# Patient Record
Sex: MALE | Race: WHITE | Employment: OTHER | URBAN - METROPOLITAN AREA
[De-identification: names, ages, dates, MRNs, and addresses within clinical notes are randomized per-mention and may not be internally consistent; named-entity substitution may affect disease eponyms.]

---

## 2018-02-22 ENCOUNTER — HOSPITAL ENCOUNTER (EMERGENCY)
Age: 78
Discharge: HOME | End: 2018-02-22
Attending: EMERGENCY MEDICINE
Payer: MEDICARE

## 2018-02-22 ENCOUNTER — APPOINTMENT (OUTPATIENT)
Dept: GENERAL RADIOLOGY | Age: 78
End: 2018-02-22
Payer: MEDICARE

## 2018-02-22 VITALS
HEIGHT: 70 IN | WEIGHT: 180 LBS | RESPIRATION RATE: 18 BRPM | DIASTOLIC BLOOD PRESSURE: 75 MMHG | OXYGEN SATURATION: 98 % | TEMPERATURE: 97.6 F | BODY MASS INDEX: 25.77 KG/M2 | HEART RATE: 74 BPM | SYSTOLIC BLOOD PRESSURE: 182 MMHG

## 2018-02-22 DIAGNOSIS — S90.31XA CONTUSION OF HEEL, RIGHT, INITIAL ENCOUNTER: ICD-10-CM

## 2018-02-22 DIAGNOSIS — M72.2 PLANTAR FASCIITIS OF RIGHT FOOT: Primary | ICD-10-CM

## 2018-02-22 PROCEDURE — 73650 X-RAY EXAM OF HEEL: CPT

## 2018-02-22 PROCEDURE — 99283 EMERGENCY DEPT VISIT LOW MDM: CPT

## 2018-02-22 RX ORDER — IBUPROFEN 800 MG/1
800 TABLET ORAL EVERY 6 HOURS PRN
COMMUNITY

## 2018-02-22 ASSESSMENT — PAIN DESCRIPTION - ORIENTATION: ORIENTATION: LEFT

## 2018-02-22 ASSESSMENT — PAIN SCALES - GENERAL: PAINLEVEL_OUTOF10: 10

## 2018-02-22 ASSESSMENT — PAIN DESCRIPTION - LOCATION: LOCATION: FOOT

## 2018-02-22 ASSESSMENT — PAIN DESCRIPTION - DESCRIPTORS: DESCRIPTORS: THROBBING;ACHING

## 2018-02-22 NOTE — ED NOTES
Pt presents to er with c/o right foot and ankle pain. Pt states he missed two steps and fell on Tuesday. Pt states he has been icing his ankle and foot and keeping off of it. Pt states pain is not getting any better. Pt has swelling noted. Palpable pedal pulse. No deformity noted. Pt a&ox3. Skin warm and dry. Respirations even and non-labored.       Cierra Way RN  02/22/18 [id number]

## 2019-02-08 ENCOUNTER — APPOINTMENT (OUTPATIENT)
Dept: CT IMAGING | Age: 79
End: 2019-02-08
Payer: COMMERCIAL

## 2019-02-08 ENCOUNTER — HOSPITAL ENCOUNTER (EMERGENCY)
Age: 79
Discharge: HOME | End: 2019-02-08
Attending: FAMILY MEDICINE
Payer: COMMERCIAL

## 2019-02-08 VITALS
BODY MASS INDEX: 27.83 KG/M2 | DIASTOLIC BLOOD PRESSURE: 82 MMHG | WEIGHT: 187.9 LBS | OXYGEN SATURATION: 98 % | HEIGHT: 69 IN | TEMPERATURE: 97.6 F | SYSTOLIC BLOOD PRESSURE: 152 MMHG | RESPIRATION RATE: 16 BRPM | HEART RATE: 75 BPM

## 2019-02-08 DIAGNOSIS — S09.90XA CLOSED HEAD INJURY, INITIAL ENCOUNTER: Primary | ICD-10-CM

## 2019-02-08 LAB
ABSOLUTE EOS #: 0.3 K/UL (ref 0–0.4)
ABSOLUTE IMMATURE GRANULOCYTE: ABNORMAL K/UL (ref 0–0.3)
ABSOLUTE LYMPH #: 1.8 K/UL (ref 1–4.8)
ABSOLUTE MONO #: 0.7 K/UL (ref 0.2–0.8)
ALBUMIN SERPL-MCNC: 4 G/DL (ref 3.5–5.2)
ALBUMIN/GLOBULIN RATIO: ABNORMAL (ref 1–2.5)
ALP BLD-CCNC: 88 U/L (ref 40–129)
ALT SERPL-CCNC: 12 U/L (ref 5–41)
ANION GAP SERPL CALCULATED.3IONS-SCNC: 15 MMOL/L (ref 9–17)
AST SERPL-CCNC: 6 U/L
BASOPHILS # BLD: 1 % (ref 0–2)
BASOPHILS ABSOLUTE: 0 K/UL (ref 0–0.2)
BILIRUB SERPL-MCNC: 0.19 MG/DL (ref 0.3–1.2)
BUN BLDV-MCNC: 23 MG/DL (ref 8–23)
BUN/CREAT BLD: 24 (ref 9–20)
CALCIUM SERPL-MCNC: 9.7 MG/DL (ref 8.6–10.4)
CHLORIDE BLD-SCNC: 105 MMOL/L (ref 98–107)
CO2: 22 MMOL/L (ref 20–31)
CREAT SERPL-MCNC: 0.95 MG/DL (ref 0.7–1.2)
DIFFERENTIAL TYPE: ABNORMAL
EOSINOPHILS RELATIVE PERCENT: 5 % (ref 1–4)
GFR AFRICAN AMERICAN: >60 ML/MIN
GFR NON-AFRICAN AMERICAN: >60 ML/MIN
GFR SERPL CREATININE-BSD FRML MDRD: ABNORMAL ML/MIN/{1.73_M2}
GFR SERPL CREATININE-BSD FRML MDRD: ABNORMAL ML/MIN/{1.73_M2}
GLUCOSE BLD-MCNC: 127 MG/DL (ref 70–99)
HCT VFR BLD CALC: 37.2 % (ref 41–53)
HEMOGLOBIN: 12.4 G/DL (ref 13.5–17.5)
IMMATURE GRANULOCYTES: ABNORMAL %
LYMPHOCYTES # BLD: 33 % (ref 24–44)
MCH RBC QN AUTO: 29.9 PG (ref 26–34)
MCHC RBC AUTO-ENTMCNC: 33.5 G/DL (ref 31–37)
MCV RBC AUTO: 89.5 FL (ref 80–100)
MONOCYTES # BLD: 12 % (ref 1–7)
NRBC AUTOMATED: ABNORMAL PER 100 WBC
PDW BLD-RTO: 13.8 % (ref 11.5–14.5)
PLATELET # BLD: 287 K/UL (ref 130–400)
PLATELET ESTIMATE: ABNORMAL
PMV BLD AUTO: 7.1 FL (ref 6–12)
POTASSIUM SERPL-SCNC: 4.6 MMOL/L (ref 3.7–5.3)
RBC # BLD: 4.15 M/UL (ref 4.5–5.9)
RBC # BLD: ABNORMAL 10*6/UL
SEG NEUTROPHILS: 49 % (ref 36–66)
SEGMENTED NEUTROPHILS ABSOLUTE COUNT: 2.8 K/UL (ref 1.8–7.7)
SODIUM BLD-SCNC: 142 MMOL/L (ref 135–144)
TOTAL PROTEIN: 7 G/DL (ref 6.4–8.3)
WBC # BLD: 5.6 K/UL (ref 3.5–11)
WBC # BLD: ABNORMAL 10*3/UL

## 2019-02-08 PROCEDURE — 80053 COMPREHEN METABOLIC PANEL: CPT

## 2019-02-08 PROCEDURE — 70450 CT HEAD/BRAIN W/O DYE: CPT

## 2019-02-08 PROCEDURE — 85025 COMPLETE CBC W/AUTO DIFF WBC: CPT

## 2019-02-08 PROCEDURE — 81003 URINALYSIS AUTO W/O SCOPE: CPT

## 2019-02-08 PROCEDURE — 99284 EMERGENCY DEPT VISIT MOD MDM: CPT

## 2019-02-08 PROCEDURE — 72125 CT NECK SPINE W/O DYE: CPT

## 2019-02-08 ASSESSMENT — ENCOUNTER SYMPTOMS
EYES NEGATIVE: 1
GASTROINTESTINAL NEGATIVE: 1
ALLERGIC/IMMUNOLOGIC NEGATIVE: 1
RESPIRATORY NEGATIVE: 1

## 2019-02-08 ASSESSMENT — PAIN DESCRIPTION - DESCRIPTORS: DESCRIPTORS: HEAVINESS

## 2019-02-08 ASSESSMENT — PAIN DESCRIPTION - FREQUENCY: FREQUENCY: CONTINUOUS

## 2019-02-08 ASSESSMENT — PAIN SCALES - WONG BAKER: WONGBAKER_NUMERICALRESPONSE: 2

## 2019-02-08 ASSESSMENT — PAIN DESCRIPTION - LOCATION: LOCATION: HEAD

## 2019-02-08 ASSESSMENT — PAIN SCALES - GENERAL: PAINLEVEL_OUTOF10: 2

## 2020-05-11 ENCOUNTER — APPOINTMENT (OUTPATIENT)
Dept: CT IMAGING | Age: 80
End: 2020-05-11
Payer: COMMERCIAL

## 2020-05-11 ENCOUNTER — HOSPITAL ENCOUNTER (EMERGENCY)
Age: 80
Discharge: HOME | End: 2020-05-11
Attending: EMERGENCY MEDICINE
Payer: COMMERCIAL

## 2020-05-11 VITALS
DIASTOLIC BLOOD PRESSURE: 88 MMHG | BODY MASS INDEX: 26.66 KG/M2 | TEMPERATURE: 97.7 F | HEIGHT: 69 IN | OXYGEN SATURATION: 98 % | SYSTOLIC BLOOD PRESSURE: 178 MMHG | HEART RATE: 78 BPM | WEIGHT: 180 LBS | RESPIRATION RATE: 16 BRPM

## 2020-05-11 LAB
ABSOLUTE EOS #: 0.32 K/UL (ref 0–0.44)
ABSOLUTE IMMATURE GRANULOCYTE: 0.08 K/UL (ref 0–0.3)
ABSOLUTE LYMPH #: 1.3 K/UL (ref 1.1–3.7)
ABSOLUTE MONO #: 0.54 K/UL (ref 0.1–1.2)
ANION GAP SERPL CALCULATED.3IONS-SCNC: 15 MMOL/L (ref 9–17)
BASOPHILS # BLD: 1 % (ref 0–2)
BASOPHILS ABSOLUTE: 0.03 K/UL (ref 0–0.2)
BUN BLDV-MCNC: 25 MG/DL (ref 8–23)
BUN/CREAT BLD: 20 (ref 9–20)
CALCIUM SERPL-MCNC: 9.3 MG/DL (ref 8.6–10.4)
CHLORIDE BLD-SCNC: 106 MMOL/L (ref 98–107)
CO2: 18 MMOL/L (ref 20–31)
CREAT SERPL-MCNC: 1.22 MG/DL (ref 0.7–1.2)
DIFFERENTIAL TYPE: ABNORMAL
EOSINOPHILS RELATIVE PERCENT: 7 % (ref 1–4)
GFR AFRICAN AMERICAN: >60 ML/MIN
GFR NON-AFRICAN AMERICAN: 57 ML/MIN
GFR SERPL CREATININE-BSD FRML MDRD: ABNORMAL ML/MIN/{1.73_M2}
GFR SERPL CREATININE-BSD FRML MDRD: ABNORMAL ML/MIN/{1.73_M2}
GLUCOSE BLD-MCNC: 128 MG/DL (ref 70–99)
HCT VFR BLD CALC: 36.1 % (ref 40.7–50.3)
HEMOGLOBIN: 11.6 G/DL (ref 13–17)
IMMATURE GRANULOCYTES: 2 %
LYMPHOCYTES # BLD: 27 % (ref 24–43)
MCH RBC QN AUTO: 30.6 PG (ref 25.2–33.5)
MCHC RBC AUTO-ENTMCNC: 32.1 G/DL (ref 28.4–34.8)
MCV RBC AUTO: 95.3 FL (ref 82.6–102.9)
MONOCYTES # BLD: 11 % (ref 3–12)
NRBC AUTOMATED: 0 PER 100 WBC
PDW BLD-RTO: 13 % (ref 11.8–14.4)
PLATELET # BLD: 249 K/UL (ref 138–453)
PLATELET ESTIMATE: ABNORMAL
PMV BLD AUTO: 8.5 FL (ref 8.1–13.5)
POTASSIUM SERPL-SCNC: 4.6 MMOL/L (ref 3.7–5.3)
RBC # BLD: 3.79 M/UL (ref 4.21–5.77)
RBC # BLD: ABNORMAL 10*6/UL
SEG NEUTROPHILS: 52 % (ref 36–65)
SEGMENTED NEUTROPHILS ABSOLUTE COUNT: 2.59 K/UL (ref 1.5–8.1)
SODIUM BLD-SCNC: 139 MMOL/L (ref 135–144)
WBC # BLD: 4.9 K/UL (ref 3.5–11.3)
WBC # BLD: ABNORMAL 10*3/UL

## 2020-05-11 PROCEDURE — 99284 EMERGENCY DEPT VISIT MOD MDM: CPT

## 2020-05-11 PROCEDURE — 96372 THER/PROPH/DIAG INJ SC/IM: CPT

## 2020-05-11 PROCEDURE — 6360000002 HC RX W HCPCS: Performed by: NURSE PRACTITIONER

## 2020-05-11 PROCEDURE — 36415 COLL VENOUS BLD VENIPUNCTURE: CPT

## 2020-05-11 PROCEDURE — 80048 BASIC METABOLIC PNL TOTAL CA: CPT

## 2020-05-11 PROCEDURE — 85025 COMPLETE CBC W/AUTO DIFF WBC: CPT

## 2020-05-11 PROCEDURE — 74176 CT ABD & PELVIS W/O CONTRAST: CPT

## 2020-05-11 RX ORDER — CYCLOBENZAPRINE HCL 10 MG
10 TABLET ORAL 3 TIMES DAILY PRN
Qty: 21 TABLET | Refills: 0 | Status: SHIPPED | OUTPATIENT
Start: 2020-05-11 | End: 2020-05-21

## 2020-05-11 RX ORDER — HYDROCODONE BITARTRATE AND ACETAMINOPHEN 5; 325 MG/1; MG/1
1 TABLET ORAL EVERY 6 HOURS PRN
Qty: 12 TABLET | Refills: 0 | Status: SHIPPED | OUTPATIENT
Start: 2020-05-11 | End: 2020-05-14

## 2020-05-11 RX ORDER — HYDROMORPHONE HYDROCHLORIDE 1 MG/ML
1 INJECTION, SOLUTION INTRAMUSCULAR; INTRAVENOUS; SUBCUTANEOUS ONCE
Status: COMPLETED | OUTPATIENT
Start: 2020-05-11 | End: 2020-05-11

## 2020-05-11 RX ORDER — KETOROLAC TROMETHAMINE 30 MG/ML
30 INJECTION, SOLUTION INTRAMUSCULAR; INTRAVENOUS ONCE
Status: COMPLETED | OUTPATIENT
Start: 2020-05-11 | End: 2020-05-11

## 2020-05-11 RX ADMIN — KETOROLAC TROMETHAMINE 30 MG: 30 INJECTION, SOLUTION INTRAMUSCULAR; INTRAVENOUS at 10:58

## 2020-05-11 RX ADMIN — HYDROMORPHONE HYDROCHLORIDE 1 MG: 1 INJECTION, SOLUTION INTRAMUSCULAR; INTRAVENOUS; SUBCUTANEOUS at 11:57

## 2020-05-11 ASSESSMENT — PAIN SCALES - GENERAL
PAINLEVEL_OUTOF10: 10

## 2020-05-11 NOTE — ED NOTES
Pt presents to er with c/o elevated blood pressure with headache and back pain. Pt states he had a headache this morning and states his blood pressure was high. Pt states he took his medication for bp at 1000. Pt states he has had back pain that radiates down bilateral legs. Pt denies known injury. Pt a&ox3. Skin warm and dry. Respirations even and non-labored.       Molly Theodore RN  05/11/20 4547

## 2020-05-12 ENCOUNTER — CARE COORDINATION (OUTPATIENT)
Dept: CARE COORDINATION | Age: 80
End: 2020-05-12

## 2020-05-12 ASSESSMENT — ENCOUNTER SYMPTOMS
RHINORRHEA: 0
ABDOMINAL PAIN: 0
SHORTNESS OF BREATH: 0
SORE THROAT: 0
CONSTIPATION: 0
WHEEZING: 0
SINUS PRESSURE: 0
DIARRHEA: 0
VOMITING: 0
COUGH: 0
NAUSEA: 0
COLOR CHANGE: 0

## 2020-05-12 NOTE — ED PROVIDER NOTES
MOUTH DAILY, Disp-90 tablet, R-1      lisinopril (PRINIVIL;ZESTRIL) 10 MG tablet TAKE 1 TABLET BY MOUTH DAILY, Disp-90 tablet, R-1      pravastatin (PRAVACHOL) 80 MG tablet TAKE 1 TABLET BY MOUTH EVERY DAY, Disp-90 tablet, R-1      furosemide (LASIX) 20 MG tablet Take 1 tablet by mouth daily. , Disp-60 tablet, R-3      butalbital-acetaminophen-caffeine (FIORICET, ESGIC) -40 MG per tablet Take 1 tablet by mouth every 4 hours as needed for Pain., Disp-60 tablet, R-0      !! ibuprofen (ADVIL;MOTRIN) 400 MG tablet Take 1 tablet by mouth every 8 hours as needed. , Disp-120 tablet, R-3      docusate sodium (COLACE) 100 MG capsule Take 1 capsule by mouth daily as needed for Constipation. meclizine (ANTIVERT) 25 MG tablet Take 25 mg by mouth 4 times daily. neomycin-polymyxin-dexameth 3.5-65910-6.1 OINT Starting 8/26/2014, Until Discontinued, R-0, Historical Med      pantoprazole (PROTONIX) 40 MG tablet Take 1 tablet by mouth daily. , Disp-90 tablet, R-1      ondansetron (ZOFRAN ODT) 4 MG disintegrating tablet Take 1 tablet by mouth every 8 hours as needed for Nausea or Other (and as needed for dizziness). , Disp-20 tablet, R-0      aspirin 325 MG tablet Take 325 mg by mouth daily. !! - Potential duplicate medications found. Please discuss with provider. PAST MEDICAL HISTORY         Diagnosis Date    Anxiety     with the chemotherapy     CAD (coronary artery disease)     Dr Parker Shi, has three stents.    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    CORONARY ANGIOPLASTY WITH STENT PLACEMENT      two occasions, one stent first, and then two the Conjunctivae normal.      Pupils: Pupils are equal, round, and reactive to light. Neck:      Musculoskeletal: Normal range of motion and neck supple. Cardiovascular:      Rate and Rhythm: Normal rate and regular rhythm. Pulmonary:      Effort: Pulmonary effort is normal. No respiratory distress. Breath sounds: Normal breath sounds. No stridor. Abdominal:      General: Bowel sounds are normal.      Palpations: Abdomen is soft. Musculoskeletal: Normal range of motion. Lumbar back: He exhibits tenderness and bony tenderness. Lymphadenopathy:      Cervical: No cervical adenopathy. Skin:     General: Skin is warm and dry. Findings: No rash. Neurological:      Mental Status: He is alert and oriented to person, place, and time. RADIOLOGY:   Non-plain film images such as CT, Ultrasound and MRI are read by the radiologist. Magali Goldmann radiographic images are visualized and preliminarily interpreted by the emergency physician with the below findings:    Ct Abdomen Pelvis Wo Contrast    Result Date: 5/11/2020  EXAMINATION: CT OF THE ABDOMEN AND PELVIS WITHOUT CONTRAST 5/11/2020 10:53 am TECHNIQUE: CT of the abdomen and pelvis was performed without the administration of intravenous contrast. Multiplanar reformatted images are provided for review. Dose modulation, iterative reconstruction, and/or weight based adjustment of the mA/kV was utilized to reduce the radiation dose to as low as reasonably achievable. COMPARISON: None. HISTORY: ORDERING SYSTEM PROVIDED HISTORY: back pain, rule out AAA TECHNOLOGIST PROVIDED HISTORY: back pain, rule out AAA Reason for Exam: Lower back pain x 3 months, hypertension, headache today Acuity: Unknown Type of Exam: Unknown FINDINGS: Lower Chest: Mild ground-glass opacities in the lower lobes. No focal area of consolidation or effusion. Organs: Evaluation of the abdominal and pelvic viscera is limited in the absence of contrast.  Mild hepatic steatosis.

## 2020-05-13 ENCOUNTER — CARE COORDINATION (OUTPATIENT)
Dept: CARE COORDINATION | Age: 80
End: 2020-05-13

## 2020-05-13 NOTE — CARE COORDINATION
Attempted 2nd call to patient about COVID 19 education. Message left on Voicemail. Have been unable to contact after 2 attempts.  Will close COVID transition episode

## 2020-10-15 ENCOUNTER — APPOINTMENT (OUTPATIENT)
Dept: GENERAL RADIOLOGY | Age: 80
End: 2020-10-15
Payer: COMMERCIAL

## 2020-10-15 ENCOUNTER — HOSPITAL ENCOUNTER (EMERGENCY)
Age: 80
Discharge: HOME | End: 2020-10-15
Attending: EMERGENCY MEDICINE
Payer: COMMERCIAL

## 2020-10-15 VITALS
WEIGHT: 180 LBS | TEMPERATURE: 97.7 F | DIASTOLIC BLOOD PRESSURE: 80 MMHG | HEIGHT: 69 IN | BODY MASS INDEX: 26.66 KG/M2 | OXYGEN SATURATION: 97 % | SYSTOLIC BLOOD PRESSURE: 173 MMHG | HEART RATE: 58 BPM | RESPIRATION RATE: 18 BRPM

## 2020-10-15 LAB
ABSOLUTE EOS #: 0.41 K/UL (ref 0–0.44)
ABSOLUTE IMMATURE GRANULOCYTE: 0.02 K/UL (ref 0–0.3)
ABSOLUTE LYMPH #: 1.48 K/UL (ref 1.1–3.7)
ABSOLUTE MONO #: 0.7 K/UL (ref 0.1–1.2)
ANION GAP SERPL CALCULATED.3IONS-SCNC: 12 MMOL/L (ref 9–17)
BASOPHILS # BLD: 1 % (ref 0–2)
BASOPHILS ABSOLUTE: 0.04 K/UL (ref 0–0.2)
BUN BLDV-MCNC: 24 MG/DL (ref 8–23)
BUN/CREAT BLD: 26 (ref 9–20)
C-REACTIVE PROTEIN: 8.1 MG/L (ref 0–5)
CALCIUM SERPL-MCNC: 9.5 MG/DL (ref 8.6–10.4)
CHLORIDE BLD-SCNC: 97 MMOL/L (ref 98–107)
CO2: 25 MMOL/L (ref 20–31)
CREAT SERPL-MCNC: 0.93 MG/DL (ref 0.7–1.2)
DIFFERENTIAL TYPE: ABNORMAL
EOSINOPHILS RELATIVE PERCENT: 7 % (ref 1–4)
FERRITIN: 57 UG/L (ref 30–400)
GFR AFRICAN AMERICAN: >60 ML/MIN
GFR NON-AFRICAN AMERICAN: >60 ML/MIN
GFR SERPL CREATININE-BSD FRML MDRD: ABNORMAL ML/MIN/{1.73_M2}
GFR SERPL CREATININE-BSD FRML MDRD: ABNORMAL ML/MIN/{1.73_M2}
GLUCOSE BLD-MCNC: 117 MG/DL (ref 70–99)
HCT VFR BLD CALC: 38.6 % (ref 40.7–50.3)
HEMOGLOBIN: 12.8 G/DL (ref 13–17)
IMMATURE GRANULOCYTES: 0 %
LACTATE DEHYDROGENASE: 281 U/L (ref 135–225)
LYMPHOCYTES # BLD: 25 % (ref 24–43)
MCH RBC QN AUTO: 30 PG (ref 25.2–33.5)
MCHC RBC AUTO-ENTMCNC: 33.2 G/DL (ref 28.4–34.8)
MCV RBC AUTO: 90.4 FL (ref 82.6–102.9)
MONOCYTES # BLD: 12 % (ref 3–12)
MYOGLOBIN: 40 NG/ML (ref 28–72)
NRBC AUTOMATED: 0 PER 100 WBC
PDW BLD-RTO: 13.3 % (ref 11.8–14.4)
PLATELET # BLD: 335 K/UL (ref 138–453)
PLATELET ESTIMATE: ABNORMAL
PMV BLD AUTO: 10 FL (ref 8.1–13.5)
POTASSIUM SERPL-SCNC: 4.4 MMOL/L (ref 3.7–5.3)
PROCALCITONIN: 0.09 NG/ML
RBC # BLD: 4.27 M/UL (ref 4.21–5.77)
RBC # BLD: ABNORMAL 10*6/UL
SEG NEUTROPHILS: 55 % (ref 36–65)
SEGMENTED NEUTROPHILS ABSOLUTE COUNT: 3.37 K/UL (ref 1.5–8.1)
SODIUM BLD-SCNC: 134 MMOL/L (ref 135–144)
TROPONIN INTERP: NORMAL
TROPONIN T: NORMAL NG/ML
TROPONIN, HIGH SENSITIVITY: 17 NG/L (ref 0–22)
WBC # BLD: 6 K/UL (ref 3.5–11.3)
WBC # BLD: ABNORMAL 10*3/UL

## 2020-10-15 PROCEDURE — 6370000000 HC RX 637 (ALT 250 FOR IP): Performed by: EMERGENCY MEDICINE

## 2020-10-15 PROCEDURE — 86140 C-REACTIVE PROTEIN: CPT

## 2020-10-15 PROCEDURE — 99284 EMERGENCY DEPT VISIT MOD MDM: CPT

## 2020-10-15 PROCEDURE — 93005 ELECTROCARDIOGRAM TRACING: CPT

## 2020-10-15 PROCEDURE — 99283 EMERGENCY DEPT VISIT LOW MDM: CPT

## 2020-10-15 PROCEDURE — 83615 LACTATE (LD) (LDH) ENZYME: CPT

## 2020-10-15 PROCEDURE — 96374 THER/PROPH/DIAG INJ IV PUSH: CPT

## 2020-10-15 PROCEDURE — 83874 ASSAY OF MYOGLOBIN: CPT

## 2020-10-15 PROCEDURE — 71045 X-RAY EXAM CHEST 1 VIEW: CPT

## 2020-10-15 PROCEDURE — 82728 ASSAY OF FERRITIN: CPT

## 2020-10-15 PROCEDURE — 85025 COMPLETE CBC W/AUTO DIFF WBC: CPT

## 2020-10-15 PROCEDURE — 6360000002 HC RX W HCPCS: Performed by: EMERGENCY MEDICINE

## 2020-10-15 PROCEDURE — 80048 BASIC METABOLIC PNL TOTAL CA: CPT

## 2020-10-15 PROCEDURE — 84145 PROCALCITONIN (PCT): CPT

## 2020-10-15 PROCEDURE — 84484 ASSAY OF TROPONIN QUANT: CPT

## 2020-10-15 RX ORDER — MECLIZINE HYDROCHLORIDE 25 MG/1
25 TABLET ORAL 3 TIMES DAILY PRN
Qty: 30 TABLET | Refills: 0 | Status: SHIPPED | OUTPATIENT
Start: 2020-10-15 | End: 2020-10-25

## 2020-10-15 RX ORDER — MECLIZINE HCL 12.5 MG/1
25 TABLET ORAL ONCE
Status: COMPLETED | OUTPATIENT
Start: 2020-10-15 | End: 2020-10-15

## 2020-10-15 RX ORDER — ONDANSETRON 4 MG/1
4 TABLET, ORALLY DISINTEGRATING ORAL EVERY 8 HOURS PRN
Qty: 20 TABLET | Refills: 0 | Status: SHIPPED | OUTPATIENT
Start: 2020-10-15

## 2020-10-15 RX ORDER — MORPHINE SULFATE 2 MG/ML
2 INJECTION, SOLUTION INTRAMUSCULAR; INTRAVENOUS ONCE
Status: DISCONTINUED | OUTPATIENT
Start: 2020-10-15 | End: 2020-10-15 | Stop reason: HOSPADM

## 2020-10-15 RX ORDER — ONDANSETRON 2 MG/ML
4 INJECTION INTRAMUSCULAR; INTRAVENOUS ONCE
Status: COMPLETED | OUTPATIENT
Start: 2020-10-15 | End: 2020-10-15

## 2020-10-15 RX ADMIN — ONDANSETRON 4 MG: 2 INJECTION INTRAMUSCULAR; INTRAVENOUS at 20:00

## 2020-10-15 RX ADMIN — MECLIZINE 25 MG: 12.5 TABLET ORAL at 19:59

## 2020-10-15 ASSESSMENT — ENCOUNTER SYMPTOMS
NAUSEA: 0
DIARRHEA: 0
FACIAL SWELLING: 0
COUGH: 0
TROUBLE SWALLOWING: 0
COLOR CHANGE: 0
ABDOMINAL PAIN: 0
SHORTNESS OF BREATH: 0
VOMITING: 0
CONSTIPATION: 0

## 2020-10-15 ASSESSMENT — PAIN SCALES - GENERAL: PAINLEVEL_OUTOF10: 0

## 2020-10-15 NOTE — ED PROVIDER NOTES
[facility] ED  EMERGENCY DEPARTMENT ENCOUNTER      Pt Name: Tiffanie Floyd  MRN: [id number]  [place] 1940  Date of evaluation: 10/15/20  CHIEF COMPLAINT     No chief complaint on file. HISTORY OF PRESENT ILLNESS   Presents to the emergency room via private auto with chest pain that started 3 hours ago. He actually denies pain but has diffuse anterior chest pressure. The pressure has been intermittent for the past 3 hours and last for a few minutes before resolving on its own. He has generalized weakness and dizziness which is accompanies the chest pressure. No diaphoresis or shortness of breath. He does have a history of CAD and had stents placed over 10 years ago. He did have 5 teeth pulled yesterday. He was started on 2 new medications today including antibiotics and Norco.  Family member at the bedside states that he is concern for COVID. He has not had any known ill contacts. No cold symptoms, coughs or fevers. The history is provided by the patient and the spouse. REVIEW OF SYSTEMS     Review of Systems   Constitutional: Positive for fatigue. Negative for activity change and fever. HENT: Positive for dental problem. Negative for congestion, facial swelling and trouble swallowing. Respiratory: Negative for cough and shortness of breath. Cardiovascular: Positive for chest pain. Negative for palpitations. Gastrointestinal: Negative for abdominal pain, constipation, diarrhea, nausea and vomiting. Musculoskeletal: Negative for arthralgias and myalgias. Skin: Negative for color change and rash. Neurological: Positive for dizziness. Negative for headaches. Psychiatric/Behavioral: Negative for confusion and decreased concentration. PASTMEDICAL HISTORY     Past Medical History:   Diagnosis Date    Anxiety     with the chemotherapy     CAD (coronary artery disease)     Dr Marshall Casey, has three stents.    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    Dr Contreras [place], non hodgkin's    Type II or unspecified type diabetes mellitus without mention of complication, not stated as uncontrolled age 79     SURGICAL HISTORY       Past Surgical History:   Procedure Laterality Date    APPENDECTOMY  age 25    COLONOSCOPY      [facility]    CORONARY ANGIOPLASTY WITH STENT PLACEMENT      two occasions, one stent first, and then two the next time, Dr Erroll Snellen       Discharge Medication List as of 10/15/2020  8:24 PM      CONTINUE these medications which have NOT CHANGED    Details   glipiZIDE (GLIPIZIDE XL) 5 MG CR tablet TAKE 1 TABLET BY MOUTH DAILY, Disp-90 tablet, R-1      metFORMIN (GLUCOPHAGE) 500 MG tablet Take 1 tablet by mouth 2 times daily (with meals). , Disp-180 tablet, R-1      metoprolol (TOPROL-XL) 100 MG XL tablet TAKE 1 TABLET BY MOUTH DAILY, Disp-90 tablet, R-1      lisinopril (PRINIVIL;ZESTRIL) 10 MG tablet TAKE 1 TABLET BY MOUTH DAILY, Disp-90 tablet, R-1      pravastatin (PRAVACHOL) 80 MG tablet TAKE 1 TABLET BY MOUTH EVERY DAY, Disp-90 tablet, R-1      aspirin 325 MG tablet Take 325 mg by mouth daily. !! ibuprofen (ADVIL;MOTRIN) 800 MG tablet Take 800 mg by mouth every 6 hours as needed for PainHistorical Med      allopurinol (ZYLOPRIM) 300 MG tablet TAKE 1 TABLET BY MOUTH DAILY, Disp-90 tablet, R-1      furosemide (LASIX) 20 MG tablet Take 1 tablet by mouth daily. , Disp-60 tablet, R-3      butalbital-acetaminophen-caffeine (FIORICET, ESGIC) -40 MG per tablet Take 1 tablet by mouth every 4 hours as needed for Pain., Disp-60 tablet, R-0      !! ibuprofen (ADVIL;MOTRIN) 400 MG tablet Take 1 tablet by mouth every 8 hours as needed. , Disp-120 tablet, R-3      docusate sodium (COLACE) 100 MG capsule Take 1 capsule by mouth daily as needed for Constipation. !! meclizine (ANTIVERT) 25 MG tablet Take 25 mg by mouth 4 times daily. of motion. Right lower leg: No edema. Left lower leg: No edema. Skin:     General: Skin is warm and dry. Neurological:      General: No focal deficit present. Mental Status: He is alert and oriented to person, place, and time. Psychiatric:         Mood and Affect: Mood normal.         Thought Content: Thought content normal.         DIAGNOSTIC RESULTS     RADIOLOGY:All plain film, CT, MRI, and formal ultrasound images (except ED bedside ultrasound) are read by the radiologist, see reports below, unless otherwise noted in MDM or here. Interpretation per the Radiologist below, if available at the time of this note:    XR CHEST PORTABLE   Final Result   No evidence of acute cardiopulmonary disease             LABS:  Labs Reviewed   CBC WITH AUTO DIFFERENTIAL - Abnormal; Notable for the following components:       Result Value    Hemoglobin 12.8 (*)     Hematocrit 38.6 (*)     Eosinophils % 7 (*)     All other components within normal limits   BASIC METABOLIC PANEL - Abnormal; Notable for the following components:    Glucose 117 (*)     BUN 24 (*)     Bun/Cre Ratio 26 (*)     Sodium 134 (*)     Chloride 97 (*)     All other components within normal limits   LACTATE DEHYDROGENASE - Abnormal; Notable for the following components:     (*)     All other components within normal limits   TROP/MYOGLOBIN   FERRITIN   TROP/MYOGLOBIN   C-REACTIVE PROTEIN   PROCALCITONIN       All other labs were within normal range or not returned as of this dictation. EMERGENCY DEPARTMENT COURSE and DIFFERENTIAL DIAGNOSIS/MDM:   Vitals:    Vitals:    10/15/20 1838   BP: (!) 173/80   Pulse: 58   Resp: 18   Temp: 97.7 °F (36.5 °C)   TempSrc: Oral   SpO2: 97%   Weight: 180 lb (81.6 kg)   Height: 5' 9\" (1.753 m)       Medical Decision Making: 3year-old male with chest pressure and dizziness. He is in no distress. Available blood work is essentially negative. Procalcitonin and CRP are pending.   Chest x-ray does not have any evidence of pneumonia. He does have some changes on his EKG which was evaluated by Dr. Zach Kinney. Plan of care was for admission which the patient declined. He did sign out AMA. The patient was given the following meds in the ED:  Orders Placed This Encounter   Medications    morphine (PF) injection 2 mg    meclizine (ANTIVERT) tablet 25 mg    ondansetron (ZOFRAN) injection 4 mg    meclizine (ANTIVERT) 25 MG tablet     Sig: Take 1 tablet by mouth 3 times daily as needed for Dizziness     Dispense:  30 tablet     Refill:  0    ondansetron (ZOFRAN ODT) 4 MG disintegrating tablet     Sig: Take 1 tablet by mouth every 8 hours as needed for Nausea     Dispense:  20 tablet     Refill:  0       FINAL IMPRESSION      1. Dizziness    2. Chest pain, unspecified type          DISPOSITION/PLAN   DISPOSITION [place] 10/15/2020 08:23:36 PM      PATIENT REFERRED TO:   Kait Wolff MD  [address] [address] [facility]  [phone]    Schedule an appointment as soon as possible for a visit in 1 day        DISCHARGE MEDICATIONS:     Discharge Medication List as of 10/15/2020  8:24 PM      START taking these medications    Details   !! meclizine (ANTIVERT) 25 MG tablet Take 1 tablet by mouth 3 times daily as needed for Dizziness, Disp-30 tablet,R-0Print      !! ondansetron (ZOFRAN ODT) 4 MG disintegrating tablet Take 1 tablet by mouth every 8 hours as needed for Nausea, Disp-20 tablet,R-0Print       !! - Potential duplicate medications found. Please discuss with provider.           CRITICAL CARE TIME       Please note that portions of this note were completed with a voice recognition program.      Lesli VICK [phone], APRN - CNP  10/15/20 [id number]

## 2020-10-15 NOTE — ED PROVIDER NOTES
[facility] ED  EMERGENCY DEPARTMENT ENCOUNTER   ATTENDING ATTESTATION     Pt Name: Clem Mosqueda  MRN: [id number]  [place] 1940  Date of evaluation: 10/15/20       Clem Mosqueda is a [de-identified] y.o. male who presents with No chief complaint on file. MDM:     Patient's EKG shows a ventricular rate of 60, NH QRS QTC and was unremarkable, the patient has left axis deviation, he appears to have a new bifascicular block. Nonspecific EKG. No significant changes when compared to the previous EKG from 2014. Vitals:   Vitals:    10/15/20 1838   BP: (!) 173/80   Pulse: 58   Resp: 18   Temp: 97.7 °F (36.5 °C)   TempSrc: Oral   SpO2: 97%   Weight: 180 lb (81.6 kg)   Height: 5' 9\" (1.753 m)         I personally evaluated and examined the patient in conjunction with the Midlevel provider and agree with the assessment, treatment plan, and disposition of the patient as recorded by the midlevel. I performed a history and physical examination of the patient and discussed management with the midlevel. I reviewed the midlevels note and agree with the documented findings and plan of care. Any areas of disagreement are noted on the chart. I was personally present for the key portions of any procedures. I have documented in the chart those procedures where I was not present during the key portions. I have personally reviewed all images and agree with the midlevel's interpretation. I have reviewed the emergency nurses triage note. I agree with the chief complaint, past medical history, past surgical history, allergies, medications, social and family history as documented unless otherwise noted.     Georges Rubinstein, MD  Attending Emergency  Physician                 Yonas Watts MD  10/15/20 6849

## 2020-11-03 PROBLEM — I10 HYPERTENSION: Status: RESOLVED | Noted: 2020-11-03 | Resolved: 2020-11-03

## 2021-07-31 ENCOUNTER — APPOINTMENT (OUTPATIENT)
Dept: GENERAL RADIOLOGY | Age: 81
End: 2021-07-31
Payer: COMMERCIAL

## 2021-07-31 ENCOUNTER — HOSPITAL ENCOUNTER (EMERGENCY)
Age: 81
Discharge: HOME | End: 2021-08-01
Attending: EMERGENCY MEDICINE
Payer: COMMERCIAL

## 2021-07-31 VITALS
DIASTOLIC BLOOD PRESSURE: 74 MMHG | HEART RATE: 82 BPM | TEMPERATURE: 98 F | SYSTOLIC BLOOD PRESSURE: 171 MMHG | WEIGHT: 180 LBS | RESPIRATION RATE: 16 BRPM | HEIGHT: 70 IN | OXYGEN SATURATION: 95 % | BODY MASS INDEX: 25.77 KG/M2

## 2021-07-31 DIAGNOSIS — L03.032 CELLULITIS OF TOE OF LEFT FOOT: Primary | ICD-10-CM

## 2021-07-31 PROCEDURE — 73630 X-RAY EXAM OF FOOT: CPT

## 2021-07-31 PROCEDURE — 99283 EMERGENCY DEPT VISIT LOW MDM: CPT

## 2021-07-31 RX ORDER — AMOXICILLIN AND CLAVULANATE POTASSIUM 875; 125 MG/1; MG/1
1 TABLET, FILM COATED ORAL ONCE
Status: COMPLETED | OUTPATIENT
Start: 2021-07-31 | End: 2021-08-01

## 2021-07-31 RX ORDER — AMOXICILLIN AND CLAVULANATE POTASSIUM 875; 125 MG/1; MG/1
1 TABLET, FILM COATED ORAL 2 TIMES DAILY
Qty: 20 TABLET | Refills: 0 | Status: SHIPPED | OUTPATIENT
Start: 2021-07-31 | End: 2022-03-23

## 2021-07-31 ASSESSMENT — ENCOUNTER SYMPTOMS
CONSTIPATION: 0
SHORTNESS OF BREATH: 0
COUGH: 0
VOMITING: 0
EYE REDNESS: 0
DIARRHEA: 0
COLOR CHANGE: 0
EYE DISCHARGE: 0
ABDOMINAL PAIN: 0
FACIAL SWELLING: 0

## 2021-07-31 ASSESSMENT — PAIN DESCRIPTION - PAIN TYPE: TYPE: ACUTE PAIN

## 2021-07-31 ASSESSMENT — PAIN DESCRIPTION - ORIENTATION: ORIENTATION: LEFT

## 2021-07-31 ASSESSMENT — PAIN SCALES - GENERAL: PAINLEVEL_OUTOF10: 10

## 2021-07-31 ASSESSMENT — PAIN DESCRIPTION - DESCRIPTORS: DESCRIPTORS: THROBBING

## 2021-07-31 ASSESSMENT — PAIN DESCRIPTION - LOCATION: LOCATION: TOE (COMMENT WHICH ONE)

## 2021-08-01 PROCEDURE — 6370000000 HC RX 637 (ALT 250 FOR IP): Performed by: EMERGENCY MEDICINE

## 2021-08-01 RX ADMIN — AMOXICILLIN AND CLAVULANATE POTASSIUM 1 TABLET: 875; 125 TABLET, FILM COATED ORAL at 00:01

## 2021-08-01 NOTE — ED PROVIDER NOTES
[address] [place] [address] ED  EMERGENCY DEPARTMENT ENCOUNTER      Pt Name: Patricia Contreras  MRN: [id number]  [place] 1940  Date of evaluation: 7/31/2021  Provider: Dada Zuluaga MD    CHIEF COMPLAINT       Chief Complaint   Patient presents with    Toe Pain     left, second toe, redness, warmth, swelling started x5 hours ago, no injury         HISTORY OF PRESENT ILLNESS  (Location/Symptom, Timing/Onset, Context/Setting, Quality, Duration, Modifying Factors, Severity.)   Patricia Contreras is a 80 y.o. male who presents to the emergency department for pain to his left second toe. No trauma and it started hurting about 3 hours ago he noticed it was red. He is worried about an infection. He rated the pain as a 10 and its continuous. Nursing Notes were reviewed. ALLERGIES     Patient has no known allergies. CURRENT MEDICATIONS       Previous Medications    ALLOPURINOL (ZYLOPRIM) 300 MG TABLET    TAKE 1 TABLET BY MOUTH DAILY    ASPIRIN 325 MG TABLET    Take 325 mg by mouth daily. BUTALBITAL-ACETAMINOPHEN-CAFFEINE (FIORICET, ESGIC) -40 MG PER TABLET    Take 1 tablet by mouth every 4 hours as needed for Pain. DOCUSATE SODIUM (COLACE) 100 MG CAPSULE    Take 1 capsule by mouth daily as needed for Constipation. FUROSEMIDE (LASIX) 20 MG TABLET    Take 1 tablet by mouth daily. GLIPIZIDE (GLIPIZIDE XL) 5 MG CR TABLET    TAKE 1 TABLET BY MOUTH DAILY    IBUPROFEN (ADVIL;MOTRIN) 400 MG TABLET    Take 1 tablet by mouth every 8 hours as needed. IBUPROFEN (ADVIL;MOTRIN) 800 MG TABLET    Take 800 mg by mouth every 6 hours as needed for Pain    LISINOPRIL (PRINIVIL;ZESTRIL) 10 MG TABLET    TAKE 1 TABLET BY MOUTH DAILY    MECLIZINE (ANTIVERT) 25 MG TABLET    Take 25 mg by mouth 4 times daily. METFORMIN (GLUCOPHAGE) 500 MG TABLET    Take 1 tablet by mouth 2 times daily (with meals).     METOPROLOL (TOPROL-XL) 100 MG XL TABLET    TAKE 1 TABLET BY MOUTH DAILY    NEOMYCIN-POLYMYXIN-DEXAMETH 3.5-41608-5.1 OINT ONDANSETRON (ZOFRAN ODT) 4 MG DISINTEGRATING TABLET    Take 1 tablet by mouth every 8 hours as needed for Nausea or Other (and as needed for dizziness). ONDANSETRON (ZOFRAN ODT) 4 MG DISINTEGRATING TABLET    Take 1 tablet by mouth every 8 hours as needed for Nausea    PANTOPRAZOLE (PROTONIX) 40 MG TABLET    Take 1 tablet by mouth daily. PRAVASTATIN (PRAVACHOL) 80 MG TABLET    TAKE 1 TABLET BY MOUTH EVERY DAY       PAST MEDICAL HISTORY         Diagnosis Date    Anxiety     with the chemotherapy     CAD (coronary artery disease)     Dr Chanelle Fitzgerald, has three stents.  GERD (gastroesophageal reflux disease)     Gout     Hyperlipidemia age 79    Hypertension age 79    Lymphoma [facility] remission [facility])     Dr Lala Hunter, non hodgkin's    Type II or unspecified type diabetes mellitus without mention of complication, not stated as uncontrolled age 79       SURGICAL HISTORY           Procedure Laterality Date    APPENDECTOMY  age 25   [facility] COLONOSCOPY      [facility]    CORONARY ANGIOPLASTY WITH STENT PLACEMENT      two occasions, one stent first, and then two the next time, Dr Khushbu Paz Age of Onset    High Blood Pressure Sister     Diabetes Sister     High Blood Pressure Brother     Diabetes Brother      Family Status   Relation Name Status    Sister [facility] Brother MOSES Alive    Mother   at age 66        CVA    Father   at age 72        septisemia, post dental extraction    MGM      MGF      PGM      PGF      Vish WYATT Alive    Vish ALDANA Alive    Son [id number] [address]      reports that he has never smoked. He has never used smokeless tobacco. He reports that he does not drink alcohol and does not use drugs.     REVIEW OF SYSTEMS    (2-9 systems for level 4, 10 or more for level 5)     Review of Systems   Constitutional: Negative for chills, fatigue and fever. HENT: Negative for congestion, ear discharge and facial swelling. Eyes: Negative for discharge and redness. Respiratory: Negative for cough and shortness of breath. Cardiovascular: Negative for chest pain. Gastrointestinal: Negative for abdominal pain, constipation, diarrhea and vomiting. Genitourinary: Negative for dysuria and hematuria. Musculoskeletal: Negative for arthralgias. Skin: Negative for color change and rash. Neurological: Negative for syncope, numbness and headaches. Hematological: Negative for adenopathy. Psychiatric/Behavioral: Negative for confusion. The patient is not nervous/anxious. Except as noted above the remainder of the review of systems was reviewed and negative. PHYSICAL EXAM    (up to 7 for level 4, 8 or more for level 5)     Vitals:    07/31/21 2248   BP: (!) 171/74   Pulse: 82   Resp: 16   Temp: 98 °F (36.7 °C)   TempSrc: Oral   SpO2: 95%   Weight: 180 lb (81.6 kg)   Height: 5' 10\" (1.778 m)       Physical Exam  Vitals reviewed. Constitutional:       General: He is not in acute distress. Appearance: He is well-developed. He is not diaphoretic. HENT:      Head: Normocephalic and atraumatic. Eyes:      General: No scleral icterus. Right eye: No discharge. Left eye: No discharge. Cardiovascular:      Rate and Rhythm: Normal rate and regular rhythm. Pulmonary:      Effort: Pulmonary effort is normal. No respiratory distress. Breath sounds: Normal breath sounds. No stridor. No wheezing or rales. Abdominal:      General: There is no distension. Palpations: Abdomen is soft. Tenderness: There is no abdominal tenderness. Musculoskeletal:      Cervical back: Neck supple. Comments: Right second toe is erythematous and minimally swollen. Some mild erythema extends a few centimeters onto the dorsum of the foot. No open areas, no paronychia. Lymphadenopathy:      Cervical: No cervical adenopathy. Skin:     General: Skin is warm and dry. Findings: No erythema or rash. Neurological:      Mental Status: He is alert and oriented to person, place, and time. Psychiatric:         Behavior: Behavior normal.             DIAGNOSTIC RESULTS     EKG: All EKG's are interpreted by the Emergency Department Physician who either signs or Co-signs this chart in the absence of a cardiologist.    Not indicated    RADIOLOGY:   Non-plain film images such as CT, Ultrasound and MRI are read by the radiologist. Plain radiographic images are visualized and preliminarily interpreted by the emergency physician with the below findings:    Interpretation per the Radiologist below, if available at the time of this note:    XR FOOT LEFT (MIN 3 VIEWS)    Result Date: 7/31/2021  EXAMINATION: THREE XRAY VIEWS OF THE LEFT FOOT 7/31/2021 11:11 pm COMPARISON: None. HISTORY: ORDERING SYSTEM PROVIDED HISTORY: Pain TECHNOLOGIST PROVIDED HISTORY: Pain Reason for Exam: left 4th toe   red  painful Mechanism of Injury: none FINDINGS: There is no evidence of acute fracture. There is normal alignment of the tarsometatarsal joints. No acute joint abnormality. No focal osseous lesion. No focal soft tissue abnormality. No acute osseous abnormality. LABS:  Labs Reviewed - No data to display    All other labs were within normal range or not returned as of this dictation.     EMERGENCY DEPARTMENT COURSE and DIFFERENTIAL DIAGNOSIS/MDM:   Vitals:    Vitals:    07/31/21 2248   BP: (!) 171/74   Pulse: 82   Resp: 16   Temp: 98 °F (36.7 °C)   TempSrc: Oral   SpO2: 95%   Weight: 180 lb (81.6 kg)   Height: 5' 10\" (1.778 m)       Orders Placed This Encounter   Medications    amoxicillin-clavulanate (AUGMENTIN) 875-125 MG per tablet 1 tablet     Order Specific Question:   Antimicrobial Indications     Answer:   Skin and Soft Tissue Infection    amoxicillin-clavulanate (AUGMENTIN) 875-125 MG per tablet     Sig: Take 1 tablet by mouth 2 times daily Dispense:  20 tablet     Refill:  0       Medical Decision Making: X-rays negative. My clinical impression is that he has cellulitis. He was given his first dose of Augmentin here and prescribed Augmentin. Treatment diagnosis and follow-up were discussed with the patient. CONSULTS:  None    PROCEDURES:  None    FINAL IMPRESSION      1.  Cellulitis of toe of left foot          DISPOSITION/PLAN   DISPOSITION Decision To Discharge 07/31/2021 11:57:44 PM      PATIENT REFERRED TO:   Nate Voss MD  [address] [address] [facility]  [phone]      As needed    [facility] ED  [address] [facility]  [phone]    If symptoms worsen      DISCHARGE MEDICATIONS:     New Prescriptions    AMOXICILLIN-CLAVULANATE (AUGMENTIN) 875-125 MG PER TABLET    Take 1 tablet by mouth 2 times daily         (Please note that portions of this note were completed with a voice recognition program.  Efforts were made to edit the dictations but occasionally words are mis-transcribed.)    Serenity Branch MD  Attending Emergency Physician           Serenity Branch MD  07/31/21 0996

## 2022-01-20 ENCOUNTER — APPOINTMENT (OUTPATIENT)
Dept: CT IMAGING | Age: 82
End: 2022-01-20
Payer: MEDICARE

## 2022-01-20 ENCOUNTER — APPOINTMENT (OUTPATIENT)
Dept: GENERAL RADIOLOGY | Age: 82
End: 2022-01-20
Payer: MEDICARE

## 2022-01-20 ENCOUNTER — HOSPITAL ENCOUNTER (EMERGENCY)
Age: 82
Discharge: HOME | End: 2022-01-20
Attending: EMERGENCY MEDICINE
Payer: MEDICARE

## 2022-01-20 VITALS
HEART RATE: 87 BPM | SYSTOLIC BLOOD PRESSURE: 179 MMHG | WEIGHT: 190 LBS | DIASTOLIC BLOOD PRESSURE: 93 MMHG | RESPIRATION RATE: 16 BRPM | BODY MASS INDEX: 27.26 KG/M2 | TEMPERATURE: 97.9 F | OXYGEN SATURATION: 95 %

## 2022-01-20 DIAGNOSIS — J02.9 VIRAL PHARYNGITIS: Primary | ICD-10-CM

## 2022-01-20 DIAGNOSIS — Z20.822 SUSPECTED COVID-19 VIRUS INFECTION: ICD-10-CM

## 2022-01-20 LAB
ABSOLUTE EOS #: 0.6 K/UL (ref 0–0.44)
ABSOLUTE IMMATURE GRANULOCYTE: 0.04 K/UL (ref 0–0.3)
ABSOLUTE LYMPH #: 1.68 K/UL (ref 1.1–3.7)
ABSOLUTE MONO #: 0.68 K/UL (ref 0.1–1.2)
ANION GAP SERPL CALCULATED.3IONS-SCNC: 14 MMOL/L (ref 9–17)
BASOPHILS # BLD: 1 % (ref 0–2)
BASOPHILS ABSOLUTE: 0.06 K/UL (ref 0–0.2)
BUN BLDV-MCNC: 21 MG/DL (ref 8–23)
BUN/CREAT BLD: 20 (ref 9–20)
CALCIUM SERPL-MCNC: 9.3 MG/DL (ref 8.6–10.4)
CHLORIDE BLD-SCNC: 104 MMOL/L (ref 98–107)
CO2: 19 MMOL/L (ref 20–31)
CREAT SERPL-MCNC: 1.07 MG/DL (ref 0.7–1.2)
DIFFERENTIAL TYPE: ABNORMAL
DIRECT EXAM: NORMAL
EOSINOPHILS RELATIVE PERCENT: 9 % (ref 1–4)
GFR AFRICAN AMERICAN: >60 ML/MIN
GFR NON-AFRICAN AMERICAN: >60 ML/MIN
GFR SERPL CREATININE-BSD FRML MDRD: ABNORMAL ML/MIN/{1.73_M2}
GFR SERPL CREATININE-BSD FRML MDRD: ABNORMAL ML/MIN/{1.73_M2}
GLUCOSE BLD-MCNC: 240 MG/DL (ref 70–99)
HCT VFR BLD CALC: 37.1 % (ref 40.7–50.3)
HEMOGLOBIN: 12.1 G/DL (ref 13–17)
IMMATURE GRANULOCYTES: 1 %
LYMPHOCYTES # BLD: 26 % (ref 24–43)
Lab: NORMAL
MCH RBC QN AUTO: 31.3 PG (ref 25.2–33.5)
MCHC RBC AUTO-ENTMCNC: 32.6 G/DL (ref 28.4–34.8)
MCV RBC AUTO: 95.9 FL (ref 82.6–102.9)
MONOCYTES # BLD: 11 % (ref 3–12)
NRBC AUTOMATED: 0 PER 100 WBC
PDW BLD-RTO: 14.4 % (ref 11.8–14.4)
PLATELET # BLD: 279 K/UL (ref 138–453)
PLATELET ESTIMATE: ABNORMAL
PMV BLD AUTO: 9.2 FL (ref 8.1–13.5)
POTASSIUM SERPL-SCNC: 4.2 MMOL/L (ref 3.7–5.3)
RBC # BLD: 3.87 M/UL (ref 4.21–5.77)
RBC # BLD: ABNORMAL 10*6/UL
SARS-COV-2: ABNORMAL
SARS-COV-2: DETECTED
SEG NEUTROPHILS: 52 % (ref 36–65)
SEGMENTED NEUTROPHILS ABSOLUTE COUNT: 3.44 K/UL (ref 1.5–8.1)
SODIUM BLD-SCNC: 137 MMOL/L (ref 135–144)
SOURCE: ABNORMAL
SPECIMEN DESCRIPTION: NORMAL
WBC # BLD: 6.5 K/UL (ref 3.5–11.3)
WBC # BLD: ABNORMAL 10*3/UL

## 2022-01-20 PROCEDURE — 87880 STREP A ASSAY W/OPTIC: CPT

## 2022-01-20 PROCEDURE — 70491 CT SOFT TISSUE NECK W/DYE: CPT

## 2022-01-20 PROCEDURE — U0005 INFEC AGEN DETEC AMPLI PROBE: HCPCS

## 2022-01-20 PROCEDURE — 71045 X-RAY EXAM CHEST 1 VIEW: CPT

## 2022-01-20 PROCEDURE — 96374 THER/PROPH/DIAG INJ IV PUSH: CPT

## 2022-01-20 PROCEDURE — 2580000003 HC RX 258: Performed by: EMERGENCY MEDICINE

## 2022-01-20 PROCEDURE — 80048 BASIC METABOLIC PNL TOTAL CA: CPT

## 2022-01-20 PROCEDURE — 99283 EMERGENCY DEPT VISIT LOW MDM: CPT

## 2022-01-20 PROCEDURE — U0003 INFECTIOUS AGENT DETECTION BY NUCLEIC ACID (DNA OR RNA); SEVERE ACUTE RESPIRATORY SYNDROME CORONAVIRUS 2 (SARS-COV-2) (CORONAVIRUS DISEASE [COVID-19]), AMPLIFIED PROBE TECHNIQUE, MAKING USE OF HIGH THROUGHPUT TECHNOLOGIES AS DESCRIBED BY CMS-2020-01-R: HCPCS

## 2022-01-20 PROCEDURE — 85025 COMPLETE CBC W/AUTO DIFF WBC: CPT

## 2022-01-20 PROCEDURE — 6360000002 HC RX W HCPCS: Performed by: EMERGENCY MEDICINE

## 2022-01-20 PROCEDURE — 6360000004 HC RX CONTRAST MEDICATION: Performed by: EMERGENCY MEDICINE

## 2022-01-20 RX ORDER — SODIUM CHLORIDE 0.9 % (FLUSH) 0.9 %
10 SYRINGE (ML) INJECTION PRN
Status: DISCONTINUED | OUTPATIENT
Start: 2022-01-20 | End: 2022-01-20 | Stop reason: HOSPADM

## 2022-01-20 RX ORDER — DEXAMETHASONE SODIUM PHOSPHATE 10 MG/ML
10 INJECTION, SOLUTION INTRAMUSCULAR; INTRAVENOUS ONCE
Status: COMPLETED | OUTPATIENT
Start: 2022-01-20 | End: 2022-01-20

## 2022-01-20 RX ORDER — 0.9 % SODIUM CHLORIDE 0.9 %
80 INTRAVENOUS SOLUTION INTRAVENOUS ONCE
Status: DISCONTINUED | OUTPATIENT
Start: 2022-01-20 | End: 2022-01-20 | Stop reason: HOSPADM

## 2022-01-20 RX ADMIN — SODIUM CHLORIDE, PRESERVATIVE FREE 10 ML: 5 INJECTION INTRAVENOUS at 09:11

## 2022-01-20 RX ADMIN — IOPAMIDOL 75 ML: 755 INJECTION, SOLUTION INTRAVENOUS at 09:11

## 2022-01-20 RX ADMIN — DEXAMETHASONE SODIUM PHOSPHATE 10 MG: 10 INJECTION, SOLUTION INTRAMUSCULAR; INTRAVENOUS at 08:12

## 2022-01-20 RX ADMIN — Medication 80 ML: at 09:11

## 2022-01-20 ASSESSMENT — ENCOUNTER SYMPTOMS
BACK PAIN: 0
COUGH: 1
SORE THROAT: 1
EYE DISCHARGE: 0
ABDOMINAL DISTENTION: 0
FACIAL SWELLING: 0
SHORTNESS OF BREATH: 0
ABDOMINAL PAIN: 0
CHEST TIGHTNESS: 0
EYE PAIN: 0

## 2022-01-20 ASSESSMENT — PAIN DESCRIPTION - LOCATION: LOCATION: THROAT

## 2022-01-20 NOTE — ED PROVIDER NOTES
EMERGENCY DEPARTMENT ENCOUNTER    Pt Name: Samantha Jenkins  MRN: [id number]  [place] 1940  Date of evaluation: 1/20/22  CHIEF COMPLAINT       Chief Complaint   Patient presents with    Cough    Pharyngitis     HISTORY OF PRESENT ILLNESS   HPI   Patient is a 70-year-old male who presented to the emergency department secondary to sore throat. Patient complains of a 5-day history of sore throat. Initially thought it was a dental infection was initiated on antibiotics no extraction however is continued to have difficulty swallowing. He denies changes in voice or drooling and still able to eat. Patient also complains of a productive cough. Patient is fully vaccinated against COVID no COVID-like symptoms however his wife is requesting him to be tested for COVID. Patient denied shortness of breath, chest pain, nausea, vomiting, fevers or chills. REVIEW OF SYSTEMS     Review of Systems   Constitutional: Negative for chills, diaphoresis and fever. HENT: Positive for sore throat. Negative for congestion, ear pain and facial swelling. Eyes: Negative for pain, discharge and visual disturbance. Respiratory: Positive for cough. Negative for chest tightness and shortness of breath. Cardiovascular: Negative for chest pain and palpitations. Gastrointestinal: Negative for abdominal distention and abdominal pain. Genitourinary: Negative for difficulty urinating and flank pain. Musculoskeletal: Negative for back pain. Skin: Negative for wound. Neurological: Negative for dizziness, light-headedness and headaches. PASTMEDICAL HISTORY     Past Medical History:   Diagnosis Date    Anxiety     with the chemotherapy     CAD (coronary artery disease)     Dr Anastasiia Honeycutt, has three stents.     GERD (gastroesophageal reflux disease)     Gout     Hyperlipidemia age 79    Hypertension age 79    [facility]oma [facility]) 2006    Dr Maximino Farrell, non hodgkin's    Type II or unspecified type diabetes mellitus without mention of complication, not stated as uncontrolled age 79     SURGICAL HISTORY       Past Surgical History:   Procedure Laterality Date    APPENDECTOMY  age 25   [facility] Carbon BACK SURGERY      COLONOSCOPY      [facility]    CORONARY ANGIOPLASTY WITH STENT PLACEMENT      two occasions, one stent first, and then two the next time, Dr Ann Garcia       Previous Medications    ALLOPURINOL (ZYLOPRIM) 300 MG TABLET    TAKE 1 TABLET BY MOUTH DAILY    AMOXICILLIN-CLAVULANATE (AUGMENTIN) 875-125 MG PER TABLET    Take 1 tablet by mouth 2 times daily    ASPIRIN 325 MG TABLET    Take 325 mg by mouth daily. BUTALBITAL-ACETAMINOPHEN-CAFFEINE (FIORICET, ESGIC) -40 MG PER TABLET    Take 1 tablet by mouth every 4 hours as needed for Pain. DOCUSATE SODIUM (COLACE) 100 MG CAPSULE    Take 1 capsule by mouth daily as needed for Constipation. FUROSEMIDE (LASIX) 20 MG TABLET    Take 1 tablet by mouth daily. GLIPIZIDE (GLIPIZIDE XL) 5 MG CR TABLET    TAKE 1 TABLET BY MOUTH DAILY    IBUPROFEN (ADVIL;MOTRIN) 400 MG TABLET    Take 1 tablet by mouth every 8 hours as needed. IBUPROFEN (ADVIL;MOTRIN) 800 MG TABLET    Take 800 mg by mouth every 6 hours as needed for Pain    LISINOPRIL (PRINIVIL;ZESTRIL) 10 MG TABLET    TAKE 1 TABLET BY MOUTH DAILY    MECLIZINE (ANTIVERT) 25 MG TABLET    Take 25 mg by mouth 4 times daily. METFORMIN (GLUCOPHAGE) 500 MG TABLET    Take 1 tablet by mouth 2 times daily (with meals). METOPROLOL (TOPROL-XL) 100 MG XL TABLET    TAKE 1 TABLET BY MOUTH DAILY    NEOMYCIN-POLYMYXIN-DEXAMETH 3.5-04082-2.1 OINT        ONDANSETRON (ZOFRAN ODT) 4 MG DISINTEGRATING TABLET    Take 1 tablet by mouth every 8 hours as needed for Nausea or Other (and as needed for dizziness). ONDANSETRON (ZOFRAN ODT) 4 MG DISINTEGRATING TABLET    Take 1 tablet by mouth every 8 hours as needed for Nausea    PANTOPRAZOLE (PROTONIX) 40 MG TABLET    Take 1 tablet by mouth daily.     PRAVASTATIN (PRAVACHOL) 80 MG TABLET    TAKE 1 TABLET BY MOUTH EVERY DAY     ALLERGIES     has No Known Allergies. FAMILY HISTORY     He indicated that his mother is . He indicated that his father is . He indicated that his sister is alive. He indicated that his brother is alive. He indicated that his maternal grandmother is . He indicated that his maternal grandfather is . He indicated that his paternal grandmother is . He indicated that his paternal grandfather is . He indicated that both of his daughters are alive. He indicated that his son is alive. SOCIAL HISTORY       Social History     Tobacco Use    Smoking status: Never Smoker    Smokeless tobacco: Never Used   Substance Use Topics    Alcohol use: No    Drug use: No     PHYSICAL EXAM     INITIAL VITALS: BP (!) 179/93   Pulse 87   Temp 97.9 °F (36.6 °C) (Oral)   Resp 16   Wt 190 lb (86.2 kg)   SpO2 95%   BMI 27.26 kg/m²    Physical Exam  Vitals and nursing note reviewed. Constitutional:       General: He is not in acute distress. Appearance: He is well-developed. He is not diaphoretic. HENT:      Head: Normocephalic and atraumatic. Comments: Oral cavity: Uvula midline nonedematous, bilateral tonsillar erythema no exudate, no sublingual edema. Right anterior cervical lymphadenopathy appreciated     Mouth/Throat: Tonsils: No tonsillar exudate or tonsillar abscesses. 2+ on the right. 2+ on the left. Eyes:      Pupils: Pupils are equal, round, and reactive to light. Cardiovascular:      Rate and Rhythm: Normal rate and regular rhythm. Pulmonary:      Effort: Pulmonary effort is normal.      Breath sounds: Normal breath sounds. Abdominal:      General: Bowel sounds are normal.      Palpations: Abdomen is soft. Musculoskeletal:         General: Normal range of motion. Cervical back: Normal range of motion and neck supple. Skin:     General: Skin is warm.       Capillary Refill: Capillary refill takes less than 2 seconds. Neurological:      Mental Status: He is alert and oriented to person, place, and time. MEDICAL DECISION MAKING:   The patient is a 80-year-old male who presented to the emergency department secondary to sore throat. Orders for rapid strep, routine COVID chest x-ray as well as CT soft tissue neck. Pending imaging patient received Decadron. Patient will be reevaluated  Chest XR infiltrate dissection pneumothorax. Rapid strep negative. CT soft tissue neck no acute findings. Routine COVID pending at this time results discussed with patient. Patient on 95% on room air no acute distress protecting his airway able to tolerate oral he is stable for outpatient follow-up. Discharged home with instruction follow-up primary care doctor and given parameters to return to the emerge department         All patient's question's and concerns were answered prior to disposition and patient and/or family expressed understanding and agreement of treatment plan. CRITICAL CARE:              NIH STROKE SCALE:            PROCEDURES:    Procedures    DIAGNOSTIC RESULTS   EKG:All EKG's are interpreted by the Emergency Department Physician who either signs or Co-signs this chart in the absence of a cardiologist.        RADIOLOGY:All plain film, CT, MRI, and formal ultrasound images (except ED bedside ultrasound) are read by the radiologist, see reports below, unless otherwisenoted in MDM or here. CT SOFT TISSUE NECK W CONTRAST   Final Result   1. No acute abnormality of the soft tissue structures of the neck. 2. The partially visualized esophagus appears mildly patulous containing an   air-fluid level, raising the possibility of gastroesophageal reflux. XR CHEST PORTABLE   Final Result   No acute airspace disease identified. LABS: All lab results were reviewed by myself, and all abnormals are listed below.   Labs Reviewed   CBC WITH AUTO DIFFERENTIAL - Abnormal; Notable for the following components:       Result Value    RBC 3.87 (*)     Hemoglobin 12.1 (*)     Hematocrit 37.1 (*)     Eosinophils % 9 (*)     Immature Granulocytes 1 (*)     Absolute Eos # 0.60 (*)     All other components within normal limits   BASIC METABOLIC PANEL W/ REFLEX TO MG FOR LOW K - Abnormal; Notable for the following components:    Glucose 240 (*)     CO2 19 (*)     All other components within normal limits   STREP SCREEN GROUP A THROAT   COVID-19       EMERGENCY DEPARTMENTCOURSE:         Vitals:    Vitals:    01/20/22 0740 01/20/22 0742 01/20/22 0743   BP:  (!) 179/93    Pulse:  87    Resp:  16    Temp:   97.9 °F (36.6 °C)   TempSrc:   Oral   SpO2:  95%    Weight: 190 lb (86.2 kg)         The patient was given the following medications while in the emergency department:  Orders Placed This Encounter   Medications    dexamethasone (PF) (DECADRON) injection 10 mg    iopamidol (ISOVUE-370) 76 % injection 75 mL    sodium chloride flush 0.9 % injection 10 mL    0.9 % sodium chloride bolus     CONSULTS:  None    FINAL IMPRESSION      1. Viral pharyngitis    2. Suspected COVID-19 virus infection          DISPOSITION/PLAN   DISPOSITION        PATIENT REFERRED TO:  Alberto Sheridan DO  [address] [place] [address] 22. [phone]          DISCHARGE MEDICATIONS:  New Prescriptions    No medications on file     Edwina Barrios MD  Attending Emergency Physician      The care is provided during an unprecedented national emergency due to the novel coronavirus, COVID 19. This note was created with the assistance of a speech-recognition program. While intending to generate a document that actually reflects the content of the visit, no guarantees can be provided that every mistake has been identified and corrected by editing.     Amadou Tolentino MD  05/57/45 0557

## 2022-01-21 ENCOUNTER — CARE COORDINATION (OUTPATIENT)
Dept: CARE COORDINATION | Age: 82
End: 2022-01-21

## 2022-01-21 NOTE — CARE COORDINATION
Ambulatory Care Coordination ED COVID Follow up Call    Challenges to be reviewed by the provider   Additional needs identified to be addressed with provider: No  none                 Encounter was not routed to provider for escalation. Method of communication with provider: none    Discussed COVID-19 related testing which was: available at this time. Test results were: positive. Patient informed of results, if available? Yes. Current Symptoms: no worsening symptoms    Reviewed New or Changed Meds: yes    Do you have what you need at home?  Durable Medical Equipment ordered at discharge: None   Home Health/Outpatient orders at discharge: none   Was patient discharged with a pulse oximeter? No Discussed and confirmed pulse oximeter discharge instructions and when to notify provider or seek emergency care. Patient education provided: Reviewed appropriate site of care based on symptoms and resources available to patient including: PCP. Follow up appointment recommended: yes. If no appointment scheduled, scheduling offered: yes  No future appointments. Interventions: Reviewed and followed up on pending diagnostic tests and treatments-coivd   Reviewed discharge instructions, medical action plan and red flags with patient who verbalized understanding. Plan for follow-up call in 5-7 days based on severity of symptoms and risk factors. Plan for next call: symptom management-sore throat  Provided contact information for future needs. Spoke with pt who said he feels better did let him know his covid was +. He said his daughter and son in law also have covid and this is probably where he got it.  He will quarantine until the [address], RN

## 2022-01-27 ENCOUNTER — CARE COORDINATION (OUTPATIENT)
Dept: CARE COORDINATION | Age: 82
End: 2022-01-27

## 2022-01-27 NOTE — CARE COORDINATION
Follow Up Call    Challenges to be reviewed by the provider   Additional needs identified to be addressed with provider: No  none                 Encounter was not routed to provider for escalation. Method of communication with provider:  none. Contacted the patient by telephone to follow up after ED. Status: significantly improved  Interventions to address identified needs: Reviewed and followed up on pending diagnostic tests and treatments-[facility] follow up appointment(s): No future appointments. Non-[facility] follow up appointment(s): no    Follow up appointment completed? No.    Provided contact information for future needs. No further follow-up call indicated based on severity of symptoms and risk factors.   Plan for next call: none    Pt said he feels back to normal and did not really feel that bad     Paulo Duggan RN

## 2022-03-23 ENCOUNTER — OFFICE VISIT (OUTPATIENT)
Dept: FAMILY MEDICINE CLINIC | Age: 82
End: 2022-03-23
Payer: MEDICARE

## 2022-03-23 VITALS
SYSTOLIC BLOOD PRESSURE: 135 MMHG | DIASTOLIC BLOOD PRESSURE: 75 MMHG | HEIGHT: 69 IN | WEIGHT: 190 LBS | OXYGEN SATURATION: 96 % | HEART RATE: 77 BPM | BODY MASS INDEX: 28.14 KG/M2

## 2022-03-23 DIAGNOSIS — Z76.89 ESTABLISHING CARE WITH NEW DOCTOR, ENCOUNTER FOR: Primary | ICD-10-CM

## 2022-03-23 DIAGNOSIS — Z12.11 SCREEN FOR COLON CANCER: ICD-10-CM

## 2022-03-23 DIAGNOSIS — E78.00 PURE HYPERCHOLESTEROLEMIA: ICD-10-CM

## 2022-03-23 DIAGNOSIS — N42.9 PROSTATE DISORDER: ICD-10-CM

## 2022-03-23 DIAGNOSIS — E11.9 TYPE 2 DIABETES MELLITUS WITHOUT COMPLICATION, WITHOUT LONG-TERM CURRENT USE OF INSULIN (HCC): ICD-10-CM

## 2022-03-23 DIAGNOSIS — C85.90 LYMPHOMA IN REMISSION (HCC): ICD-10-CM

## 2022-03-23 DIAGNOSIS — I10 PRIMARY HYPERTENSION: ICD-10-CM

## 2022-03-23 LAB — HBA1C MFR BLD: 7.3 %

## 2022-03-23 PROCEDURE — 83036 HEMOGLOBIN GLYCOSYLATED A1C: CPT | Performed by: FAMILY MEDICINE

## 2022-03-23 PROCEDURE — 3051F HG A1C>EQUAL 7.0%<8.0%: CPT | Performed by: FAMILY MEDICINE

## 2022-03-23 PROCEDURE — 99204 OFFICE O/P NEW MOD 45 MIN: CPT | Performed by: FAMILY MEDICINE

## 2022-03-23 SDOH — ECONOMIC STABILITY: FOOD INSECURITY: WITHIN THE PAST 12 MONTHS, THE FOOD YOU BOUGHT JUST DIDN'T LAST AND YOU DIDN'T HAVE MONEY TO GET MORE.: NEVER TRUE

## 2022-03-23 SDOH — ECONOMIC STABILITY: FOOD INSECURITY: WITHIN THE PAST 12 MONTHS, YOU WORRIED THAT YOUR FOOD WOULD RUN OUT BEFORE YOU GOT MONEY TO BUY MORE.: NEVER TRUE

## 2022-03-23 ASSESSMENT — PATIENT HEALTH QUESTIONNAIRE - PHQ9
SUM OF ALL RESPONSES TO PHQ9 QUESTIONS 1 & 2: 0
SUM OF ALL RESPONSES TO PHQ QUESTIONS 1-9: 0
SUM OF ALL RESPONSES TO PHQ QUESTIONS 1-9: 0
2. FEELING DOWN, DEPRESSED OR HOPELESS: 0
1. LITTLE INTEREST OR PLEASURE IN DOING THINGS: 0
SUM OF ALL RESPONSES TO PHQ QUESTIONS 1-9: 0
SUM OF ALL RESPONSES TO PHQ QUESTIONS 1-9: 0

## 2022-03-23 ASSESSMENT — SOCIAL DETERMINANTS OF HEALTH (SDOH): HOW HARD IS IT FOR YOU TO PAY FOR THE VERY BASICS LIKE FOOD, HOUSING, MEDICAL CARE, AND HEATING?: NOT HARD AT ALL

## 2022-03-24 ENCOUNTER — HOSPITAL ENCOUNTER (OUTPATIENT)
Age: 82
Discharge: HOME | End: 2022-03-24
Payer: MEDICARE

## 2022-03-24 DIAGNOSIS — E78.00 PURE HYPERCHOLESTEROLEMIA: ICD-10-CM

## 2022-03-24 DIAGNOSIS — N42.9 PROSTATE DISORDER: ICD-10-CM

## 2022-03-24 DIAGNOSIS — Z76.89 ESTABLISHING CARE WITH NEW DOCTOR, ENCOUNTER FOR: ICD-10-CM

## 2022-03-24 DIAGNOSIS — E11.9 TYPE 2 DIABETES MELLITUS WITHOUT COMPLICATION, WITHOUT LONG-TERM CURRENT USE OF INSULIN (HCC): ICD-10-CM

## 2022-03-24 DIAGNOSIS — R35.1 BENIGN PROSTATIC HYPERPLASIA WITH NOCTURIA: Primary | ICD-10-CM

## 2022-03-24 DIAGNOSIS — N40.1 BENIGN PROSTATIC HYPERPLASIA WITH NOCTURIA: Primary | ICD-10-CM

## 2022-03-24 PROBLEM — I10 HYPERTENSION: Status: ACTIVE | Noted: 2020-11-03

## 2022-03-24 LAB
ABSOLUTE EOS #: 0.32 K/UL (ref 0–0.44)
ABSOLUTE IMMATURE GRANULOCYTE: 0.03 K/UL (ref 0–0.3)
ABSOLUTE LYMPH #: 1.79 K/UL (ref 1.1–3.7)
ABSOLUTE MONO #: 0.59 K/UL (ref 0.1–1.2)
ALBUMIN SERPL-MCNC: 4.6 G/DL (ref 3.5–5.2)
ALBUMIN/GLOBULIN RATIO: 1.6 (ref 1–2.5)
ALP BLD-CCNC: 89 U/L (ref 40–129)
ALT SERPL-CCNC: 14 U/L (ref 5–41)
ALT SERPL-CCNC: 14 U/L (ref 5–41)
ANION GAP SERPL CALCULATED.3IONS-SCNC: 15 MMOL/L (ref 9–17)
AST SERPL-CCNC: 9 U/L
AST SERPL-CCNC: 9 U/L
BASOPHILS # BLD: 1 % (ref 0–2)
BASOPHILS ABSOLUTE: 0.04 K/UL (ref 0–0.2)
BILIRUB SERPL-MCNC: 0.25 MG/DL (ref 0.3–1.2)
BUN BLDV-MCNC: 37 MG/DL (ref 8–23)
CALCIUM SERPL-MCNC: 9.7 MG/DL (ref 8.6–10.4)
CHLORIDE BLD-SCNC: 102 MMOL/L (ref 98–107)
CHOLESTEROL, FASTING: 184 MG/DL
CHOLESTEROL/HDL RATIO: 4.4
CHOLESTEROL/HDL RATIO: 4.4
CHOLESTEROL: 184 MG/DL
CO2: 21 MMOL/L (ref 20–31)
CREAT SERPL-MCNC: 1.14 MG/DL (ref 0.7–1.2)
EOSINOPHILS RELATIVE PERCENT: 6 % (ref 1–4)
GFR AFRICAN AMERICAN: >60 ML/MIN
GFR NON-AFRICAN AMERICAN: >60 ML/MIN
GFR SERPL CREATININE-BSD FRML MDRD: ABNORMAL ML/MIN/{1.73_M2}
GLUCOSE BLD-MCNC: 144 MG/DL (ref 70–99)
HCT VFR BLD CALC: 37.7 % (ref 40.7–50.3)
HDLC SERPL-MCNC: 42 MG/DL
HDLC SERPL-MCNC: 42 MG/DL
HEMOGLOBIN: 12.6 G/DL (ref 13–17)
IMMATURE GRANULOCYTES: 1 %
LDL CHOLESTEROL: 100 MG/DL (ref 0–130)
LDL CHOLESTEROL: 100 MG/DL (ref 0–130)
LYMPHOCYTES # BLD: 32 % (ref 24–43)
MCH RBC QN AUTO: 31.8 PG (ref 25.2–33.5)
MCHC RBC AUTO-ENTMCNC: 33.4 G/DL (ref 28.4–34.8)
MCV RBC AUTO: 95.2 FL (ref 82.6–102.9)
MONOCYTES # BLD: 11 % (ref 3–12)
NRBC AUTOMATED: 0 PER 100 WBC
PDW BLD-RTO: 12.9 % (ref 11.8–14.4)
PLATELET # BLD: 292 K/UL (ref 138–453)
PMV BLD AUTO: 10.3 FL (ref 8.1–13.5)
POTASSIUM SERPL-SCNC: 4.9 MMOL/L (ref 3.7–5.3)
PROSTATE SPECIFIC ANTIGEN: 4.39 UG/L
RBC # BLD: 3.96 M/UL (ref 4.21–5.77)
SEG NEUTROPHILS: 49 % (ref 36–65)
SEGMENTED NEUTROPHILS ABSOLUTE COUNT: 2.86 K/UL (ref 1.5–8.1)
SEX HORMONE BINDING GLOBULIN: 28 NMOL/L (ref 11–80)
SODIUM BLD-SCNC: 138 MMOL/L (ref 135–144)
TESTOSTERONE FREE-NONMALE: 58.9 PG/ML (ref 47–244)
TESTOSTERONE TOTAL: 275 NG/DL (ref 220–1000)
THYROXINE, FREE: 1.04 NG/DL (ref 0.93–1.7)
TOTAL CK: 60 U/L (ref 39–308)
TOTAL PROTEIN: 7.4 G/DL (ref 6.4–8.3)
TRIGL SERPL-MCNC: 209 MG/DL
TRIGLYCERIDE, FASTING: 209 MG/DL
TSH SERPL DL<=0.05 MIU/L-ACNC: 3.6 MIU/L (ref 0.3–5)
WBC # BLD: 5.6 K/UL (ref 3.5–11.3)

## 2022-03-24 PROCEDURE — 84270 ASSAY OF SEX HORMONE GLOBUL: CPT

## 2022-03-24 PROCEDURE — 84450 TRANSFERASE (AST) (SGOT): CPT

## 2022-03-24 PROCEDURE — 36415 COLL VENOUS BLD VENIPUNCTURE: CPT

## 2022-03-24 PROCEDURE — 84153 ASSAY OF PSA TOTAL: CPT

## 2022-03-24 PROCEDURE — 84443 ASSAY THYROID STIM HORMONE: CPT

## 2022-03-24 PROCEDURE — 84439 ASSAY OF FREE THYROXINE: CPT

## 2022-03-24 PROCEDURE — 84403 ASSAY OF TOTAL TESTOSTERONE: CPT

## 2022-03-24 PROCEDURE — 80053 COMPREHEN METABOLIC PANEL: CPT

## 2022-03-24 PROCEDURE — 80061 LIPID PANEL: CPT

## 2022-03-24 PROCEDURE — 84460 ALANINE AMINO (ALT) (SGPT): CPT

## 2022-03-24 PROCEDURE — 85025 COMPLETE CBC W/AUTO DIFF WBC: CPT

## 2022-03-24 PROCEDURE — 82550 ASSAY OF CK (CPK): CPT

## 2022-03-24 RX ORDER — TAMSULOSIN HYDROCHLORIDE 0.4 MG/1
0.8 CAPSULE ORAL DAILY
Qty: 180 CAPSULE | Refills: 1 | Status: SHIPPED | OUTPATIENT
Start: 2022-03-24 | End: 2022-09-23

## 2022-03-24 NOTE — PROGRESS NOTES
[address] [facility]  [facility] [address] [facility] [address]  Dept: [phone]      Mark Victor is a 80 y.o. male who presents today for follow up on his  medical conditions as noted below. Chief Complaint   Patient presents with    New Patient       Patient Active Problem List:     Lymphoma in remission [facility])     Hypertension     Anxiety     Type II or unspecified type diabetes mellitus without mention of complication, not stated as uncontrolled     Gout     CAD (coronary artery disease)     GERD (gastroesophageal reflux disease)     Hoarseness of voice     Aphthous stomatitis     Acute pharyngitis     Hyperlipidemia, mixed     Contusion of second toe, left     Urinary frequency     HTN (hypertension)     Hyperlipemia, mixed     Headache     Headache     Past Medical History:   Diagnosis Date    Anxiety     with the chemotherapy     CAD (coronary artery disease)     Dr Princess Osuna, has three stents.     GERD (gastroesophageal reflux disease)     Gout     Hyperlipidemia age 79    Hypertension age 79    Lymphoma in remission [facility]) 2006    Dr Dacia Spangler, non hodgkin's    Type II or unspecified type diabetes mellitus without mention of complication, not stated as uncontrolled age 79      Past Surgical History:   Procedure Laterality Date    APPENDECTOMY  age 25   On license of [facility] [address] [place] [address]    CORONARY ANGIOPLASTY WITH STENT PLACEMENT      two occasions, one stent first, and then two the next time, Dr Deric Cartwright     Family History   Problem Relation Age of Onset    High Blood Pressure Sister     Diabetes Sister     High Blood Pressure Brother     Diabetes Brother        Current Outpatient Medications   Medication Sig Dispense Refill    metFORMIN (GLUCOPHAGE) 1000 MG tablet Take 1 tablet by mouth 2 times daily (with meals) 180 tablet 1    ibuprofen (ADVIL;MOTRIN) 800 MG tablet Take 800 mg by mouth every 6 hours as needed for Pain      glipiZIDE (GLIPIZIDE XL) 5 MG CR tablet TAKE 1 TABLET BY MOUTH DAILY 90 tablet 1    metFORMIN (GLUCOPHAGE) 500 MG tablet Take 1 tablet by mouth 2 times daily (with meals). 180 tablet 1    metoprolol (TOPROL-XL) 100 MG XL tablet TAKE 1 TABLET BY MOUTH DAILY 90 tablet 1    lisinopril (PRINIVIL;ZESTRIL) 10 MG tablet TAKE 1 TABLET BY MOUTH DAILY 90 tablet 1    pravastatin (PRAVACHOL) 80 MG tablet TAKE 1 TABLET BY MOUTH EVERY DAY 90 tablet 1    docusate sodium (COLACE) 100 MG capsule Take 1 capsule by mouth daily as needed for Constipation.  aspirin 325 MG tablet Take 325 mg by mouth daily.  tamsulosin (FLOMAX) 0.4 MG capsule Take 2 capsules by mouth daily 180 capsule 1    ondansetron (ZOFRAN ODT) 4 MG disintegrating tablet Take 1 tablet by mouth every 8 hours as needed for Nausea (Patient not taking: Reported on 3/23/2022) 20 tablet 0    allopurinol (ZYLOPRIM) 300 MG tablet TAKE 1 TABLET BY MOUTH DAILY (Patient not taking: Reported on 3/23/2022) 90 tablet 1    furosemide (LASIX) 20 MG tablet Take 1 tablet by mouth daily. (Patient not taking: Reported on 3/23/2022) 60 tablet 3    meclizine (ANTIVERT) 25 MG tablet Take 25 mg by mouth 4 times daily. (Patient not taking: Reported on 3/23/2022)      neomycin-polymyxin-dexameth 3.5-85212-2.1 OINT  (Patient not taking: Reported on 3/23/2022)  0    pantoprazole (PROTONIX) 40 MG tablet Take 1 tablet by mouth daily. 90 tablet 1    ondansetron (ZOFRAN ODT) 4 MG disintegrating tablet Take 1 tablet by mouth every 8 hours as needed for Nausea or Other (and as needed for dizziness). (Patient not taking: Reported on 3/23/2022) 20 tablet 0     No current facility-administered medications for this visit.      ALLERGIES:  No Known Allergies    Social History     Tobacco Use    Smoking status: Never Smoker    Smokeless tobacco: Never Used   Substance Use Topics    Alcohol use: No        LDL Cholesterol (mg/dL)   Date Value   03/24/2022 100   03/24/2022 100     HDL (mg/dL)   Date Value   03/24/2022 42   03/24/2022 42     BUN (mg/dL)   Date Value   03/24/2022 37 (H)     CREATININE (mg/dL)   Date Value   03/24/2022 1.14     Glucose (mg/dL)   Date Value   03/24/2022 144 (H)     Hemoglobin A1C (%)   Date Value   03/23/2022 7.3              Subjective:      HPI  Today for new patient to establish care has not been seen by his PCP in a while because he did not really care for him he has not been a very compliant patient has had a lot of ongoing medical problems he had a hemoglobin A1c today that was 7.5  He was taking Metformin but at 500 mg twice daily and glipizide he has a history of lymphoma but is not following with a oncologist he has not done laboratory studies in a long time he has a problem with a lot of urination getting up multiple times in the night  He has not had a PSA done in extremely long period of time he has had coronary artery disease he has not had any kind of a colon screen done  He states he eats right and takes care of himself so there is not a reason to do a lot of testing    Review of Systems:     Constitutional: Negative for fever, appetite change and fatigue. Family social and medical history reviewed and unchanged     HENT: Negative. Negative for nosebleeds, trouble swallowing and neck pain. Eyes: Negative for photophobia and visual disturbance. Respiratory: Negative. Negative for chest tightness and shortness of breath. Cardiovascular: Negative. Negative for chest pain and leg swelling. Gastrointestinal: Negative. Negative for abdominal pain and blood in stool. Endocrine: Negative for cold intolerance and polyuria. Genitourinary: Negative for dysuria and hematuria. Musculoskeletal: Negative. Skin: Negative for rash. Allergic/Immunologic: Negative. Neurological: Negative. Negative for dizziness, weakness and numbness. Hematological: Negative. Negative for adenopathy. Does not bruise/bleed easily. Psychiatric/Behavioral: Negative for sleep disturbance, dysphoric mood and  decreased concentration. The patient is not nervous/anxious. Objective:     Physical Exam:     Nursing note and vitals reviewed. /75   Pulse 77   Ht 5' 9\" (1.753 m)   Wt 190 lb (86.2 kg)   SpO2 96%   BMI 28.06 kg/m²   Constitutional: He is oriented to person, place, and time. He   appears well-developed and well-nourished. HENT:   Head: Normocephalic and atraumatic. Right Ear: External ear normal. Tympanic membrane is not erythematous. No middle ear effusion. Left Ear: External ear normal. Tympanic membrane is not erythematous. No middle ear effusion. Nose: No mucosal edema. Mouth/Throat: Oropharynx is clear and moist. No posterior oropharyngeal erythema. Eyes: Conjunctivae and EOM are normal. Pupils are equal, round, and reactive to light. Neck: Normal range of motion. Neck supple. No thyromegaly present. Cardiovascular: Normal rate, regular rhythm and normal heart sounds. No murmur heard. Pulmonary/Chest: Effort normal and breath sounds normal. He has no wheezes. Hehas no rales. Abdominal: Soft. Bowel sounds are normal. He exhibits no distension and no mass. There is no tenderness. There is no rebound and no guarding. Genitourinary/Anorectal:deferred  Musculoskeletal: Normal range of motion. He exhibits no edema or tenderness. Lymphadenopathy: He has no cervical adenopathy. Neurological: He is alert and oriented to person, place, and time. He has normal reflexes. Skin: Skin is warm and dry. No rash noted. Psychiatric: He has a normal mood and affect. His   behavior is normal.       Assessment:      1. Establishing care with new doctor, encounter for    2. Type 2 diabetes mellitus without complication, without long-term current use of insulin (HCC)    3. Pure hypercholesterolemia    4. Prostate disorder    5. Screen for colon cancer    6. Lymphoma in remission ([facility] [address]    7.  Primary hypertension          Plan:      Call or return to clinic prn if these symptoms worsen or fail to improve as anticipated. I have reviewed the instructions with the patient, answering all questions to his satisfaction. No follow-ups on file. Orders Placed This Encounter   Procedures    Fecal DNA Colorectal cancer screening (Cologuard)    CBC with Auto Differential     Standing Status:   Future     Number of Occurrences:   1     Standing Expiration Date:   9/23/2022    Comprehensive Metabolic Panel     Standing Status:   Future     Number of Occurrences:   1     Standing Expiration Date:   9/23/2022    Lipid Panel     Standing Status:   Future     Number of Occurrences:   1     Standing Expiration Date:   4/23/2022     Order Specific Question:   Is Patient Fasting?/# of Hours     Answer:    Yes    Testosterone, Free     Standing Status:   Future     Number of Occurrences:   1     Standing Expiration Date:   4/23/2022    T4, Free     Standing Status:   Future     Number of Occurrences:   1     Standing Expiration Date:   9/23/2022    PSA, Prostatic Specific Antigen     Standing Status:   Future     Number of Occurrences:   1     Standing Expiration Date:   9/23/2022    TSH     Standing Status:   Future     Number of Occurrences:   1     Standing Expiration Date:   9/23/2022    POCT glycosylated hemoglobin (Hb A1C)     Orders Placed This Encounter   Medications    metFORMIN (GLUCOPHAGE) 1000 MG tablet     Sig: Take 1 tablet by mouth 2 times daily (with meals)     Dispense:  180 tablet     Refill:  1     I did roper a little bit with him to explain why he needs to be doing some more preventative studies  He finally agreed that he would do some testing  He is to increase his Metformin to 1000 mg twice daily he did agree to the labs  And colon screen at home and will follow up with me on a more regular basis and will notify him of his testing results  Electronically signed by Evelio Ramey DO on 3/24/2022 at 5:19 PM

## 2022-03-25 ENCOUNTER — TELEPHONE (OUTPATIENT)
Dept: FAMILY MEDICINE CLINIC | Age: 82
End: 2022-03-25

## 2022-03-25 NOTE — TELEPHONE ENCOUNTER
----- Message from Delphinesarai Coleman sent at 3/25/2022 11:06 AM EDT -----  Subject: Message to Provider    QUESTIONS  Information for Provider? Pt's wife received a call from [address], called   back to speak with her and we were unable to get connected, pt would like   a call back. ---------------------------------------------------------------------------  --------------  Meredith CONNOR  What is the best way for the office to contact you? OK to leave message on   voicemail  Preferred Call Back Phone Number?  [phone]  ---------------------------------------------------------------------------  --------------  SCRIPT ANSWERS  undefined

## 2022-07-14 ENCOUNTER — TELEPHONE (OUTPATIENT)
Dept: FAMILY MEDICINE CLINIC | Age: 82
End: 2022-07-14

## 2022-07-14 NOTE — TELEPHONE ENCOUNTER
Patient wife called in stating that her  is having some pain in his  knee and she states he wants to know if you would give him some knee injections he has been having the pain for awhile he is going out of town and does not want to be in pain when he goes there.     Please advise

## 2022-07-15 ENCOUNTER — HOSPITAL ENCOUNTER (OUTPATIENT)
Dept: MRI IMAGING | Age: 82
Discharge: HOME | End: 2022-07-17
Payer: MEDICARE

## 2022-07-15 DIAGNOSIS — M48.062 LUMBAR STENOSIS WITH NEUROGENIC CLAUDICATION: ICD-10-CM

## 2022-07-15 PROCEDURE — 72148 MRI LUMBAR SPINE W/O DYE: CPT

## 2022-07-29 ENCOUNTER — OFFICE VISIT (OUTPATIENT)
Dept: FAMILY MEDICINE CLINIC | Age: 82
End: 2022-07-29
Payer: MEDICARE

## 2022-07-29 VITALS
OXYGEN SATURATION: 96 % | SYSTOLIC BLOOD PRESSURE: 110 MMHG | DIASTOLIC BLOOD PRESSURE: 66 MMHG | WEIGHT: 185.4 LBS | HEIGHT: 69 IN | BODY MASS INDEX: 27.46 KG/M2 | HEART RATE: 70 BPM

## 2022-07-29 DIAGNOSIS — E11.9 TYPE 2 DIABETES MELLITUS WITHOUT COMPLICATION, WITHOUT LONG-TERM CURRENT USE OF INSULIN (HCC): Primary | ICD-10-CM

## 2022-07-29 DIAGNOSIS — M25.562 CHRONIC PAIN OF LEFT KNEE: ICD-10-CM

## 2022-07-29 DIAGNOSIS — G89.29 CHRONIC PAIN OF LEFT KNEE: ICD-10-CM

## 2022-07-29 DIAGNOSIS — G89.29 CHRONIC HAND PAIN, RIGHT: ICD-10-CM

## 2022-07-29 DIAGNOSIS — M79.641 CHRONIC HAND PAIN, RIGHT: ICD-10-CM

## 2022-07-29 LAB — HBA1C MFR BLD: 5.7 %

## 2022-07-29 PROCEDURE — 99214 OFFICE O/P EST MOD 30 MIN: CPT | Performed by: FAMILY MEDICINE

## 2022-07-29 PROCEDURE — 1123F ACP DISCUSS/DSCN MKR DOCD: CPT | Performed by: FAMILY MEDICINE

## 2022-07-29 PROCEDURE — 83036 HEMOGLOBIN GLYCOSYLATED A1C: CPT | Performed by: FAMILY MEDICINE

## 2022-07-29 PROCEDURE — G8427 DOCREV CUR MEDS BY ELIG CLIN: HCPCS | Performed by: FAMILY MEDICINE

## 2022-07-29 PROCEDURE — 20610 DRAIN/INJ JOINT/BURSA W/O US: CPT | Performed by: FAMILY MEDICINE

## 2022-07-29 PROCEDURE — G8417 CALC BMI ABV UP PARAM F/U: HCPCS | Performed by: FAMILY MEDICINE

## 2022-07-29 PROCEDURE — 1036F TOBACCO NON-USER: CPT | Performed by: FAMILY MEDICINE

## 2022-07-29 PROCEDURE — 20600 DRAIN/INJ JOINT/BURSA W/O US: CPT | Performed by: FAMILY MEDICINE

## 2022-07-29 PROCEDURE — 3044F HG A1C LEVEL LT 7.0%: CPT | Performed by: FAMILY MEDICINE

## 2022-07-29 RX ORDER — TRIAMCINOLONE ACETONIDE 40 MG/ML
10 INJECTION, SUSPENSION INTRA-ARTICULAR; INTRAMUSCULAR ONCE
Status: COMPLETED | OUTPATIENT
Start: 2022-07-29 | End: 2022-07-29

## 2022-07-29 RX ORDER — TRIAMCINOLONE ACETONIDE 40 MG/ML
40 INJECTION, SUSPENSION INTRA-ARTICULAR; INTRAMUSCULAR ONCE
Status: COMPLETED | OUTPATIENT
Start: 2022-07-29 | End: 2022-07-29

## 2022-07-29 RX ADMIN — TRIAMCINOLONE ACETONIDE 10 MG: 40 INJECTION, SUSPENSION INTRA-ARTICULAR; INTRAMUSCULAR at 15:16

## 2022-07-29 RX ADMIN — TRIAMCINOLONE ACETONIDE 40 MG: 40 INJECTION, SUSPENSION INTRA-ARTICULAR; INTRAMUSCULAR at 15:18

## 2022-07-29 RX ADMIN — TRIAMCINOLONE ACETONIDE 10 MG: 40 INJECTION, SUSPENSION INTRA-ARTICULAR; INTRAMUSCULAR at 15:17

## 2022-07-29 NOTE — PROGRESS NOTES
[facility]  [facility] [address] [facility] [address]  Dept: [phone]      Jazmine Overton is a 80 y.o. male who presents today for follow up on his  medical conditions as noted below. Chief Complaint   Patient presents with    Diabetes    Knee Pain     Left     Hand Pain     Right        Patient Active Problem List:     Lymphoma in remission ([facility] [address]     Hypertension     Anxiety     Type II or unspecified type diabetes mellitus without mention of complication, not stated as uncontrolled     Gout     CAD (coronary artery disease)     GERD (gastroesophageal reflux disease)     Hoarseness of voice     Aphthous stomatitis     Acute pharyngitis     Hyperlipidemia, mixed     Contusion of second toe, left     Urinary frequency     HTN (hypertension)     Hyperlipemia, mixed     Headache     Headache     Past Medical History:   Diagnosis Date    Anxiety     with the chemotherapy     CAD (coronary artery disease)     Dr Ki Gabriel, has three stents. GERD (gastroesophageal reflux disease)     Gout     Hyperlipidemia age 79    Hypertension age 79    Lymphoma in remission [facility]) 2006    Dr Kael Esparza, non hodgkin's    Type II or unspecified type diabetes mellitus without mention of complication, not stated as uncontrolled age 79      Past Surgical History:   Procedure Laterality Date    APPENDECTOMY  age 25    [address]    CORONARY ANGIOPLASTY WITH STENT PLACEMENT      two occasions, one stent first, and then two the next time, Dr Gabrielle Walker     Family History   Problem Relation Age of Onset    High Blood Pressure Sister     Diabetes Sister     High Blood Pressure Brother     Diabetes Brother        Current Outpatient Medications   Medication Sig Dispense Refill    diclofenac sodium (VOLTAREN) 1 % GEL Apply 4 g topically in the morning and 4 g at noon and 4 g in the evening and 4 g before bedtime.  150 g 5    tamsulosin (FLOMAX) 0.4 MG capsule Take 2 capsules by mouth daily 180 capsule 1    metFORMIN (GLUCOPHAGE) 1000 MG tablet Take 1 tablet by mouth 2 times daily (with meals) 180 tablet 1    ibuprofen (ADVIL;MOTRIN) 800 MG tablet Take 800 mg by mouth every 6 hours as needed for Pain      glipiZIDE (GLIPIZIDE XL) 5 MG CR tablet TAKE 1 TABLET BY MOUTH DAILY 90 tablet 1    metoprolol (TOPROL-XL) 100 MG XL tablet TAKE 1 TABLET BY MOUTH DAILY 90 tablet 1    lisinopril (PRINIVIL;ZESTRIL) 10 MG tablet TAKE 1 TABLET BY MOUTH DAILY 90 tablet 1    docusate sodium (COLACE) 100 MG capsule Take 1 capsule by mouth daily as needed for Constipation. aspirin 325 MG tablet Take 325 mg by mouth daily. ondansetron (ZOFRAN ODT) 4 MG disintegrating tablet Take 1 tablet by mouth every 8 hours as needed for Nausea (Patient not taking: No sig reported) 20 tablet 0    metFORMIN (GLUCOPHAGE) 500 MG tablet Take 1 tablet by mouth 2 times daily (with meals). (Patient not taking: Reported on 7/29/2022) 180 tablet 1    allopurinol (ZYLOPRIM) 300 MG tablet TAKE 1 TABLET BY MOUTH DAILY (Patient not taking: No sig reported) 90 tablet 1    pravastatin (PRAVACHOL) 80 MG tablet TAKE 1 TABLET BY MOUTH EVERY DAY 90 tablet 1    furosemide (LASIX) 20 MG tablet Take 1 tablet by mouth daily. (Patient not taking: No sig reported) 60 tablet 3    meclizine (ANTIVERT) 25 MG tablet Take 25 mg by mouth 4 times daily. (Patient not taking: No sig reported)      neomycin-polymyxin-dexameth 3.5-26419-9.1 OINT  (Patient not taking: No sig reported)  0    pantoprazole (PROTONIX) 40 MG tablet Take 1 tablet by mouth daily. 90 tablet 1    ondansetron (ZOFRAN ODT) 4 MG disintegrating tablet Take 1 tablet by mouth every 8 hours as needed for Nausea or Other (and as needed for dizziness). (Patient not taking: No sig reported) 20 tablet 0     No current facility-administered medications for this visit.      ALLERGIES:  No Known Allergies    Social History     Tobacco Use    Smoking status: Never    Smokeless tobacco: Never   Substance Use Topics    Alcohol use: No        LDL Cholesterol (mg/dL)   Date Value   03/24/2022 100   03/24/2022 100     HDL (mg/dL)   Date Value   03/24/2022 42   03/24/2022 42     BUN (mg/dL)   Date Value   03/24/2022 37 (H)     Creatinine (mg/dL)   Date Value   03/24/2022 1.14     Glucose (mg/dL)   Date Value   03/24/2022 144 (H)     Hemoglobin A1C (%)   Date Value   07/29/2022 5.7              Subjective:      HPI  Being seen today for follow-up on chronic knee pain he has had injections in the past with orthopedics  He would like to get a knee injection today it greatly helps him he has no interest in getting a knee replacement although he desperately needs  He also has ongoing chronic hand pain worse on the right side I would also like his first 2 fingers injected at the MCP area where he hurts he has had this also done in the past and works great for him  Following up on his diabetes his hemoglobin A1c is well controlled  And his blood pressure is also doing quite well    Review of Systems:     Constitutional: Negative for fever, appetite change and fatigue. Family social and medical history reviewed and unchanged     HENT: Negative. Negative for nosebleeds, trouble swallowing and neck pain. Eyes: Negative for photophobia and visual disturbance. Respiratory: Negative. Negative for chest tightness and shortness of breath. Cardiovascular: Negative. Negative for chest pain and leg swelling. Gastrointestinal: Negative. Negative for abdominal pain and blood in stool. Endocrine: Negative for cold intolerance and polyuria. Genitourinary: Negative for dysuria and hematuria. Musculoskeletal: Negative. Skin: Negative for rash. Allergic/Immunologic: Negative. Neurological: Negative. Negative for dizziness, weakness and numbness. Hematological: Negative. Negative for adenopathy. Does not bruise/bleed easily.    Psychiatric/Behavioral: Negative for sleep disturbance, dysphoric mood and  decreased concentration. The patient is not nervous/anxious. Objective:     Physical Exam:     Nursing note and vitals reviewed. /66   Pulse 70   Ht 5' 9\" (1.753 m)   Wt 185 lb 6.4 oz (84.1 kg)   SpO2 96%   BMI 27.38 kg/m²   Constitutional: He is oriented to person, place, and time. He   appears well-developed and well-nourished. HENT:   Head: Normocephalic and atraumatic. Right Ear: External ear normal. Tympanic membrane is not erythematous. No middle ear effusion. Left Ear: External ear normal. Tympanic membrane is not erythematous. No middle ear effusion. Nose: No mucosal edema. Mouth/Throat: Oropharynx is clear and moist. No posterior oropharyngeal erythema. Eyes: Conjunctivae and EOM are normal. Pupils are equal, round, and reactive to light. Neck: Normal range of motion. Neck supple. No thyromegaly present. Cardiovascular: Normal rate, regular rhythm and normal heart sounds. No murmur heard. Pulmonary/Chest: Effort normal and breath sounds normal. He has no wheezes. Hehas no rales. Abdominal: Soft. Bowel sounds are normal. He exhibits no distension and no mass. There is no tenderness. There is no rebound and no guarding. Genitourinary/Anorectal:deferred  Musculoskeletal: Normal range of motion. He exhibits no edema or tenderness. Lymphadenopathy: He has no cervical adenopathy. Neurological: He is alert and oriented to person, place, and time. He has normal reflexes. Skin: Skin is warm and dry. No rash noted. Psychiatric: He has a normal mood and affect. His   behavior is normal.       Assessment:      1. Type 2 diabetes mellitus without complication, without long-term current use of insulin ([address]    2. Chronic pain of left knee    3. Chronic hand pain, right          Plan:      Call or return to clinic prn if these symptoms worsen or fail to improve as anticipated.   I have reviewed the instructions with the patient, answering all questions to his satisfaction. No follow-ups on file. Orders Placed This Encounter   Procedures    POCT glycosylated hemoglobin (Hb A1C)    20610 - [place] DRAIN/INJECT LARGE JOINT/BURSA    20600 - [place] DRAIN/INJECT SMALL JOINT/BURSA    20600 - [place] DRAIN/INJECT SMALL JOINT/BURSA     Orders Placed This Encounter   Medications    triamcinolone acetonide (KENALOG-40) injection 40 mg    triamcinolone acetonide (KENALOG-40) injection 10 mg    triamcinolone acetonide (KENALOG-40) injection 10 mg    diclofenac sodium (VOLTAREN) 1 % GEL     Sig: Apply 4 g topically in the morning and 4 g at noon and 4 g in the evening and 4 g before bedtime. Dispense:  150 g     Refill:  5   Joint injection procedure: Informed consent obtained. 1st and second finger right a mcp is identified and marked. Cleansed  with betadine, sprayed with ethyl chloride. Injected with . 25cc of 1% lidocaine, . 25cc 10mg DepoMedrol injected into joint space. Good hemostasis. Pt tolerated well. Dressing applied. Post injection instructions given to patient. Joint injection procedure: Informed consent obtained. knee left is identified and marked. Cleansed  with betadine, sprayed with ethyl chloride. Injected with 3-1, 3 cc of 1% lidocaine, 1 cc 40mg kenalog injected into joint space. Good hemostasis. Pt tolerated well. Dressing applied. Post injection instructions given to patient.    Electronically signed by Radames Merchant DO on 7/31/2022 at 5:12 PM     Is to continue all of his medications  I also have sent in Voltaren gel for him to use on his hands

## 2022-07-31 ASSESSMENT — PATIENT HEALTH QUESTIONNAIRE - PHQ9
SUM OF ALL RESPONSES TO PHQ QUESTIONS 1-9: 0
1. LITTLE INTEREST OR PLEASURE IN DOING THINGS: 0
SUM OF ALL RESPONSES TO PHQ QUESTIONS 1-9: 0
SUM OF ALL RESPONSES TO PHQ9 QUESTIONS 1 & 2: 0
2. FEELING DOWN, DEPRESSED OR HOPELESS: 0

## 2022-08-01 ENCOUNTER — TELEPHONE (OUTPATIENT)
Dept: FAMILY MEDICINE CLINIC | Age: 82
End: 2022-08-01

## 2022-08-01 NOTE — TELEPHONE ENCOUNTER
This is probably secondary to BPH another was enlargement of his prostate  I can call in the medication for this if it does not resolve he needs to see urology

## 2022-08-01 NOTE — TELEPHONE ENCOUNTER
Patients wife called in stating that the patient is having frequent urination she stated he is constantly getting up using the restroom and she state she forgot to let you know at the patients appt    He is getting up all through the night using th restroom    Please advise

## 2022-09-23 DIAGNOSIS — R35.1 BENIGN PROSTATIC HYPERPLASIA WITH NOCTURIA: ICD-10-CM

## 2022-09-23 DIAGNOSIS — N40.1 BENIGN PROSTATIC HYPERPLASIA WITH NOCTURIA: ICD-10-CM

## 2022-09-23 RX ORDER — TAMSULOSIN HYDROCHLORIDE 0.4 MG/1
CAPSULE ORAL
Qty: 180 CAPSULE | Refills: 1 | Status: SHIPPED | OUTPATIENT
Start: 2022-09-23

## 2022-09-23 NOTE — TELEPHONE ENCOUNTER
Komal Parikh is calling to request a refill on the following medication(s):    Last Visit Date (If Applicable):  2/58/0253    Next Visit Date:    Visit date not found    Medication Request:  Requested Prescriptions     Pending Prescriptions Disp Refills    tamsulosin (FLOMAX) 0.4 MG capsule [Pharmacy Med Name: TAMSULOSIN HCL 0.4 MG CAPSULE] 180 capsule 1     Sig: TAKE TWO CAPSULES BY MOUTH DAILY    metFORMIN (GLUCOPHAGE) 1000 MG tablet [Pharmacy Med Name: metFORMIN HCL 1,000 MG TABLET] 180 tablet 1     Sig: TAKE ONE TABLET BY MOUTH TWICE A DAY WITH A MEAL

## 2022-12-16 ENCOUNTER — TELEPHONE (OUTPATIENT)
Dept: FAMILY MEDICINE CLINIC | Age: 82
End: 2022-12-16

## 2022-12-16 RX ORDER — AMOXICILLIN 500 MG/1
1000 CAPSULE ORAL 2 TIMES DAILY
Qty: 40 CAPSULE | Refills: 0 | Status: SHIPPED | OUTPATIENT
Start: 2022-12-16 | End: 2022-12-26

## 2022-12-16 NOTE — TELEPHONE ENCOUNTER
Patient is requesting an antibiotic sent to pharmacy. Patient has c/o cough with mucous sore throat, headache x 2 days. Patient denies any other symptoms. , pharmacy is current in chart. Spouse feels symptoms are those of an \"sinus infection\".

## 2023-04-24 RX ORDER — IBUPROFEN 800 MG/1
800 TABLET ORAL EVERY 8 HOURS PRN
Qty: 90 TABLET | Refills: 1 | Status: SHIPPED | OUTPATIENT
Start: 2023-04-24

## 2023-04-24 NOTE — TELEPHONE ENCOUNTER
Nanda Fernandez is calling to request a refill on the following medication(s):    Last Visit Date (If Applicable):  2/52/7226    Next Visit Date:    Visit date not found    Medication Request:  Requested Prescriptions     Pending Prescriptions Disp Refills    ibuprofen (ADVIL;MOTRIN) 800 MG tablet 120 tablet      Sig: Take 1 tablet by mouth every 6 hours as needed for Pain

## 2023-06-05 ENCOUNTER — OFFICE VISIT (OUTPATIENT)
Dept: FAMILY MEDICINE CLINIC | Age: 83
End: 2023-06-05
Payer: MEDICARE

## 2023-06-05 VITALS
OXYGEN SATURATION: 97 % | DIASTOLIC BLOOD PRESSURE: 62 MMHG | SYSTOLIC BLOOD PRESSURE: 109 MMHG | WEIGHT: 180.2 LBS | HEART RATE: 61 BPM | HEIGHT: 69 IN | BODY MASS INDEX: 26.69 KG/M2

## 2023-06-05 DIAGNOSIS — E78.00 PURE HYPERCHOLESTEROLEMIA: ICD-10-CM

## 2023-06-05 DIAGNOSIS — I10 PRIMARY HYPERTENSION: ICD-10-CM

## 2023-06-05 DIAGNOSIS — R35.1 BENIGN PROSTATIC HYPERPLASIA WITH NOCTURIA: ICD-10-CM

## 2023-06-05 DIAGNOSIS — N42.9 PROSTATE DISORDER: ICD-10-CM

## 2023-06-05 DIAGNOSIS — Z00.00 INITIAL MEDICARE ANNUAL WELLNESS VISIT: ICD-10-CM

## 2023-06-05 DIAGNOSIS — E78.2 HYPERLIPEMIA, MIXED: ICD-10-CM

## 2023-06-05 DIAGNOSIS — N40.1 BENIGN PROSTATIC HYPERPLASIA WITH NOCTURIA: ICD-10-CM

## 2023-06-05 DIAGNOSIS — C85.90 LYMPHOMA IN REMISSION (HCC): ICD-10-CM

## 2023-06-05 DIAGNOSIS — Z00.00 MEDICARE ANNUAL WELLNESS VISIT, SUBSEQUENT: Primary | ICD-10-CM

## 2023-06-05 DIAGNOSIS — E11.9 TYPE 2 DIABETES MELLITUS WITHOUT COMPLICATION, WITHOUT LONG-TERM CURRENT USE OF INSULIN (HCC): ICD-10-CM

## 2023-06-05 LAB — HBA1C MFR BLD: 6.6 %

## 2023-06-05 PROCEDURE — 1123F ACP DISCUSS/DSCN MKR DOCD: CPT | Performed by: FAMILY MEDICINE

## 2023-06-05 PROCEDURE — 3074F SYST BP LT 130 MM HG: CPT | Performed by: FAMILY MEDICINE

## 2023-06-05 PROCEDURE — G0438 PPPS, INITIAL VISIT: HCPCS | Performed by: FAMILY MEDICINE

## 2023-06-05 PROCEDURE — 3078F DIAST BP <80 MM HG: CPT | Performed by: FAMILY MEDICINE

## 2023-06-05 PROCEDURE — 83036 HEMOGLOBIN GLYCOSYLATED A1C: CPT | Performed by: FAMILY MEDICINE

## 2023-06-05 RX ORDER — SEMAGLUTIDE 1.34 MG/ML
0.25 INJECTION, SOLUTION SUBCUTANEOUS WEEKLY
Qty: 1.5 ADJUSTABLE DOSE PRE-FILLED PEN SYRINGE | Refills: 1 | Status: SHIPPED | OUTPATIENT
Start: 2023-06-05 | End: 2023-07-05

## 2023-06-05 RX ORDER — METOPROLOL SUCCINATE 100 MG/1
TABLET, EXTENDED RELEASE ORAL
Qty: 90 TABLET | Refills: 1 | Status: SHIPPED | OUTPATIENT
Start: 2023-06-05

## 2023-06-05 RX ORDER — PRAVASTATIN SODIUM 80 MG/1
TABLET ORAL
Qty: 90 TABLET | Refills: 1 | Status: SHIPPED | OUTPATIENT
Start: 2023-06-05

## 2023-06-05 RX ORDER — LISINOPRIL 10 MG/1
TABLET ORAL
Qty: 90 TABLET | Refills: 1 | Status: SHIPPED | OUTPATIENT
Start: 2023-06-05

## 2023-06-05 SDOH — ECONOMIC STABILITY: HOUSING INSECURITY
IN THE LAST 12 MONTHS, WAS THERE A TIME WHEN YOU DID NOT HAVE A STEADY PLACE TO SLEEP OR SLEPT IN A SHELTER (INCLUDING NOW)?: NO

## 2023-06-05 SDOH — ECONOMIC STABILITY: INCOME INSECURITY: HOW HARD IS IT FOR YOU TO PAY FOR THE VERY BASICS LIKE FOOD, HOUSING, MEDICAL CARE, AND HEATING?: NOT HARD AT ALL

## 2023-06-05 SDOH — ECONOMIC STABILITY: FOOD INSECURITY: WITHIN THE PAST 12 MONTHS, THE FOOD YOU BOUGHT JUST DIDN'T LAST AND YOU DIDN'T HAVE MONEY TO GET MORE.: NEVER TRUE

## 2023-06-05 SDOH — ECONOMIC STABILITY: FOOD INSECURITY: WITHIN THE PAST 12 MONTHS, YOU WORRIED THAT YOUR FOOD WOULD RUN OUT BEFORE YOU GOT MONEY TO BUY MORE.: NEVER TRUE

## 2023-06-05 ASSESSMENT — PATIENT HEALTH QUESTIONNAIRE - PHQ9
2. FEELING DOWN, DEPRESSED OR HOPELESS: 0
SUM OF ALL RESPONSES TO PHQ QUESTIONS 1-9: 0
1. LITTLE INTEREST OR PLEASURE IN DOING THINGS: 0
SUM OF ALL RESPONSES TO PHQ9 QUESTIONS 1 & 2: 0
SUM OF ALL RESPONSES TO PHQ QUESTIONS 1-9: 0

## 2023-06-05 ASSESSMENT — LIFESTYLE VARIABLES
HOW OFTEN DO YOU HAVE A DRINK CONTAINING ALCOHOL: NEVER
HOW MANY STANDARD DRINKS CONTAINING ALCOHOL DO YOU HAVE ON A TYPICAL DAY: PATIENT DOES NOT DRINK

## 2023-06-05 NOTE — PROGRESS NOTES
R-1Normal    Recommendations for Preventive Services Due: see orders and patient instructions/AVS.  Recommended screening schedule for the next 5-10 years is provided to the patient in written form: see Patient Instructions/AVS.     No follow-ups on file. Subjective   The following acute and/or chronic problems were also addressed today:  He is here today for follow-up of all of his ongoing medical issues as well as a Medicare wellness exam his A1c today is 6.6 he would like to try Ozempic he is taking all of his other medications and does need refills as well as lab work  Patient's complete [address] and screening values have been reviewed and are found in [address] [facility]. The following problems were reviewed today and where indicated follow up appointments were made and/or referrals ordered. Positive Risk Factor Screenings with Interventions:                       Advanced Directives:  Do you have a Living Will?: (!) No    Intervention:  has NO advanced directive - information provided                       Objective   Vitals:    06/05/23 1333   BP: 109/62   Pulse: 61   SpO2: 97%   Weight: 180 lb 3.2 oz (81.7 kg)   Height: 5' 9\" (1.753 m)      Body mass index is 26.61 kg/m².       General Appearance: alert and oriented to person, place and time, well developed and well- nourished, in no acute distress  Skin: warm and dry, no rash or erythema  Head: normocephalic and atraumatic  Eyes: pupils equal, round, and reactive to light, extraocular eye movements intact, conjunctivae normal  ENT: tympanic membrane, external ear and ear canal normal bilaterally, nose without deformity, nasal mucosa and turbinates normal without polyps  Neck: supple and non-tender without mass, no thyromegaly or thyroid nodules, no cervical lymphadenopathy  Pulmonary/Chest: clear to auscultation bilaterally- no wheezes, rales or rhonchi, normal air movement, no respiratory distress  Cardiovascular: normal rate, regular rhythm,

## 2023-06-05 NOTE — PROGRESS NOTES
(Patient taking differently: daily (with breakfast)) 180 tablet 1    diclofenac sodium (VOLTAREN) 1 % GEL Apply 4 g topically in the morning and 4 g at noon and 4 g in the evening and 4 g before bedtime. 150 g 5    glipiZIDE (GLIPIZIDE XL) 5 MG CR tablet TAKE 1 TABLET BY MOUTH DAILY 90 tablet 1    metoprolol (TOPROL-XL) 100 MG XL tablet TAKE 1 TABLET BY MOUTH DAILY 90 tablet 1    lisinopril (PRINIVIL;ZESTRIL) 10 MG tablet TAKE 1 TABLET BY MOUTH DAILY 90 tablet 1    pravastatin (PRAVACHOL) 80 MG tablet TAKE 1 TABLET BY MOUTH EVERY DAY 90 tablet 1    docusate sodium (COLACE) 100 MG capsule Take 1 capsule by mouth daily as needed for Constipation      aspirin 325 MG tablet Take 1 tablet by mouth daily      tamsulosin (FLOMAX) 0.4 MG capsule TAKE TWO CAPSULES BY MOUTH DAILY (Patient not taking: Reported on 6/5/2023) 180 capsule 1    ondansetron (ZOFRAN ODT) 4 MG disintegrating tablet Take 1 tablet by mouth every 8 hours as needed for Nausea (Patient not taking: Reported on 3/23/2022) 20 tablet 0    metFORMIN (GLUCOPHAGE) 500 MG tablet Take 1 tablet by mouth 2 times daily (with meals). (Patient not taking: Reported on 7/29/2022) 180 tablet 1    allopurinol (ZYLOPRIM) 300 MG tablet TAKE 1 TABLET BY MOUTH DAILY (Patient not taking: Reported on 3/23/2022) 90 tablet 1    furosemide (LASIX) 20 MG tablet Take 1 tablet by mouth daily. (Patient not taking: Reported on 3/23/2022) 60 tablet 3    meclizine (ANTIVERT) 25 MG tablet Take 25 mg by mouth 4 times daily. (Patient not taking: Reported on 3/23/2022)      neomycin-polymyxin-dexameth 3.5-53757-3.1 OINT  (Patient not taking: Reported on 3/23/2022)  0    pantoprazole (PROTONIX) 40 MG tablet Take 1 tablet by mouth daily. 90 tablet 1    ondansetron (ZOFRAN ODT) 4 MG disintegrating tablet Take 1 tablet by mouth every 8 hours as needed for Nausea or Other (and as needed for dizziness).  (Patient not taking: Reported on 3/23/2022) 20 tablet 0     No current facility-administered

## 2023-06-05 NOTE — PATIENT INSTRUCTIONS
have a serious illness that gets worse over time or can't be cured? What are you most afraid of that might happen? (Maybe you're afraid of having pain, losing your independence, or being kept alive by machines.)  Where would you prefer to die? (Your home? A hospital? A nursing home?)  Do you want to donate your organs when you die? Do you want certain Taoist practices performed before you die? When should you call for help? Be sure to contact your doctor if you have any questions. Where can you learn more? Go to http://www.craven.com/ and enter R264 to learn more about \"Advance Directives: Care Instructions. \"  Current as of: June 16, 2022               Content Version: 13.6  © 2006-2023 [employer]. Care instructions adapted under license by [facility] [place] ([facility]). If you have questions about a medical condition or this instruction, always ask your healthcare professional. [place] [address] any warranty or liability for your use of this information. A Healthy Heart: Care Instructions  Your Care Instructions     Coronary artery disease, also called heart disease, occurs when a substance called plaque builds up in the vessels that supply oxygen-rich blood to your heart muscle. This can narrow the blood vessels and reduce blood flow. A heart attack happens when blood flow is completely blocked. A high-fat diet, smoking, and other factors increase the risk of heart disease. Your doctor has found that you have a chance of having heart disease. You can do lots of things to keep your heart healthy. It may not be easy, but you can change your diet, exercise more, and quit smoking. These steps really work to lower your chance of heart disease. Follow-up care is a key part of your treatment and safety. Be sure to make and go to all appointments, and call your doctor if you are having problems.  It's also a good idea to know your test results and keep a list of the

## 2023-06-12 LAB
ALBUMIN SERPL-MCNC: NORMAL G/DL
ALP BLD-CCNC: NORMAL U/L
ALT SERPL-CCNC: NORMAL U/L
ANION GAP SERPL CALCULATED.3IONS-SCNC: NORMAL MMOL/L
AST SERPL-CCNC: NORMAL U/L
BASOPHILS ABSOLUTE: NORMAL
BASOPHILS RELATIVE PERCENT: NORMAL
BILIRUB SERPL-MCNC: NORMAL MG/DL
BUN BLDV-MCNC: NORMAL MG/DL
CALCIUM SERPL-MCNC: NORMAL MG/DL
CHLORIDE BLD-SCNC: NORMAL MMOL/L
CHOLESTEROL, TOTAL: 161 MG/DL
CHOLESTEROL/HDL RATIO: 3.1
CO2: NORMAL
CREAT SERPL-MCNC: NORMAL MG/DL
EGFR: NORMAL
EOSINOPHILS ABSOLUTE: NORMAL
EOSINOPHILS RELATIVE PERCENT: NORMAL
GLUCOSE BLD-MCNC: NORMAL MG/DL
HCT VFR BLD CALC: NORMAL %
HDLC SERPL-MCNC: 52 MG/DL (ref 35–70)
HEMOGLOBIN: NORMAL
LDL CHOLESTEROL CALCULATED: 80 MG/DL (ref 0–160)
LYMPHOCYTES ABSOLUTE: NORMAL
LYMPHOCYTES RELATIVE PERCENT: NORMAL
MCH RBC QN AUTO: NORMAL PG
MCHC RBC AUTO-ENTMCNC: NORMAL G/DL
MCV RBC AUTO: NORMAL FL
MONOCYTES ABSOLUTE: NORMAL
MONOCYTES RELATIVE PERCENT: NORMAL
NEUTROPHILS ABSOLUTE: NORMAL
NEUTROPHILS RELATIVE PERCENT: NORMAL
NONHDLC SERPL-MCNC: NORMAL MG/DL
PDW BLD-RTO: NORMAL %
PLATELET # BLD: NORMAL 10*3/UL
PMV BLD AUTO: NORMAL FL
POTASSIUM SERPL-SCNC: NORMAL MMOL/L
PROSTATE SPECIFIC ANTIGEN: NORMAL
RBC # BLD: NORMAL 10*6/UL
SODIUM BLD-SCNC: NORMAL MMOL/L
T4 FREE: NORMAL
TOTAL PROTEIN: NORMAL
TRIGL SERPL-MCNC: 143 MG/DL
TSH SERPL DL<=0.05 MIU/L-ACNC: NORMAL M[IU]/L
VLDLC SERPL CALC-MCNC: 29 MG/DL
WBC # BLD: NORMAL 10*3/UL

## 2023-06-15 ENCOUNTER — TELEPHONE (OUTPATIENT)
Dept: FAMILY MEDICINE CLINIC | Age: 83
End: 2023-06-15

## 2023-06-15 DIAGNOSIS — N40.1 BENIGN PROSTATIC HYPERPLASIA WITH NOCTURIA: ICD-10-CM

## 2023-06-15 DIAGNOSIS — C85.90 LYMPHOMA IN REMISSION (HCC): ICD-10-CM

## 2023-06-15 DIAGNOSIS — I10 PRIMARY HYPERTENSION: ICD-10-CM

## 2023-06-15 DIAGNOSIS — R35.1 BENIGN PROSTATIC HYPERPLASIA WITH NOCTURIA: ICD-10-CM

## 2023-06-15 DIAGNOSIS — E78.00 PURE HYPERCHOLESTEROLEMIA: ICD-10-CM

## 2023-06-15 DIAGNOSIS — E11.9 TYPE 2 DIABETES MELLITUS WITHOUT COMPLICATION, WITHOUT LONG-TERM CURRENT USE OF INSULIN (HCC): ICD-10-CM

## 2023-06-15 DIAGNOSIS — N42.9 PROSTATE DISORDER: ICD-10-CM

## 2023-06-17 RX ORDER — GLIPIZIDE 5 MG/1
5 TABLET ORAL 2 TIMES DAILY
Qty: 60 TABLET | Refills: 0 | Status: SHIPPED | OUTPATIENT
Start: 2023-06-17

## 2023-07-13 ENCOUNTER — APPOINTMENT (OUTPATIENT)
Dept: GENERAL RADIOLOGY | Age: 83
End: 2023-07-13
Payer: MEDICARE

## 2023-07-13 ENCOUNTER — APPOINTMENT (OUTPATIENT)
Dept: CT IMAGING | Age: 83
End: 2023-07-13
Payer: MEDICARE

## 2023-07-13 ENCOUNTER — HOSPITAL ENCOUNTER (EMERGENCY)
Age: 83
Discharge: HOME | End: 2023-07-13
Attending: EMERGENCY MEDICINE
Payer: MEDICARE

## 2023-07-13 VITALS
WEIGHT: 175 LBS | RESPIRATION RATE: 18 BRPM | TEMPERATURE: 98.1 F | BODY MASS INDEX: 25.92 KG/M2 | DIASTOLIC BLOOD PRESSURE: 79 MMHG | HEART RATE: 66 BPM | OXYGEN SATURATION: 98 % | HEIGHT: 69 IN | SYSTOLIC BLOOD PRESSURE: 159 MMHG

## 2023-07-13 DIAGNOSIS — J18.9 PNEUMONIA DUE TO INFECTIOUS ORGANISM, UNSPECIFIED LATERALITY, UNSPECIFIED PART OF LUNG: ICD-10-CM
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
## 2023-07-13 LAB
ANION GAP SERPL CALCULATED.3IONS-SCNC: 12 MMOL/L (ref 9–17)
BASOPHILS # BLD: 0.05 K/UL (ref 0–0.2)
BASOPHILS NFR BLD: 1 % (ref 0–2)
BNP SERPL-MCNC: 187 PG/ML
BUN SERPL-MCNC: 34 MG/DL (ref 8–23)
BUN/CREAT SERPL: 28 (ref 9–20)
CALCIUM SERPL-MCNC: 9.3 MG/DL (ref 8.6–10.4)
CHLORIDE SERPL-SCNC: 104 MMOL/L (ref 98–107)
CO2 SERPL-SCNC: 22 MMOL/L (ref 20–31)
CREAT SERPL-MCNC: 1.2 MG/DL (ref 0.7–1.2)
EOSINOPHIL # BLD: 0.36 K/UL (ref 0–0.44)
EOSINOPHILS RELATIVE PERCENT: 4 % (ref 1–4)
ERYTHROCYTE [DISTWIDTH] IN BLOOD BY AUTOMATED COUNT: 13.1 % (ref 11.8–14.4)
GFR SERPL CREATININE-BSD FRML MDRD: >60 ML/MIN/1.73M2
GLUCOSE SERPL-MCNC: 76 MG/DL (ref 70–99)
HCT VFR BLD AUTO: 33.4 % (ref 40.7–50.3)
HGB BLD-MCNC: 11.1 G/DL (ref 13–17)
IMM GRANULOCYTES # BLD AUTO: 0.08 K/UL (ref 0–0.3)
IMM GRANULOCYTES NFR BLD: 1 %
LYMPHOCYTES # BLD: 20 % (ref 24–43)
LYMPHOCYTES NFR BLD: 1.63 K/UL (ref 1.1–3.7)
MCH RBC QN AUTO: 31.7 PG (ref 25.2–33.5)
MCHC RBC AUTO-ENTMCNC: 33.2 G/DL (ref 28.4–34.8)
MCV RBC AUTO: 95.4 FL (ref 82.6–102.9)
MONOCYTES NFR BLD: 1.08 K/UL (ref 0.1–1.2)
MONOCYTES NFR BLD: 13 % (ref 3–12)
NEUTROPHILS NFR BLD: 61 % (ref 36–65)
NEUTS SEG NFR BLD: 4.95 K/UL (ref 1.5–8.1)
NRBC BLD-RTO: 0 PER 100 WBC
PLATELET # BLD AUTO: 317 K/UL (ref 138–453)
PMV BLD AUTO: 8.9 FL (ref 8.1–13.5)
POTASSIUM SERPL-SCNC: 4.8 MMOL/L (ref 3.7–5.3)
RBC # BLD AUTO: 3.5 M/UL (ref 4.21–5.77)
SARS-COV-2 RDRP RESP QL NAA+PROBE: NOT DETECTED
SODIUM SERPL-SCNC: 138 MMOL/L (ref 135–144)
SPECIMEN DESCRIPTION: NORMAL
TROPONIN I SERPL HS-MCNC: 22 NG/L (ref 0–22)
WBC OTHER # BLD: 8.2 K/UL (ref 3.5–11.3)

## 2023-07-13 PROCEDURE — 99285 EMERGENCY DEPT VISIT HI MDM: CPT

## 2023-07-13 PROCEDURE — 84484 ASSAY OF TROPONIN QUANT: CPT

## 2023-07-13 PROCEDURE — 2580000003 HC RX 258: Performed by: EMERGENCY MEDICINE

## 2023-07-13 PROCEDURE — 93005 ELECTROCARDIOGRAM TRACING: CPT | Performed by: EMERGENCY MEDICINE

## 2023-07-13 PROCEDURE — 87635 SARS-COV-2 COVID-19 AMP PRB: CPT

## 2023-07-13 PROCEDURE — 6370000000 HC RX 637 (ALT 250 FOR IP): Performed by: EMERGENCY MEDICINE

## 2023-07-13 PROCEDURE — 72128 CT CHEST SPINE W/O DYE: CPT

## 2023-07-13 PROCEDURE — 83880 ASSAY OF NATRIURETIC PEPTIDE: CPT

## 2023-07-13 PROCEDURE — 80048 BASIC METABOLIC PNL TOTAL CA: CPT

## 2023-07-13 PROCEDURE — 72131 CT LUMBAR SPINE W/O DYE: CPT

## 2023-07-13 PROCEDURE — 85027 COMPLETE CBC AUTOMATED: CPT

## 2023-07-13 PROCEDURE — 70450 CT HEAD/BRAIN W/O DYE: CPT

## 2023-07-13 PROCEDURE — 6360000004 HC RX CONTRAST MEDICATION: Performed by: EMERGENCY MEDICINE

## 2023-07-13 PROCEDURE — 71045 X-RAY EXAM CHEST 1 VIEW: CPT

## 2023-07-13 PROCEDURE — 71260 CT THORAX DX C+: CPT

## 2023-07-13 PROCEDURE — 72125 CT NECK SPINE W/O DYE: CPT

## 2023-07-13 RX ORDER — BENZONATATE 100 MG/1
100 CAPSULE ORAL 3 TIMES DAILY PRN
Qty: 30 CAPSULE | Refills: 0 | Status: SHIPPED | OUTPATIENT
Start: 2023-07-13 | End: 2023-07-23

## 2023-07-13 RX ORDER — AZITHROMYCIN 250 MG/1
250 TABLET, FILM COATED ORAL DAILY
Qty: 4 TABLET | Refills: 0 | Status: SHIPPED | OUTPATIENT
Start: 2023-07-13 | End: 2023-07-17

## 2023-07-13 RX ORDER — BENZONATATE 100 MG/1
100 CAPSULE ORAL ONCE
Status: COMPLETED | OUTPATIENT
Start: 2023-07-13 | End: 2023-07-13

## 2023-07-13 RX ORDER — SODIUM CHLORIDE 0.9 % (FLUSH) 0.9 %
10 SYRINGE (ML) INJECTION PRN
Status: DISCONTINUED | OUTPATIENT
Start: 2023-07-13 | End: 2023-07-13 | Stop reason: HOSPADM

## 2023-07-13 RX ORDER — 0.9 % SODIUM CHLORIDE 0.9 %
80 INTRAVENOUS SOLUTION INTRAVENOUS ONCE
Status: COMPLETED | OUTPATIENT
Start: 2023-07-13 | End: 2023-07-13

## 2023-07-13 RX ORDER — AZITHROMYCIN 250 MG/1
500 TABLET, FILM COATED ORAL ONCE
Status: COMPLETED | OUTPATIENT
Start: 2023-07-13 | End: 2023-07-13

## 2023-07-13 RX ADMIN — AZITHROMYCIN DIHYDRATE 500 MG: 250 TABLET ORAL at 14:58

## 2023-07-13 RX ADMIN — SODIUM CHLORIDE 80 ML: 9 INJECTION, SOLUTION INTRAVENOUS at 13:06

## 2023-07-13 RX ADMIN — SODIUM CHLORIDE, PRESERVATIVE FREE 10 ML: 5 INJECTION INTRAVENOUS at 13:06

## 2023-07-13 RX ADMIN — IOPAMIDOL 75 ML: 755 INJECTION, SOLUTION INTRAVENOUS at 13:06

## 2023-07-13 RX ADMIN — BENZONATATE 100 MG: 100 CAPSULE ORAL at 14:58

## 2023-07-13 ASSESSMENT — ENCOUNTER SYMPTOMS
SHORTNESS OF BREATH: 1
CHEST TIGHTNESS: 0
COUGH: 1
EYE DISCHARGE: 0
ABDOMINAL DISTENTION: 0
FACIAL SWELLING: 0
ABDOMINAL PAIN: 0
BACK PAIN: 0
EYE PAIN: 0

## 2023-07-13 ASSESSMENT — PAIN DESCRIPTION - DESCRIPTORS: DESCRIPTORS: CRUSHING

## 2023-07-13 ASSESSMENT — PAIN DESCRIPTION - ORIENTATION: ORIENTATION: MID

## 2023-07-13 ASSESSMENT — PAIN - FUNCTIONAL ASSESSMENT: PAIN_FUNCTIONAL_ASSESSMENT: 0-10

## 2023-07-13 ASSESSMENT — PAIN DESCRIPTION - LOCATION: LOCATION: HEAD
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
## 2023-07-13 NOTE — ED NOTES
Pt ambulated to bathroom with cane. Pt requesting water. Pt informed once results are back, pt will be able to drink.       Olga Vallejo RN  07/13/23 6089
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
## 2023-07-13 NOTE — ED NOTES
Pt's wife called out for pt to have temp rechecked as well as possible tylenol. Pt temp is 97.9 orally. RN asked pt if he would still like RN to ask about tylenol and pt stated no.       Tani El RN  07/13/23 3499

## 2023-07-15 LAB
EKG ATRIAL RATE: 63 BPM
EKG Q-T INTERVAL: 420 MS
EKG QRS DURATION: 144 MS
EKG QTC CALCULATION (BAZETT): 436 MS
EKG R AXIS: -55 DEGREES
EKG T AXIS: 20 DEGREES
EKG VENTRICULAR RATE: 65 BPM

## 2023-07-15 PROCEDURE — 93010 ELECTROCARDIOGRAM REPORT: CPT | Performed by: INTERNAL MEDICINE

## 2023-07-17 RX ORDER — GLIPIZIDE 5 MG/1
TABLET ORAL
Qty: 60 TABLET | Refills: 0 | Status: SHIPPED | OUTPATIENT
Start: 2023-07-17

## 2023-07-17 NOTE — TELEPHONE ENCOUNTER
Current Outpatient Medications on File Prior to Visit   Medication Sig Dispense Refill    benzonatate (TESSALON) 100 MG capsule Take 1 capsule by mouth 3 times daily as needed for Cough 30 capsule 0    azithromycin (ZITHROMAX) 250 MG tablet Take 1 tablet by mouth daily for 4 days 500mg on day 1 followed by 250mg on days 2 - 5 4 tablet 0    glipiZIDE (GLUCOTROL) 5 MG tablet Take 1 tablet by mouth 2 times daily 60 tablet 0    diclofenac sodium (VOLTAREN) 1 % GEL Apply 4 g topically 4 times daily 150 g 5    metoprolol succinate (TOPROL XL) 100 MG extended release tablet TAKE 1 TABLET BY MOUTH DAILY 90 tablet 1    lisinopril (PRINIVIL;ZESTRIL) 10 MG tablet TAKE 1 TABLET BY MOUTH DAILY 90 tablet 1    pravastatin (PRAVACHOL) 80 MG tablet TAKE 1 TABLET BY MOUTH EVERY DAY 90 tablet 1    metFORMIN (GLUCOPHAGE) 1000 MG tablet Take 1 tablet by mouth 2 times daily (with meals) 180 tablet 3    ibuprofen (ADVIL;MOTRIN) 800 MG tablet Take 1 tablet by mouth every 8 hours as needed for Pain 90 tablet 1    tamsulosin (FLOMAX) 0.4 MG capsule TAKE TWO CAPSULES BY MOUTH DAILY (Patient not taking: Reported on 6/5/2023) 180 capsule 1    ondansetron (ZOFRAN ODT) 4 MG disintegrating tablet Take 1 tablet by mouth every 8 hours as needed for Nausea (Patient not taking: Reported on 3/23/2022) 20 tablet 0    metFORMIN (GLUCOPHAGE) 500 MG tablet Take 1 tablet by mouth 2 times daily (with meals). (Patient not taking: Reported on 7/29/2022) 180 tablet 1    allopurinol (ZYLOPRIM) 300 MG tablet TAKE 1 TABLET BY MOUTH DAILY (Patient not taking: Reported on 3/23/2022) 90 tablet 1    furosemide (LASIX) 20 MG tablet Take 1 tablet by mouth daily. (Patient not taking: Reported on 3/23/2022) 60 tablet 3    docusate sodium (COLACE) 100 MG capsule Take 1 capsule by mouth daily as needed for Constipation      meclizine (ANTIVERT) 25 MG tablet Take 25 mg by mouth 4 times daily.  (Patient not taking: Reported on 3/23/2022)      neomycin-polymyxin-dexameth

## 2023-07-20 ENCOUNTER — TELEMEDICINE (OUTPATIENT)
Dept: FAMILY MEDICINE CLINIC | Age: 83
End: 2023-07-20
Payer: MEDICARE
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
## 2023-07-20 DIAGNOSIS — R93.89 ABNORMAL CT OF THE CHEST: ICD-10-CM

## 2023-07-20 DIAGNOSIS — C85.90 LYMPHOMA IN REMISSION (HCC): ICD-10-CM

## 2023-07-20 PROCEDURE — G8417 CALC BMI ABV UP PARAM F/U: HCPCS | Performed by: FAMILY MEDICINE

## 2023-07-20 PROCEDURE — 1123F ACP DISCUSS/DSCN MKR DOCD: CPT | Performed by: FAMILY MEDICINE

## 2023-07-20 PROCEDURE — G8428 CUR MEDS NOT DOCUMENT: HCPCS | Performed by: FAMILY MEDICINE

## 2023-07-20 PROCEDURE — 1036F TOBACCO NON-USER: CPT | Performed by: FAMILY MEDICINE

## 2023-07-20 PROCEDURE — 99214 OFFICE O/P EST MOD 30 MIN: CPT | Performed by: FAMILY MEDICINE

## 2023-07-20 RX ORDER — LEVOFLOXACIN 750 MG/1
750 TABLET ORAL DAILY
Qty: 10 TABLET | Refills: 0 | Status: SHIPPED | OUTPATIENT
Start: 2023-07-20 | End: 2023-07-21 | Stop reason: SDUPTHER

## 2023-07-20 ASSESSMENT — PATIENT HEALTH QUESTIONNAIRE - PHQ9
2. FEELING DOWN, DEPRESSED OR HOPELESS: 0
SUM OF ALL RESPONSES TO PHQ QUESTIONS 1-9: 0
1. LITTLE INTEREST OR PLEASURE IN DOING THINGS: 0
SUM OF ALL RESPONSES TO PHQ QUESTIONS 1-9: 0
SUM OF ALL RESPONSES TO PHQ9 QUESTIONS 1 & 2: 0
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
## 2023-07-21 RX ORDER — LEVOFLOXACIN 750 MG/1
750 TABLET ORAL DAILY
Qty: 10 TABLET | Refills: 0 | Status: SHIPPED | OUTPATIENT
Start: 2023-07-21 | End: 2023-07-31

## 2023-07-21 NOTE — TELEPHONE ENCOUNTER
Garnetta Favre is calling to request a refill on the following medication(s):    Last Visit Date (If Applicable):  8/76/1959    Next Visit Date:    Visit date not found    Medication Request:  Requested Prescriptions     Pending Prescriptions Disp Refills    levoFLOXacin (LEVAQUIN) 750 MG tablet 10 tablet 0     Sig: Take 1 tablet by mouth daily for 10 days

## 2023-07-24 ENCOUNTER — APPOINTMENT (OUTPATIENT)
Dept: GENERAL RADIOLOGY | Age: 83
End: 2023-07-24
Payer: MEDICARE

## 2023-07-24 ENCOUNTER — HOSPITAL ENCOUNTER (OUTPATIENT)
Age: 83
Setting detail: OBSERVATION
Discharge: ANOTHER HOSPITAL | End: 2023-07-25
Attending: EMERGENCY MEDICINE | Admitting: INTERNAL MEDICINE
Payer: MEDICARE

## 2023-07-24 ENCOUNTER — APPOINTMENT (OUTPATIENT)
Dept: CT IMAGING | Age: 83
End: 2023-07-24
Payer: MEDICARE

## 2023-07-24 DIAGNOSIS — R94.31 NONSPECIFIC ST-T WAVE ELECTROCARDIOGRAPHIC CHANGES: ICD-10-CM

## 2023-07-24 DIAGNOSIS — I21.3 STEMI (ST ELEVATION MYOCARDIAL INFARCTION) (HCC): ICD-10-CM

## 2023-07-24 DIAGNOSIS — R07.9 CHEST PAIN, UNSPECIFIED TYPE: Primary | ICD-10-CM

## 2023-07-24 PROBLEM — R33.9 URINARY RETENTION: Status: ACTIVE | Noted: 2023-07-24

## 2023-07-24 PROBLEM — N18.9 CKD (CHRONIC KIDNEY DISEASE): Status: ACTIVE | Noted: 2023-07-24

## 2023-07-24 PROBLEM — I20.89 STABLE ANGINA: Status: ACTIVE | Noted: 2023-07-24

## 2023-07-24 PROBLEM — I20.8 STABLE ANGINA (HCC): Status: ACTIVE | Noted: 2023-07-24

## 2023-07-24 LAB
ANION GAP SERPL CALCULATED.3IONS-SCNC: 15 MMOL/L (ref 9–17)
BASOPHILS # BLD: 0.06 K/UL (ref 0–0.2)
BASOPHILS NFR BLD: 1 % (ref 0–2)
BILIRUB UR QL STRIP: NEGATIVE
BNP SERPL-MCNC: 92 PG/ML
BUN SERPL-MCNC: 32 MG/DL (ref 8–23)
BUN/CREAT SERPL: 25 (ref 9–20)
CALCIUM SERPL-MCNC: 9.6 MG/DL (ref 8.6–10.4)
CHLORIDE SERPL-SCNC: 96 MMOL/L (ref 98–107)
CLARITY UR: CLEAR
CO2 SERPL-SCNC: 21 MMOL/L (ref 20–31)
COLOR UR: YELLOW
CREAT SERPL-MCNC: 1.3 MG/DL (ref 0.7–1.2)
D DIMER PPP FEU-MCNC: 1.59 UG/ML FEU (ref 0–0.59)
EOSINOPHIL # BLD: 0.37 K/UL (ref 0–0.44)
EOSINOPHILS RELATIVE PERCENT: 4 % (ref 1–4)
ERYTHROCYTE [DISTWIDTH] IN BLOOD BY AUTOMATED COUNT: 12.6 % (ref 11.8–14.4)
GFR SERPL CREATININE-BSD FRML MDRD: 55 ML/MIN/1.73M2
GLUCOSE SERPL-MCNC: 202 MG/DL (ref 70–99)
GLUCOSE UR STRIP-MCNC: NEGATIVE MG/DL
HCT VFR BLD AUTO: 37.5 % (ref 40.7–50.3)
HGB BLD-MCNC: 12.4 G/DL (ref 13–17)
HGB UR QL STRIP.AUTO: NEGATIVE
IMM GRANULOCYTES # BLD AUTO: 0.16 K/UL (ref 0–0.3)
IMM GRANULOCYTES NFR BLD: 2 %
KETONES UR STRIP-MCNC: NEGATIVE MG/DL
LEUKOCYTE ESTERASE UR QL STRIP: NEGATIVE
LYMPHOCYTES NFR BLD: 1.54 K/UL (ref 1.1–3.7)
LYMPHOCYTES RELATIVE PERCENT: 17 % (ref 24–43)
MCH RBC QN AUTO: 31.2 PG (ref 25.2–33.5)
MCHC RBC AUTO-ENTMCNC: 33.1 G/DL (ref 28.4–34.8)
MCV RBC AUTO: 94.2 FL (ref 82.6–102.9)
METER GLUCOSE: 200 MG/DL (ref 75–110)
MONOCYTES NFR BLD: 0.76 K/UL (ref 0.1–1.2)
MONOCYTES NFR BLD: 8 % (ref 3–12)
NEUTROPHILS NFR BLD: 68 % (ref 36–65)
NEUTS SEG NFR BLD: 6.36 K/UL (ref 1.5–8.1)
NITRITE UR QL STRIP: NEGATIVE
NRBC BLD-RTO: 0 PER 100 WBC
PH UR STRIP: 5.5 [PH] (ref 5–8)
PLATELET # BLD AUTO: 397 K/UL (ref 138–453)
PMV BLD AUTO: 8.7 FL (ref 8.1–13.5)
POTASSIUM SERPL-SCNC: 4.4 MMOL/L (ref 3.7–5.3)
PROT UR STRIP-MCNC: NEGATIVE MG/DL
RBC # BLD AUTO: 3.98 M/UL (ref 4.21–5.77)
SODIUM SERPL-SCNC: 132 MMOL/L (ref 135–144)
SP GR UR STRIP: 1.01 (ref 1–1.03)
TROPONIN I SERPL HS-MCNC: 19 NG/L (ref 0–22)
TROPONIN I SERPL HS-MCNC: 19 NG/L (ref 0–22)
TROPONIN I SERPL HS-MCNC: 22 NG/L (ref 0–22)
UROBILINOGEN UR STRIP-ACNC: NORMAL EU/DL (ref 0–1)
WBC OTHER # BLD: 9.3 K/UL (ref 3.5–11.3)

## 2023-07-24 PROCEDURE — 83036 HEMOGLOBIN GLYCOSYLATED A1C: CPT

## 2023-07-24 PROCEDURE — 2580000003 HC RX 258: Performed by: EMERGENCY MEDICINE

## 2023-07-24 PROCEDURE — 6360000002 HC RX W HCPCS: Performed by: NURSE PRACTITIONER

## 2023-07-24 PROCEDURE — 85379 FIBRIN DEGRADATION QUANT: CPT

## 2023-07-24 PROCEDURE — 82947 ASSAY GLUCOSE BLOOD QUANT: CPT

## 2023-07-24 PROCEDURE — 36415 COLL VENOUS BLD VENIPUNCTURE: CPT

## 2023-07-24 PROCEDURE — 6360000004 HC RX CONTRAST MEDICATION: Performed by: EMERGENCY MEDICINE

## 2023-07-24 PROCEDURE — 81003 URINALYSIS AUTO W/O SCOPE: CPT

## 2023-07-24 PROCEDURE — 71260 CT THORAX DX C+: CPT

## 2023-07-24 PROCEDURE — 51798 US URINE CAPACITY MEASURE: CPT

## 2023-07-24 PROCEDURE — 84484 ASSAY OF TROPONIN QUANT: CPT

## 2023-07-24 PROCEDURE — 6370000000 HC RX 637 (ALT 250 FOR IP): Performed by: NURSE PRACTITIONER

## 2023-07-24 PROCEDURE — 71046 X-RAY EXAM CHEST 2 VIEWS: CPT

## 2023-07-24 PROCEDURE — G0378 HOSPITAL OBSERVATION PER HR: HCPCS

## 2023-07-24 PROCEDURE — 93005 ELECTROCARDIOGRAM TRACING: CPT | Performed by: EMERGENCY MEDICINE

## 2023-07-24 PROCEDURE — 80048 BASIC METABOLIC PNL TOTAL CA: CPT

## 2023-07-24 PROCEDURE — 85027 COMPLETE CBC AUTOMATED: CPT

## 2023-07-24 PROCEDURE — 99223 1ST HOSP IP/OBS HIGH 75: CPT | Performed by: NURSE PRACTITIONER

## 2023-07-24 PROCEDURE — 2580000003 HC RX 258: Performed by: NURSE PRACTITIONER

## 2023-07-24 PROCEDURE — 99285 EMERGENCY DEPT VISIT HI MDM: CPT

## 2023-07-24 PROCEDURE — 96374 THER/PROPH/DIAG INJ IV PUSH: CPT

## 2023-07-24 PROCEDURE — 96361 HYDRATE IV INFUSION ADD-ON: CPT

## 2023-07-24 PROCEDURE — 83880 ASSAY OF NATRIURETIC PEPTIDE: CPT

## 2023-07-24 RX ORDER — HEPARIN SODIUM 5000 [USP'U]/ML
5000 INJECTION, SOLUTION INTRAVENOUS; SUBCUTANEOUS EVERY 8 HOURS SCHEDULED
Status: DISCONTINUED | OUTPATIENT
Start: 2023-07-24 | End: 2023-07-25

## 2023-07-24 RX ORDER — SODIUM CHLORIDE 9 MG/ML
INJECTION, SOLUTION INTRAVENOUS PRN
Status: DISCONTINUED | OUTPATIENT
Start: 2023-07-24 | End: 2023-07-25 | Stop reason: HOSPADM

## 2023-07-24 RX ORDER — ACETAMINOPHEN 650 MG/1
650 SUPPOSITORY RECTAL EVERY 6 HOURS PRN
Status: DISCONTINUED | OUTPATIENT
Start: 2023-07-24 | End: 2023-07-25 | Stop reason: HOSPADM

## 2023-07-24 RX ORDER — ASPIRIN 81 MG/1
324 TABLET, CHEWABLE ORAL ONCE
Status: DISCONTINUED | OUTPATIENT
Start: 2023-07-24 | End: 2023-07-24

## 2023-07-24 RX ORDER — HYDRALAZINE HYDROCHLORIDE 20 MG/ML
5 INJECTION INTRAMUSCULAR; INTRAVENOUS EVERY 6 HOURS PRN
Status: DISCONTINUED | OUTPATIENT
Start: 2023-07-24 | End: 2023-07-25 | Stop reason: HOSPADM

## 2023-07-24 RX ORDER — SODIUM CHLORIDE 0.9 % (FLUSH) 0.9 %
10 SYRINGE (ML) INJECTION PRN
Status: DISCONTINUED | OUTPATIENT
Start: 2023-07-24 | End: 2023-07-25 | Stop reason: HOSPADM

## 2023-07-24 RX ORDER — SODIUM CHLORIDE 0.9 % (FLUSH) 0.9 %
5-40 SYRINGE (ML) INJECTION EVERY 12 HOURS SCHEDULED
Status: DISCONTINUED | OUTPATIENT
Start: 2023-07-24 | End: 2023-07-25 | Stop reason: HOSPADM

## 2023-07-24 RX ORDER — ONDANSETRON 4 MG/1
4 TABLET, ORALLY DISINTEGRATING ORAL EVERY 8 HOURS PRN
Status: DISCONTINUED | OUTPATIENT
Start: 2023-07-24 | End: 2023-07-25 | Stop reason: HOSPADM

## 2023-07-24 RX ORDER — POTASSIUM CHLORIDE 7.45 MG/ML
10 INJECTION INTRAVENOUS PRN
Status: DISCONTINUED | OUTPATIENT
Start: 2023-07-24 | End: 2023-07-24 | Stop reason: SDUPTHER

## 2023-07-24 RX ORDER — ACETAMINOPHEN 325 MG/1
650 TABLET ORAL EVERY 6 HOURS PRN
Status: DISCONTINUED | OUTPATIENT
Start: 2023-07-24 | End: 2023-07-25 | Stop reason: HOSPADM

## 2023-07-24 RX ORDER — TAMSULOSIN HYDROCHLORIDE 0.4 MG/1
0.4 CAPSULE ORAL DAILY
Status: DISCONTINUED | OUTPATIENT
Start: 2023-07-24 | End: 2023-07-25 | Stop reason: HOSPADM

## 2023-07-24 RX ORDER — 0.9 % SODIUM CHLORIDE 0.9 %
1000 INTRAVENOUS SOLUTION INTRAVENOUS ONCE
Status: COMPLETED | OUTPATIENT
Start: 2023-07-24 | End: 2023-07-24

## 2023-07-24 RX ORDER — ASPIRIN 325 MG
325 TABLET ORAL DAILY
Status: DISCONTINUED | OUTPATIENT
Start: 2023-07-25 | End: 2023-07-25

## 2023-07-24 RX ORDER — DEXTROSE MONOHYDRATE 100 MG/ML
INJECTION, SOLUTION INTRAVENOUS CONTINUOUS PRN
Status: DISCONTINUED | OUTPATIENT
Start: 2023-07-24 | End: 2023-07-25 | Stop reason: HOSPADM

## 2023-07-24 RX ORDER — MAGNESIUM SULFATE 1 G/100ML
1000 INJECTION INTRAVENOUS PRN
Status: DISCONTINUED | OUTPATIENT
Start: 2023-07-24 | End: 2023-07-25 | Stop reason: HOSPADM

## 2023-07-24 RX ORDER — NITROGLYCERIN 0.4 MG/1
0.4 TABLET SUBLINGUAL EVERY 5 MIN PRN
Status: DISCONTINUED | OUTPATIENT
Start: 2023-07-24 | End: 2023-07-25 | Stop reason: HOSPADM

## 2023-07-24 RX ORDER — SODIUM CHLORIDE 9 MG/ML
INJECTION, SOLUTION INTRAVENOUS CONTINUOUS
Status: DISCONTINUED | OUTPATIENT
Start: 2023-07-24 | End: 2023-07-25 | Stop reason: HOSPADM

## 2023-07-24 RX ORDER — PRAVASTATIN SODIUM 40 MG
80 TABLET ORAL NIGHTLY
Status: DISCONTINUED | OUTPATIENT
Start: 2023-07-24 | End: 2023-07-25 | Stop reason: HOSPADM

## 2023-07-24 RX ORDER — POTASSIUM CHLORIDE 20 MEQ/1
40 TABLET, EXTENDED RELEASE ORAL PRN
Status: DISCONTINUED | OUTPATIENT
Start: 2023-07-24 | End: 2023-07-25 | Stop reason: HOSPADM

## 2023-07-24 RX ORDER — LANOLIN ALCOHOL/MO/W.PET/CERES
9 CREAM (GRAM) TOPICAL NIGHTLY PRN
Status: DISCONTINUED | OUTPATIENT
Start: 2023-07-24 | End: 2023-07-25 | Stop reason: HOSPADM

## 2023-07-24 RX ORDER — SODIUM CHLORIDE 0.9 % (FLUSH) 0.9 %
10 SYRINGE (ML) INJECTION PRN
Status: DISCONTINUED | OUTPATIENT
Start: 2023-07-24 | End: 2023-07-24 | Stop reason: ALTCHOICE

## 2023-07-24 RX ORDER — 0.9 % SODIUM CHLORIDE 0.9 %
80 INTRAVENOUS SOLUTION INTRAVENOUS ONCE
Status: DISCONTINUED | OUTPATIENT
Start: 2023-07-24 | End: 2023-07-24 | Stop reason: ALTCHOICE

## 2023-07-24 RX ORDER — POTASSIUM CHLORIDE 7.45 MG/ML
10 INJECTION INTRAVENOUS PRN
Status: DISCONTINUED | OUTPATIENT
Start: 2023-07-24 | End: 2023-07-25 | Stop reason: HOSPADM

## 2023-07-24 RX ORDER — METOPROLOL SUCCINATE 50 MG/1
100 TABLET, EXTENDED RELEASE ORAL DAILY
Status: DISCONTINUED | OUTPATIENT
Start: 2023-07-24 | End: 2023-07-25 | Stop reason: HOSPADM

## 2023-07-24 RX ORDER — INSULIN LISPRO 100 [IU]/ML
0-4 INJECTION, SOLUTION INTRAVENOUS; SUBCUTANEOUS
Status: DISCONTINUED | OUTPATIENT
Start: 2023-07-24 | End: 2023-07-25 | Stop reason: HOSPADM

## 2023-07-24 RX ORDER — INSULIN LISPRO 100 [IU]/ML
0-4 INJECTION, SOLUTION INTRAVENOUS; SUBCUTANEOUS NIGHTLY
Status: DISCONTINUED | OUTPATIENT
Start: 2023-07-24 | End: 2023-07-25 | Stop reason: HOSPADM

## 2023-07-24 RX ORDER — ONDANSETRON 2 MG/ML
4 INJECTION INTRAMUSCULAR; INTRAVENOUS EVERY 6 HOURS PRN
Status: DISCONTINUED | OUTPATIENT
Start: 2023-07-24 | End: 2023-07-25 | Stop reason: HOSPADM

## 2023-07-24 RX ORDER — GLIPIZIDE 5 MG/1
5 TABLET ORAL 2 TIMES DAILY
Status: DISCONTINUED | OUTPATIENT
Start: 2023-07-25 | End: 2023-07-25 | Stop reason: HOSPADM

## 2023-07-24 RX ADMIN — HYDRALAZINE HYDROCHLORIDE 5 MG: 20 INJECTION INTRAMUSCULAR; INTRAVENOUS at 20:33

## 2023-07-24 RX ADMIN — ACETAMINOPHEN 650 MG: 325 TABLET ORAL at 20:33

## 2023-07-24 RX ADMIN — TAMSULOSIN HYDROCHLORIDE 0.4 MG: 0.4 CAPSULE ORAL at 23:16

## 2023-07-24 RX ADMIN — SODIUM CHLORIDE: 9 INJECTION, SOLUTION INTRAVENOUS at 20:21

## 2023-07-24 RX ADMIN — IOPAMIDOL 75 ML: 755 INJECTION, SOLUTION INTRAVENOUS at 14:34

## 2023-07-24 RX ADMIN — Medication 9 MG: at 23:16

## 2023-07-24 RX ADMIN — SODIUM CHLORIDE, PRESERVATIVE FREE 10 ML: 5 INJECTION INTRAVENOUS at 14:34

## 2023-07-24 RX ADMIN — Medication 80 ML: at 14:34

## 2023-07-24 RX ADMIN — SODIUM CHLORIDE 1000 ML: 9 INJECTION, SOLUTION INTRAVENOUS at 15:10

## 2023-07-24 ASSESSMENT — PAIN SCALES - GENERAL
PAINLEVEL_OUTOF10: 3
PAINLEVEL_OUTOF10: 3

## 2023-07-24 ASSESSMENT — PAIN DESCRIPTION - LOCATION
LOCATION: CHEST
LOCATION: HEAD

## 2023-07-24 ASSESSMENT — PAIN - FUNCTIONAL ASSESSMENT
PAIN_FUNCTIONAL_ASSESSMENT: 0-10
PAIN_FUNCTIONAL_ASSESSMENT: ACTIVITIES ARE NOT PREVENTED

## 2023-07-24 ASSESSMENT — PAIN DESCRIPTION - DESCRIPTORS
DESCRIPTORS: HEAVINESS
DESCRIPTORS: PRESSURE

## 2023-07-24 ASSESSMENT — PAIN DESCRIPTION - ORIENTATION
ORIENTATION: MID
ORIENTATION: MID

## 2023-07-24 NOTE — ED NOTES
Bladder scan completed - max of 398mL noted on scan. Dr. Keon Dong informed. Pt states he has enlarged prostate. Is prescribed flomax, but does not take it because it causes impotence.      Kinga Esparza RN  07/24/23 6439
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
## 2023-07-24 NOTE — ED PROVIDER NOTES
[address]   Emergency Department Encounter  Emergency Medicine Physician Note     Pt Name:Greg Nunes  MRN: [id number]  [address] [facility] 1940  Date of evaluation: 7/24/23  PCP:  Yoanna Garcia DO  Note Started: 11:45 AM EDT      CHIEF COMPLAINT       Chief Complaint   Patient presents with    Chest Pain     Pt reports midsternal chest pressure with nausea. Last took zofran around 1030 this morning. Dizziness       HISTORY OF PRESENT ILLNESS  (Location/Symptom, Timing/Onset, Context/Setting, Quality, Duration, Modifying Factors, Severity.)      Lalita Mosqueda is a 80 y.o. male who presents with weakness, midsternal chest pressure. Nausea. Did take family Zofran with some improvement of nausea. Patient also reports some dizziness. Patient also reports progressive weakness over the last couple days. He has had these aches and his heart periodically over the last couple months. Patient was in [facility] a couple weeks ago, was evaluated here with extensive work-up. Patient was diagnosed with pneumonia was given a Z-Pedro Luis then transition to [place]. Levaquin was given by PCP. Patient denies any shortness of breath. Patient denies any change in urination or bowel habits. Patient denies any abdominal pain. PAST MEDICAL / SURGICAL / SOCIAL / FAMILY HISTORY      has a past medical history of Anxiety, CAD (coronary artery disease), GERD (gastroesophageal reflux disease), Gout, Hyperlipidemia, Hypertension, Lymphoma in remission ([address]), and Type II or unspecified type diabetes mellitus without mention of complication, not stated as uncontrolled. No additional pertinent        has a past surgical history that includes Appendectomy (age 25); Coronary angioplasty with stent; Colonoscopy; and back surgery.   No additional pertinent       Social History     Socioeconomic History    Marital status:      Spouse name: Not on file    Number of children: Not on file    Years of education: Not on file    Highest

## 2023-07-24 NOTE — ED NOTES
IV fluids administered as ordered and documented. Pt resting on stretcher with family at bedside. Continuous monitoring in place. Awaiting CT results prior to update on plan of care.      Dre Mario RN  07/24/23 6929

## 2023-07-24 NOTE — ED NOTES
Pt does not want covid testing completed. Family states he was tested approx one week ago and was negative.      Marcia Canchola RN  07/24/23 5724

## 2023-07-24 NOTE — ED NOTES
Pt independently ambulatory to bathroom to provide urine specimen.      Gagandeep Posadas RN  07/24/23 5133

## 2023-07-25 ENCOUNTER — APPOINTMENT (OUTPATIENT)
Dept: GENERAL RADIOLOGY | Age: 83
End: 2023-07-25
Payer: MEDICARE

## 2023-07-25 ENCOUNTER — APPOINTMENT (OUTPATIENT)
Dept: VASCULAR LAB | Age: 83
DRG: 235 | End: 2023-07-25
Attending: THORACIC SURGERY (CARDIOTHORACIC VASCULAR SURGERY)
Payer: MEDICARE

## 2023-07-25 ENCOUNTER — APPOINTMENT (OUTPATIENT)
Dept: GENERAL RADIOLOGY | Age: 83
DRG: 235 | End: 2023-07-25
Attending: INTERNAL MEDICINE
Payer: MEDICARE

## 2023-07-25 ENCOUNTER — HOSPITAL ENCOUNTER (INPATIENT)
Age: 83
LOS: 12 days | Discharge: NURSING, REHAB OR LONG-TERM CARE | DRG: 235 | End: 2023-08-06
Attending: INTERNAL MEDICINE | Admitting: INTERNAL MEDICINE
Payer: MEDICARE

## 2023-07-25 ENCOUNTER — APPOINTMENT (OUTPATIENT)
Age: 83
DRG: 235 | End: 2023-07-25
Attending: THORACIC SURGERY (CARDIOTHORACIC VASCULAR SURGERY)
Payer: MEDICARE

## 2023-07-25 VITALS
OXYGEN SATURATION: 94 % | HEIGHT: 69 IN | BODY MASS INDEX: 22.74 KG/M2 | SYSTOLIC BLOOD PRESSURE: 95 MMHG | TEMPERATURE: 97 F | DIASTOLIC BLOOD PRESSURE: 65 MMHG | HEART RATE: 74 BPM | WEIGHT: 153.5 LBS | RESPIRATION RATE: 19 BRPM

## 2023-07-25 DIAGNOSIS — Z95.1 S/P CABG X 3: ICD-10-CM

## 2023-07-25 DIAGNOSIS — I25.10 CAD, MULTIPLE VESSEL: ICD-10-CM

## 2023-07-25 DIAGNOSIS — I21.09 ST ELEVATION MYOCARDIAL INFARCTION (STEMI) INVOLVING OTHER CORONARY ARTERY OF ANTERIOR WALL (HCC): ICD-10-CM

## 2023-07-25 DIAGNOSIS — R00.1 BRADYCARDIA: ICD-10-CM

## 2023-07-25 DIAGNOSIS — G89.18 POST-OP PAIN: ICD-10-CM

## 2023-07-25 DIAGNOSIS — I25.10 MULTIPLE VESSEL CORONARY ARTERY DISEASE: Primary | ICD-10-CM

## 2023-07-25 PROBLEM — I95.9 HYPOTENSION: Status: ACTIVE | Noted: 2023-07-25

## 2023-07-25 PROBLEM — R07.9 CHEST PAIN: Status: ACTIVE | Noted: 2023-07-25

## 2023-07-25 PROBLEM — R55 SYNCOPE: Status: ACTIVE | Noted: 2023-07-25

## 2023-07-25 PROBLEM — I21.9 HEART ATTACK (HCC): Status: ACTIVE | Noted: 2023-07-25

## 2023-07-25 PROBLEM — R07.89 OTHER CHEST PAIN: Status: ACTIVE | Noted: 2023-07-25

## 2023-07-25 PROBLEM — R94.31 NONSPECIFIC ST-T WAVE ELECTROCARDIOGRAPHIC CHANGES: Status: ACTIVE | Noted: 2023-07-25

## 2023-07-25 LAB
ALBUMIN SERPL-MCNC: 3.6 G/DL (ref 3.5–5.2)
ALBUMIN/GLOB SERPL: 1.4 {RATIO} (ref 1–2.5)
ALP SERPL-CCNC: 81 U/L (ref 40–129)
ALT SERPL-CCNC: 11 U/L (ref 5–41)
ANION GAP SERPL CALCULATED.3IONS-SCNC: 13 MMOL/L (ref 9–17)
ANION GAP SERPL CALCULATED.3IONS-SCNC: 18 MMOL/L (ref 9–17)
ANTI-XA UNFRAC HEPARIN: 0.26 IU/L
ANTI-XA UNFRAC HEPARIN: 0.34 IU/L
ANTI-XA UNFRAC HEPARIN: <0.1 IU/L (ref 0.3–0.7)
AST SERPL-CCNC: 8 U/L
BILIRUB SERPL-MCNC: 0.3 MG/DL (ref 0.3–1.2)
BUN SERPL-MCNC: 27 MG/DL (ref 8–23)
BUN SERPL-MCNC: 29 MG/DL (ref 8–23)
BUN/CREAT SERPL: 21 (ref 9–20)
CALCIUM SERPL-MCNC: 9.1 MG/DL (ref 8.6–10.4)
CALCIUM SERPL-MCNC: 9.3 MG/DL (ref 8.6–10.4)
CHLORIDE SERPL-SCNC: 104 MMOL/L (ref 98–107)
CHLORIDE SERPL-SCNC: 99 MMOL/L (ref 98–107)
CO2 SERPL-SCNC: 20 MMOL/L (ref 20–31)
CO2 SERPL-SCNC: 20 MMOL/L (ref 20–31)
CREAT SERPL-MCNC: 1.3 MG/DL (ref 0.7–1.2)
CREAT SERPL-MCNC: 1.4 MG/DL (ref 0.7–1.2)
ECHO BSA: 1.84 M2
ERYTHROCYTE [DISTWIDTH] IN BLOOD BY AUTOMATED COUNT: 12.9 % (ref 11.8–14.4)
ERYTHROCYTE [DISTWIDTH] IN BLOOD BY AUTOMATED COUNT: 12.9 % (ref 11.8–14.4)
EST. AVERAGE GLUCOSE BLD GHB EST-MCNC: 140 MG/DL
EST. AVERAGE GLUCOSE BLD GHB EST-MCNC: 143 MG/DL
GFR SERPL CREATININE-BSD FRML MDRD: 50 ML/MIN/1.73M2
GFR SERPL CREATININE-BSD FRML MDRD: 55 ML/MIN/1.73M2
GLUCOSE BLD-MCNC: 112 MG/DL (ref 74–100)
GLUCOSE SERPL-MCNC: 124 MG/DL (ref 70–99)
GLUCOSE SERPL-MCNC: 137 MG/DL (ref 70–99)
HBA1C MFR BLD: 6.5 % (ref 4–6)
HBA1C MFR BLD: 6.6 % (ref 4–6)
HCT VFR BLD AUTO: 23.9 % (ref 40.7–50.3)
HCT VFR BLD AUTO: 30.3 % (ref 40.7–50.3)
HCT VFR BLD AUTO: 32.7 % (ref 40.7–50.3)
HGB BLD-MCNC: 10.1 G/DL (ref 13–17)
HGB BLD-MCNC: 10.9 G/DL (ref 13–17)
HGB BLD-MCNC: 7.9 G/DL (ref 13–17)
INHIBITION AA TEG: 51.6 % (ref 0–11)
INHIBITION AA TEG: 58.7 % (ref 0–11)
INHIBITION ADP TEG: 2.5 % (ref 0–17)
INHIBITION ADP TEG: 4.9 % (ref 0–17)
INR PPP: 1.1
INR PPP: 1.1
MA (MAX CLOT) TEG KAOLIN: 69.5 MM (ref 53–68)
MA (MAX CLOT) TEG KAOLIN: 71 MM (ref 53–68)
MA(AA) TEG: 42.4 MM (ref 51–71)
MA(AA) TEG: 45.2 MM (ref 51–71)
MA(ACTIVATED) TEG: 22.3 MM (ref 2–19)
MA(ACTIVATED) TEG: 22.4 MM (ref 2–19)
MA(ADP) TEG: 68.3 MM (ref 45–69)
MA(ADP) TEG: 68.6 MM (ref 45–69)
MAGNESIUM SERPL-MCNC: 1.9 MG/DL (ref 1.6–2.6)
MCH RBC QN AUTO: 31.1 PG (ref 25.2–33.5)
MCH RBC QN AUTO: 31.5 PG (ref 25.2–33.5)
MCHC RBC AUTO-ENTMCNC: 33.3 G/DL (ref 28.4–34.8)
MCHC RBC AUTO-ENTMCNC: 33.3 G/DL (ref 28.4–34.8)
MCV RBC AUTO: 93.2 FL (ref 82.6–102.9)
MCV RBC AUTO: 94.5 FL (ref 82.6–102.9)
METER GLUCOSE: 108 MG/DL (ref 75–110)
METER GLUCOSE: 152 MG/DL (ref 75–110)
MICROORGANISM SPEC CULT: NORMAL
MYOGLOBIN SERPL-MCNC: 59 NG/ML (ref 28–72)
NEGATIVE BASE EXCESS, ART: 3.1 MMOL/L (ref 0–2)
NRBC BLD-RTO: 0 PER 100 WBC
NRBC BLD-RTO: 0 PER 100 WBC
O2 DELIVERY DEVICE: ABNORMAL
PARTIAL THROMBOPLASTIN TIME: 23 SEC (ref 23.9–33.8)
PARTIAL THROMBOPLASTIN TIME: 41 SEC (ref 23–36.5)
PERFORMING LOCATION: ABNORMAL
PLATELET # BLD AUTO: 338 K/UL (ref 138–453)
PLATELET # BLD AUTO: 355 K/UL (ref 138–453)
PMV BLD AUTO: 8.9 FL (ref 8.1–13.5)
PMV BLD AUTO: 9 FL (ref 8.1–13.5)
POC HCO3: 20.5 MMOL/L (ref 21–28)
POC O2 SATURATION: 97.4 % (ref 94–98)
POC PCO2: 31.3 MM HG (ref 35–48)
POC PH: 7.42 (ref 7.35–7.45)
POC PO2: 90.8 MM HG (ref 83–108)
POTASSIUM SERPL-SCNC: 3.9 MMOL/L (ref 3.7–5.3)
POTASSIUM SERPL-SCNC: 4 MMOL/L (ref 3.7–5.3)
PREALB SERPL-MCNC: 26.7 MG/DL (ref 20–40)
PROT SERPL-MCNC: 6.2 G/DL (ref 6.4–8.3)
PROTHROMBIN TIME: 13.9 SEC (ref 11.5–14.2)
PROTHROMBIN TIME: 13.9 SEC (ref 11.7–14.9)
RBC # BLD AUTO: 3.25 M/UL (ref 4.21–5.77)
RBC # BLD AUTO: 3.46 M/UL (ref 4.21–5.77)
REASON FOR REJECTION: NORMAL
SODIUM SERPL-SCNC: 137 MMOL/L (ref 135–144)
SODIUM SERPL-SCNC: 137 MMOL/L (ref 135–144)
SPECIMEN DESCRIPTION: NORMAL
SPECIMEN SOURCE: NORMAL
TROPONIN I SERPL HS-MCNC: 27 NG/L (ref 0–22)
VAS LEFT BULB EDV: 17.3 CM/S
VAS LEFT BULB PSV: 86.6 CM/S
VAS LEFT CCA DIST EDV: 40.7 CM/S
VAS LEFT CCA DIST PSV: 145 CM/S
VAS LEFT CCA MID EDV: 26.1 CM/S
VAS LEFT CCA MID PSV: 106 CM/S
VAS LEFT CCA PROX EDV: 26.1 CM/S
VAS LEFT CCA PROX PSV: 110 CM/S
VAS LEFT ECA EDV: 28.5 CM/S
VAS LEFT ECA PSV: 169 CM/S
VAS LEFT ICA DIST EDV: 96.6 CM/S
VAS LEFT ICA DIST PSV: 233 CM/S
VAS LEFT ICA MID EDV: 79 CM/S
VAS LEFT ICA MID PSV: 226 CM/S
VAS LEFT ICA PROX EDV: 62.6 CM/S
VAS LEFT ICA PROX PSV: 213 CM/S
VAS LEFT VERTEBRAL EDV: 26.7 CM/S
VAS LEFT VERTEBRAL PSV: 90.9 CM/S
VAS RIGHT BULB EDV: 18.6 CM/S
VAS RIGHT BULB PSV: 92.6 CM/S
VAS RIGHT CCA DIST EDV: 24.2 CM/S
VAS RIGHT CCA DIST PSV: 75.8 CM/S
VAS RIGHT CCA MID EDV: 24.9 CM/S
VAS RIGHT CCA MID PSV: 77.7 CM/S
VAS RIGHT CCA PROX EDV: 16.2 CM/S
VAS RIGHT CCA PROX PSV: 82 CM/S
VAS RIGHT ECA EDV: 9.94 CM/S
VAS RIGHT ECA PSV: 95.7 CM/S
VAS RIGHT ICA DIST EDV: 21.1 CM/S
VAS RIGHT ICA DIST PSV: 56.5 CM/S
VAS RIGHT ICA MID EDV: 23 CM/S
VAS RIGHT ICA MID PSV: 62.7 CM/S
VAS RIGHT ICA PROX EDV: 19.9 CM/S
VAS RIGHT ICA PROX PSV: 108 CM/S
VAS RIGHT VERTEBRAL EDV: 11.2 CM/S
VAS RIGHT VERTEBRAL PSV: 29.2 CM/S
WBC OTHER # BLD: 9 K/UL (ref 3.5–11.3)
WBC OTHER # BLD: 9.5 K/UL (ref 3.5–11.3)
ZZ NTE CLEAN UP: ORDERED TEST: NORMAL

## 2023-07-25 PROCEDURE — 85610 PROTHROMBIN TIME: CPT

## 2023-07-25 PROCEDURE — 99291 CRITICAL CARE FIRST HOUR: CPT | Performed by: INTERNAL MEDICINE

## 2023-07-25 PROCEDURE — 85730 THROMBOPLASTIN TIME PARTIAL: CPT

## 2023-07-25 PROCEDURE — 36415 COLL VENOUS BLD VENIPUNCTURE: CPT

## 2023-07-25 PROCEDURE — 87641 MR-STAPH DNA AMP PROBE: CPT

## 2023-07-25 PROCEDURE — 93880 EXTRACRANIAL BILAT STUDY: CPT

## 2023-07-25 PROCEDURE — 93306 TTE W/DOPPLER COMPLETE: CPT | Performed by: INTERNAL MEDICINE

## 2023-07-25 PROCEDURE — 87205 SMEAR GRAM STAIN: CPT

## 2023-07-25 PROCEDURE — C1894 INTRO/SHEATH, NON-LASER: HCPCS | Performed by: INTERNAL MEDICINE

## 2023-07-25 PROCEDURE — 85027 COMPLETE CBC AUTOMATED: CPT

## 2023-07-25 PROCEDURE — 80048 BASIC METABOLIC PNL TOTAL CA: CPT

## 2023-07-25 PROCEDURE — 2580000003 HC RX 258: Performed by: NURSE PRACTITIONER

## 2023-07-25 PROCEDURE — 85520 HEPARIN ASSAY: CPT

## 2023-07-25 PROCEDURE — 82947 ASSAY GLUCOSE BLOOD QUANT: CPT

## 2023-07-25 PROCEDURE — C1889 IMPLANT/INSERT DEVICE, NOC: HCPCS | Performed by: INTERNAL MEDICINE

## 2023-07-25 PROCEDURE — 6360000002 HC RX W HCPCS: Performed by: NURSE PRACTITIONER

## 2023-07-25 PROCEDURE — 2500000003 HC RX 250 WO HCPCS: Performed by: NURSE PRACTITIONER

## 2023-07-25 PROCEDURE — 6370000000 HC RX 637 (ALT 250 FOR IP): Performed by: PHYSICIAN ASSISTANT

## 2023-07-25 PROCEDURE — 84484 ASSAY OF TROPONIN QUANT: CPT

## 2023-07-25 PROCEDURE — G0378 HOSPITAL OBSERVATION PER HR: HCPCS

## 2023-07-25 PROCEDURE — 93308 TTE F-UP OR LMTD: CPT

## 2023-07-25 PROCEDURE — 96375 TX/PRO/DX INJ NEW DRUG ADDON: CPT

## 2023-07-25 PROCEDURE — 93454 CORONARY ARTERY ANGIO S&I: CPT | Performed by: INTERNAL MEDICINE

## 2023-07-25 PROCEDURE — 93005 ELECTROCARDIOGRAM TRACING: CPT | Performed by: PHYSICIAN ASSISTANT

## 2023-07-25 PROCEDURE — 83036 HEMOGLOBIN GLYCOSYLATED A1C: CPT

## 2023-07-25 PROCEDURE — 6360000002 HC RX W HCPCS: Performed by: STUDENT IN AN ORGANIZED HEALTH CARE EDUCATION/TRAINING PROGRAM

## 2023-07-25 PROCEDURE — 83735 ASSAY OF MAGNESIUM: CPT

## 2023-07-25 PROCEDURE — 99233 SBSQ HOSP IP/OBS HIGH 50: CPT | Performed by: STUDENT IN AN ORGANIZED HEALTH CARE EDUCATION/TRAINING PROGRAM

## 2023-07-25 PROCEDURE — 71045 X-RAY EXAM CHEST 1 VIEW: CPT

## 2023-07-25 PROCEDURE — 93306 TTE W/DOPPLER COMPLETE: CPT

## 2023-07-25 PROCEDURE — 6360000002 HC RX W HCPCS: Performed by: INTERNAL MEDICINE

## 2023-07-25 PROCEDURE — 93970 EXTREMITY STUDY: CPT

## 2023-07-25 PROCEDURE — 2580000003 HC RX 258: Performed by: INTERNAL MEDICINE

## 2023-07-25 PROCEDURE — 6360000002 HC RX W HCPCS

## 2023-07-25 PROCEDURE — 2709999900 HC NON-CHARGEABLE SUPPLY: Performed by: INTERNAL MEDICINE

## 2023-07-25 PROCEDURE — 6370000000 HC RX 637 (ALT 250 FOR IP): Performed by: NURSE PRACTITIONER

## 2023-07-25 PROCEDURE — 33970 AORTIC CIRCULATION ASSIST: CPT | Performed by: INTERNAL MEDICINE

## 2023-07-25 PROCEDURE — 6360000004 HC RX CONTRAST MEDICATION: Performed by: INTERNAL MEDICINE

## 2023-07-25 PROCEDURE — 51702 INSERT TEMP BLADDER CATH: CPT

## 2023-07-25 PROCEDURE — 93005 ELECTROCARDIOGRAM TRACING: CPT | Performed by: STUDENT IN AN ORGANIZED HEALTH CARE EDUCATION/TRAINING PROGRAM

## 2023-07-25 PROCEDURE — 6360000004 HC RX CONTRAST MEDICATION

## 2023-07-25 PROCEDURE — 85014 HEMATOCRIT: CPT

## 2023-07-25 PROCEDURE — 85576 BLOOD PLATELET AGGREGATION: CPT

## 2023-07-25 PROCEDURE — 84134 ASSAY OF PREALBUMIN: CPT

## 2023-07-25 PROCEDURE — 80053 COMPREHEN METABOLIC PANEL: CPT

## 2023-07-25 PROCEDURE — 2000000000 HC ICU R&B

## 2023-07-25 PROCEDURE — 82803 BLOOD GASES ANY COMBINATION: CPT

## 2023-07-25 PROCEDURE — C1892 INTRO/SHEATH,FIXED,PEEL-AWAY: HCPCS | Performed by: INTERNAL MEDICINE

## 2023-07-25 PROCEDURE — 6370000000 HC RX 637 (ALT 250 FOR IP): Performed by: INTERNAL MEDICINE

## 2023-07-25 PROCEDURE — 83874 ASSAY OF MYOGLOBIN: CPT

## 2023-07-25 PROCEDURE — 85018 HEMOGLOBIN: CPT

## 2023-07-25 PROCEDURE — 6370000000 HC RX 637 (ALT 250 FOR IP): Performed by: STUDENT IN AN ORGANIZED HEALTH CARE EDUCATION/TRAINING PROGRAM

## 2023-07-25 RX ORDER — ACETAMINOPHEN 500 MG
1000 TABLET ORAL ONCE
Status: COMPLETED | OUTPATIENT
Start: 2023-07-25 | End: 2023-07-25

## 2023-07-25 RX ORDER — HEPARIN SODIUM 10000 [USP'U]/100ML
5-30 INJECTION, SOLUTION INTRAVENOUS CONTINUOUS
Status: DISCONTINUED | OUTPATIENT
Start: 2023-07-25 | End: 2023-07-25 | Stop reason: HOSPADM

## 2023-07-25 RX ORDER — POLYETHYLENE GLYCOL 3350 17 G/17G
17 POWDER, FOR SOLUTION ORAL DAILY PRN
Status: DISCONTINUED | OUTPATIENT
Start: 2023-07-25 | End: 2023-07-26

## 2023-07-25 RX ORDER — POTASSIUM CHLORIDE 20 MEQ/1
20 TABLET, EXTENDED RELEASE ORAL ONCE
Status: COMPLETED | OUTPATIENT
Start: 2023-07-25 | End: 2023-07-25

## 2023-07-25 RX ORDER — HEPARIN SODIUM 1000 [USP'U]/ML
4000 INJECTION, SOLUTION INTRAVENOUS; SUBCUTANEOUS ONCE
Status: DISCONTINUED | OUTPATIENT
Start: 2023-07-25 | End: 2023-07-26

## 2023-07-25 RX ORDER — HEPARIN SODIUM 10000 [USP'U]/100ML
INJECTION, SOLUTION INTRAVENOUS CONTINUOUS PRN
Status: COMPLETED | OUTPATIENT
Start: 2023-07-25 | End: 2023-07-25

## 2023-07-25 RX ORDER — HEPARIN SODIUM 1000 [USP'U]/ML
4000 INJECTION, SOLUTION INTRAVENOUS; SUBCUTANEOUS PRN
Status: DISCONTINUED | OUTPATIENT
Start: 2023-07-25 | End: 2023-07-26

## 2023-07-25 RX ORDER — MAGNESIUM SULFATE 1 G/100ML
1000 INJECTION INTRAVENOUS PRN
Status: DISCONTINUED | OUTPATIENT
Start: 2023-07-25 | End: 2023-07-26

## 2023-07-25 RX ORDER — ACETAMINOPHEN 650 MG/1
650 SUPPOSITORY RECTAL EVERY 6 HOURS PRN
Status: DISCONTINUED | OUTPATIENT
Start: 2023-07-25 | End: 2023-07-26

## 2023-07-25 RX ORDER — HEPARIN SODIUM 1000 [USP'U]/ML
2000 INJECTION, SOLUTION INTRAVENOUS; SUBCUTANEOUS PRN
Status: DISCONTINUED | OUTPATIENT
Start: 2023-07-25 | End: 2023-07-25 | Stop reason: HOSPADM

## 2023-07-25 RX ORDER — POTASSIUM CHLORIDE 20 MEQ/1
40 TABLET, EXTENDED RELEASE ORAL PRN
Status: DISCONTINUED | OUTPATIENT
Start: 2023-07-25 | End: 2023-07-26

## 2023-07-25 RX ORDER — ONDANSETRON 2 MG/ML
4 INJECTION INTRAMUSCULAR; INTRAVENOUS EVERY 6 HOURS PRN
Status: DISCONTINUED | OUTPATIENT
Start: 2023-07-25 | End: 2023-07-26

## 2023-07-25 RX ORDER — HEPARIN SODIUM 1000 [USP'U]/ML
4000 INJECTION, SOLUTION INTRAVENOUS; SUBCUTANEOUS PRN
Status: DISCONTINUED | OUTPATIENT
Start: 2023-07-25 | End: 2023-07-25 | Stop reason: HOSPADM

## 2023-07-25 RX ORDER — ALPRAZOLAM 0.5 MG/1
0.5 TABLET ORAL 3 TIMES DAILY PRN
Status: DISCONTINUED | OUTPATIENT
Start: 2023-07-25 | End: 2023-07-26

## 2023-07-25 RX ORDER — MAGNESIUM SULFATE 1 G/100ML
1000 INJECTION INTRAVENOUS ONCE
Status: COMPLETED | OUTPATIENT
Start: 2023-07-25 | End: 2023-07-25

## 2023-07-25 RX ORDER — HEPARIN SODIUM 1000 [USP'U]/ML
4000 INJECTION, SOLUTION INTRAVENOUS; SUBCUTANEOUS ONCE
Status: DISCONTINUED | OUTPATIENT
Start: 2023-07-25 | End: 2023-07-25 | Stop reason: HOSPADM

## 2023-07-25 RX ORDER — ENOXAPARIN SODIUM 100 MG/ML
40 INJECTION SUBCUTANEOUS DAILY
Status: DISCONTINUED | OUTPATIENT
Start: 2023-07-25 | End: 2023-07-25

## 2023-07-25 RX ORDER — MORPHINE SULFATE 2 MG/ML
2 INJECTION, SOLUTION INTRAMUSCULAR; INTRAVENOUS EVERY 4 HOURS PRN
Status: DISCONTINUED | OUTPATIENT
Start: 2023-07-25 | End: 2023-07-26

## 2023-07-25 RX ORDER — HEPARIN SODIUM 10000 [USP'U]/100ML
5-30 INJECTION, SOLUTION INTRAVENOUS CONTINUOUS
Status: DISCONTINUED | OUTPATIENT
Start: 2023-07-25 | End: 2023-07-26

## 2023-07-25 RX ORDER — SODIUM CHLORIDE 0.9 % (FLUSH) 0.9 %
10 SYRINGE (ML) INJECTION PRN
Status: DISCONTINUED | OUTPATIENT
Start: 2023-07-25 | End: 2023-07-26

## 2023-07-25 RX ORDER — HEPARIN SODIUM 1000 [USP'U]/ML
2000 INJECTION, SOLUTION INTRAVENOUS; SUBCUTANEOUS PRN
Status: DISCONTINUED | OUTPATIENT
Start: 2023-07-25 | End: 2023-07-26

## 2023-07-25 RX ORDER — LIDOCAINE 4 G/G
1 PATCH TOPICAL DAILY
Status: DISCONTINUED | OUTPATIENT
Start: 2023-07-25 | End: 2023-07-26

## 2023-07-25 RX ORDER — ASPIRIN 81 MG/1
81 TABLET ORAL DAILY
Status: DISCONTINUED | OUTPATIENT
Start: 2023-07-25 | End: 2023-07-26

## 2023-07-25 RX ORDER — FENTANYL CITRATE 50 UG/ML
25 INJECTION, SOLUTION INTRAMUSCULAR; INTRAVENOUS
Status: DISCONTINUED | OUTPATIENT
Start: 2023-07-25 | End: 2023-07-25

## 2023-07-25 RX ORDER — OXYCODONE HYDROCHLORIDE AND ACETAMINOPHEN 5; 325 MG/1; MG/1
1 TABLET ORAL EVERY 4 HOURS PRN
Status: DISCONTINUED | OUTPATIENT
Start: 2023-07-25 | End: 2023-07-26

## 2023-07-25 RX ORDER — ATORVASTATIN CALCIUM 80 MG/1
80 TABLET, FILM COATED ORAL NIGHTLY
Status: DISCONTINUED | OUTPATIENT
Start: 2023-07-25 | End: 2023-07-26

## 2023-07-25 RX ORDER — ACETAMINOPHEN 325 MG/1
650 TABLET ORAL EVERY 6 HOURS PRN
Status: DISCONTINUED | OUTPATIENT
Start: 2023-07-25 | End: 2023-07-26

## 2023-07-25 RX ORDER — POTASSIUM CHLORIDE 7.45 MG/ML
10 INJECTION INTRAVENOUS PRN
Status: DISCONTINUED | OUTPATIENT
Start: 2023-07-25 | End: 2023-07-26

## 2023-07-25 RX ORDER — SODIUM CHLORIDE 9 MG/ML
INJECTION, SOLUTION INTRAVENOUS CONTINUOUS PRN
Status: COMPLETED | OUTPATIENT
Start: 2023-07-25 | End: 2023-07-25

## 2023-07-25 RX ORDER — HEPARIN SODIUM 1000 [USP'U]/ML
5000 INJECTION, SOLUTION INTRAVENOUS; SUBCUTANEOUS ONCE
Status: COMPLETED | OUTPATIENT
Start: 2023-07-25 | End: 2023-07-25

## 2023-07-25 RX ORDER — SODIUM CHLORIDE 9 MG/ML
INJECTION, SOLUTION INTRAVENOUS PRN
Status: DISCONTINUED | OUTPATIENT
Start: 2023-07-25 | End: 2023-07-26

## 2023-07-25 RX ORDER — METHOCARBAMOL 750 MG/1
750 TABLET, FILM COATED ORAL 4 TIMES DAILY
Status: DISCONTINUED | OUTPATIENT
Start: 2023-07-25 | End: 2023-07-26

## 2023-07-25 RX ORDER — SODIUM CHLORIDE 0.9 % (FLUSH) 0.9 %
5-40 SYRINGE (ML) INJECTION EVERY 12 HOURS SCHEDULED
Status: DISCONTINUED | OUTPATIENT
Start: 2023-07-25 | End: 2023-07-26

## 2023-07-25 RX ORDER — FLUTICASONE PROPIONATE 50 MCG
1 SPRAY, SUSPENSION (ML) NASAL AS NEEDED
Status: DISCONTINUED | OUTPATIENT
Start: 2023-07-25 | End: 2023-07-26

## 2023-07-25 RX ORDER — ONDANSETRON 4 MG/1
4 TABLET, ORALLY DISINTEGRATING ORAL EVERY 8 HOURS PRN
Status: DISCONTINUED | OUTPATIENT
Start: 2023-07-25 | End: 2023-07-26

## 2023-07-25 RX ORDER — ASPIRIN 325 MG
325 TABLET ORAL DAILY
Status: DISCONTINUED | OUTPATIENT
Start: 2023-07-25 | End: 2023-07-25 | Stop reason: HOSPADM

## 2023-07-25 RX ADMIN — OXYCODONE HYDROCHLORIDE AND ACETAMINOPHEN 1 TABLET: 5; 325 TABLET ORAL at 13:45

## 2023-07-25 RX ADMIN — ATORVASTATIN CALCIUM 80 MG: 80 TABLET, FILM COATED ORAL at 20:54

## 2023-07-25 RX ADMIN — OXYCODONE HYDROCHLORIDE AND ACETAMINOPHEN 1 TABLET: 5; 325 TABLET ORAL at 19:35

## 2023-07-25 RX ADMIN — DEXMEDETOMIDINE 0.2 MCG/KG/HR: 100 INJECTION, SOLUTION INTRAVENOUS at 05:11

## 2023-07-25 RX ADMIN — ACETAMINOPHEN 1000 MG: 500 TABLET ORAL at 11:35

## 2023-07-25 RX ADMIN — HEPARIN SODIUM 5000 UNITS: 1000 INJECTION INTRAVENOUS; SUBCUTANEOUS at 02:56

## 2023-07-25 RX ADMIN — Medication 0.4 MG: at 02:37

## 2023-07-25 RX ADMIN — METHOCARBAMOL 750 MG: 750 TABLET ORAL at 20:54

## 2023-07-25 RX ADMIN — ALPRAZOLAM 0.5 MG: 0.5 TABLET ORAL at 16:30

## 2023-07-25 RX ADMIN — POTASSIUM CHLORIDE 20 MEQ: 1500 TABLET, EXTENDED RELEASE ORAL at 13:46

## 2023-07-25 RX ADMIN — ONDANSETRON 4 MG: 2 INJECTION INTRAMUSCULAR; INTRAVENOUS at 01:51

## 2023-07-25 RX ADMIN — MAGNESIUM SULFATE HEPTAHYDRATE 1000 MG: 1 INJECTION, SOLUTION INTRAVENOUS at 13:52

## 2023-07-25 RX ADMIN — FENTANYL CITRATE 25 MCG: 50 INJECTION, SOLUTION INTRAMUSCULAR; INTRAVENOUS at 13:26

## 2023-07-25 RX ADMIN — FLUTICASONE PROPIONATE 1 SPRAY: 50 SPRAY, METERED NASAL at 16:55

## 2023-07-25 RX ADMIN — METHOCARBAMOL 750 MG: 750 TABLET ORAL at 16:30

## 2023-07-25 RX ADMIN — ALPRAZOLAM 0.5 MG: 0.5 TABLET ORAL at 20:54

## 2023-07-25 RX ADMIN — SODIUM CHLORIDE: 9 INJECTION, SOLUTION INTRAVENOUS at 05:13

## 2023-07-25 RX ADMIN — ASPIRIN 325 MG: 325 TABLET ORAL at 01:52

## 2023-07-25 ASSESSMENT — ENCOUNTER SYMPTOMS
ABDOMINAL PAIN: 0
APNEA: 0
ABDOMINAL DISTENTION: 0
SHORTNESS OF BREATH: 0
COLOR CHANGE: 0
BACK PAIN: 0
EYE REDNESS: 0
CHEST TIGHTNESS: 0
EYE PAIN: 0

## 2023-07-25 ASSESSMENT — PAIN DESCRIPTION - LOCATION
LOCATION: CHEST
LOCATION: CHEST

## 2023-07-25 ASSESSMENT — PAIN DESCRIPTION - DESCRIPTORS
DESCRIPTORS: HEAVINESS
DESCRIPTORS: ACHING

## 2023-07-25 ASSESSMENT — PAIN DESCRIPTION - ORIENTATION
ORIENTATION: MID
ORIENTATION: MID

## 2023-07-25 ASSESSMENT — PAIN SCALES - GENERAL
PAINLEVEL_OUTOF10: 8
PAINLEVEL_OUTOF10: 10
PAINLEVEL_OUTOF10: 5
PAINLEVEL_OUTOF10: 6
PAINLEVEL_OUTOF10: 4
PAINLEVEL_OUTOF10: 4

## 2023-07-25 NOTE — CONSULTS
[facility] Cardiology Cardiology    Consult                        Today's Date: 7/25/2023  Patient Name: Elvis Buchanan  Date of admission: 7/24/2023 11:43 AM  Patient's age: 80 y. o., 1940  Admission Dx: Stable angina ([address]) [I20.8]    Reason for Consult:  Chest pain    Requesting Physician: Rosario Mishra DO    CHIEF COMPLAINT:  Chest pain. History Obtained From:  patient    HISTORY OF PRESENT ILLNESS:      The patient is a 80 y.o.  male who is admitted to the hospital for Chest pain  The patient has multiple risk factors, including history of CAD and PCI many years back. Presented with mid sternal chest pain, dizziness and nausea. He has been having intermittent chest pain for last 2 months with no definite precipitating factor. Was trying to get up to rest room today when he felt dizzy and passed out. Was having also chest pain. I was called by intermed NP about chest pain and ECG changes. Because of ECG changes and Chest pain, Cath lab was activated for emergency coronary angiography and PCI. Past Medical History:   has a past medical history of Anxiety, CAD (coronary artery disease), GERD (gastroesophageal reflux disease), Gout, Hyperlipidemia, Hypertension, Lymphoma in remission ([address]), and Type II or unspecified type diabetes mellitus without mention of complication, not stated as uncontrolled. Past Surgical History:   has a past surgical history that includes Appendectomy (age 25); Coronary angioplasty with stent; Colonoscopy; and back surgery. Home Medications:    Prior to Admission medications    Medication Sig Start Date End Date Taking?  Authorizing Provider   levoFLOXacin (LEVAQUIN) 750 MG tablet Take 1 tablet by mouth daily for 10 days 7/21/23 7/31/23  Belle Renteria, DO   glipiZIDE (GLUCOTROL) 5 MG tablet TAKE 1 TABLET BY MOUTH TWICE A DAY 7/17/23   Belle Renteria, DO   diclofenac sodium (VOLTAREN) 1 % GEL Apply 4 g topically 4 times daily 6/5/23   Belle Renteria, DO   metoprolol

## 2023-07-25 NOTE — SIGNIFICANT EVENT
[facility]  Office: [address], DO, Geraldine Robertsr, DO, Clarene Meckel, DO, Jerad Wheeler Blood, DO, Juan Wilcox MD, Sharmin Mei MD, Cassy Morrell MD, Margarito Aguirre MD,  Jyoti Garcia MD, Snehal Branch MD, Leonardo Marcano, DO, Hina Christensen MD,  Santiago Santillan MD, Golden Elizabeth MD, John Westfall DO, Santosh Mancuso MD,  Raj Bentley DO, Becky Coppola MD, Matthew Kirk MD, Brayan Kumar MD, Brain Chun MD,  Keesha Cedeño MD, Teri Villatoro MD, Pop Stroud DO, [place] MD,  Kathrine Traylor MD, Vivian Ruvalcaba, CNP,  Ary Yo, CNP, Zachary Solo, CNP, Matt Burgess, CNP,  Trey Taylor, [facility], Samuel Mcnair, CNP, Zac Clemons, CNP, Meagan Cline, CNP, Benitez Martínez, CNP, Lisa Obando, CNP, Lindsey Ortiz PA-C, Mauricia Collet, CNS, Halie Grubbs, [facility], Vidal Kahn, [facility]   Nighttime In-House Provider      7/25/2023    4:42 AM    Name:   Lalita Mosqueda  MRN:     [id number]     Acct:      [de-identified]   Room:   2027/2027-01  IP Day:  0  Admit Date:  7/24/2023 11:43 AM    PCP:   Yoanna Garcia DO  Code Status:  Full Code    Called to the floor by staff to evaluate Lalita Mosqueda in 2027/2027-01 at 4:42 AM with complaint of CODE BLUE. Patient reportedly was in the bathroom and passed out, initially a Rapid Response was called, and then a code blue as patient was pulseless. Patient was lifted back to bed and placed on monitoring. Patient noted to be pale, responsive, complaining of chest pain. Patient hypotensive with SBP in the 90s, diaphoretic. EKG completed and showing elevation in anterior leads. Spoke with Dr Joselo Mcconnell who recommended that the interventionalist be called. Spoke with Dr Josh Fleming, EKGs reviewed. Decision made to call in cath lab for emergent cardiac cath/PCI. Assessment/Plan:     Syncope/ chest pain/ abnormal EKG: Cardiology and interventionalist consultation. Cardiac cath.  Aspirin 325

## 2023-07-25 NOTE — PROGRESS NOTES
Spoke with Balandras to initiate transfer to [facility] for higher level of care as patient found to have multi-vessel disease.

## 2023-07-25 NOTE — PLAN OF CARE
Problem: Discharge Planning  Goal: Discharge to home or other facility with appropriate resources  7/25/2023 0740 by Daniella Buckley RN  Outcome: Progressing  7/25/2023 0521 by Ashley Cleveland RN  Outcome: Progressing  Flowsheets  Taken 7/25/2023 0237 by Daniella Buckley RN  Discharge to home or other facility with appropriate resources:   Identify barriers to discharge with patient and caregiver   Arrange for needed discharge resources and transportation as appropriate  Taken 7/24/2023 1938 by Ashley Cleveland RN  Discharge to home or other facility with appropriate resources:   Identify barriers to discharge with patient and caregiver   Identify discharge learning needs (meds, wound care, etc)   Refer to discharge planning if patient needs post-hospital services based on physician order or complex needs related to functional status, cognitive ability or social support system   Arrange for interpreters to assist at discharge as needed  Taken 7/24/2023 1934 by Ashley Cleveland RN  Discharge to home or other facility with appropriate resources:   Identify barriers to discharge with patient and caregiver   Arrange for needed discharge resources and transportation as appropriate   Identify discharge learning needs (meds, wound care, etc)   Refer to discharge planning if patient needs post-hospital services based on physician order or complex needs related to functional status, cognitive ability or social support system     Problem: Pain  Goal: Verbalizes/displays adequate comfort level or baseline comfort level  7/25/2023 0740 by Daniella Buckley RN  Outcome: Progressing  7/25/2023 0521 by Ashlye Cleveland RN  Outcome: Progressing  Flowsheets (Taken 7/24/2023 2033)  Verbalizes/displays adequate comfort level or baseline comfort level:   Encourage patient to monitor pain and request assistance   Assess pain using appropriate pain scale   Administer analgesics based on type and severity of pain and evaluate

## 2023-07-25 NOTE — FLOWSHEET NOTE
Dr Sona Vail at bedside, orders received, Cath team on way in, Pt c/o chest pain and nausea, NTG SL x1 given, Heparin 5000 IV bolus given, pt prepped for cath lab. Undergarments removed,  Groins scrubbed with Chlorhexadine and shaved, wrists also scrubbed, INT flushed and capped.

## 2023-07-25 NOTE — PROGRESS NOTES
Patient arrived to Med surg 2022. Patient with with wife and daughter bedside. Patient alert and oriented, on room air. No complains of chest pains. Patient requested to take shower, writer educated that someone needs to be with patient. Patient's daughter assisted with shower. Patient walked to bathroom without any issues with daughter and writer. Patient returned to bed. Writer informed family d/t patient being in observation that patient needed home meds. Family brought home meds not in medication bottle. Educated the family that the medication had to be in the prescription bottle for us to be able to give the home meds.

## 2023-07-25 NOTE — PROGRESS NOTES
[facility]  Office: [address], DO, Yennifer Johnston, DO, Adrienne Mcbride, DO, Gurmeet Kowalskilee Blood, DO, Yunior Colbert MD, Vahid Schmidt MD, Donal Trinh MD, Nadja Hoffmann MD,  Aldo Foster MD, Noah Osborn MD, Marengo Lamp, DO, Jose Enrique Cronin MD,  Inna Duran MD, Aguie Lima MD, Maryann Valle DO, Fiorella Whyte MD,  Bertrand Garcia, DO, Charles Lilly MD, Kvng Marquez MD, Lucas Larios MD, Audra Henriquez MD,  Jose C Nixon MD, Greg Houser MD, Skylar Sewell, DO, Misael Meade MD,  Carolin Luong MD, Rebekah White, Maty Haile, CNP, Saturnino Cui, CNP, Thee Lowry, CNP,  Jenny Lim, [facility], Elham Yu, CNP, Mami Lauren, CNP, Malu Dawson, CNP, Gloria Garcia, CNP, Dotty Escalante, CNP, SHANNAN ArangoC, [place], [facility], Adolph Leavitt, CNP, Yessy Alfaro, [facility]    Progress Note    7/25/2023    8:35 AM    Name:   Jeana Theodore  MRN:     [id number]     Acct:      [de-identified]   Room:   2027/2027-01  IP Day:  0  Admit Date:  7/24/2023 11:43 AM    PCP:   Candy Hernandez DO  Code Status:  Full Code    Subjective:     C/C:   Chief Complaint   Patient presents with    Chest Pain     Pt reports midsternal chest pressure with nausea. Last took zofran around 1030 this morning. Dizziness     Interval History Status: worsened. Patient seen and examined. On baloon pump BP stable, good urine output, reviewed images for IABP, around the 4th intercostal space. COntinues on 1:1, MAP at 65, Heart rate stable in 70s saturating well on room air. Brief History:        80year old male with past medical history of GERD, HLD, HTN, CAD, CKD, presents with syncope.  Patient was ambulating to restroom found to be severely bradycardic and found to have new found EKG changes underwent Cardiac catheterization on 7/25/23 found othave severe MVD and balloon insertion with MVD on baloon pump. 1:1 transfer to [facility] for further evaluation by CT surgery.   Diabetes, ISS  GERD< protonix  BPH on flomax  Hold Toprol XL due to hypotension at this time  Discussed with nurse    Khoa Mendoza MD  7/25/2023  8:35 AM

## 2023-07-25 NOTE — PROGRESS NOTES
Patient called out to use bathroom, Jenny Beverly how patient ambulated, writer informed that RN independently. Per Asad Roblero RN patient went to the bathroom and shut the door and RN stated she heard a loud noise, checked on patient, patient on floor. Writer arrived to room, patient nonresponsive on floor with RN. No pulse, started Chest compression. Rosc after 15 compressions no meds given. Placed patient back in bed. Placed on Nonrebreather. EKG done. Fluid bolus. Gave zofran for nausea, 325 aspirin.  Cardiology contacted, transferred patient to CVICU, gave bedside report to [facility]

## 2023-07-25 NOTE — DISCHARGE SUMMARY
[facility]  Office: [address], DO, Mario Davison, DO, Marcella Long, DO, Priti Baez, DO, Inder Huitron MD, Jessica Deutsch MD, Anneliese Yan MD, Liliane Oquendo MD,  Juarez Urias MD, Royer Agustin MD, Nicole Ramires, DO, Natalie Luz MD,  Sudha Figueroa, DO, Rik Hameed MD, Anurag Sadler MD, Asad Salinas, DO, Vignesh Rivera MD,  Andreia Blanc, DO, Makenzie Juarez MD, Supriya Elias MD, Susan Booth MD, Saad Banks MD,  Lima Lara MD, Amado Nunes MD, Kelsi Mcmanus, DO, Hernando Kulkarni MD,  Yomaira Rodriguez MD, Carlos Christopher, CNP,  Dena Ruiz, CNP, Rose Adams, CNP, Mandi Espino, CNP,  Jessica Reich, [facility], Cj Baxter, CNP, Karma Humphrey, CNP, Carolina Samuel, CNP, Corazon Huffman, CNP, Linda Lawrence, CNP, Osiris Ortiz PAAdelitaC, Hari Merino, CNS, Anneliese Arechiga, [facility], Nic Linares, [facility]    Discharge Summary     Patient ID: Shell Gaines  :  1940   MRN: [id number]     ACCOUNT:  [de-identified]   Patient's PCP: Eliza King DO  Admit Date: 2023   Discharge Date: 2023     Length of Stay: 0  Code Status:  Full Code  Admitting Physician: Ethelyn Cogan, DO  Discharge Physician: Natalie Luz MD     Active Discharge Diagnoses:     Hospital Problem Lists:  Principal Problem:    Stable angina [facility]  Active Problems:    Hypertension    Type 2 diabetes mellitus with hyperglycemia, without long-term current use of insulin (HCC)    CAD (coronary artery disease)    CKD (chronic kidney disease)    Urinary retention    Syncope    Other chest pain    Hypotension  Resolved Problems:    * No resolved hospital problems.  *      Admission Condition:  serious     Discharged Condition: serious    Hospital Stay:     Hospital Course:  Shell Gaines is a 80 y.o. male who was admitted for the management of  Stable angina ([address] [facility] , presented to ER with Chest Pain (Pt

## 2023-07-26 ENCOUNTER — APPOINTMENT (OUTPATIENT)
Dept: GENERAL RADIOLOGY | Age: 83
DRG: 235 | End: 2023-07-26
Attending: INTERNAL MEDICINE
Payer: MEDICARE

## 2023-07-26 ENCOUNTER — ANESTHESIA EVENT (OUTPATIENT)
Dept: OPERATING ROOM | Age: 83
End: 2023-07-26
Payer: MEDICARE

## 2023-07-26 ENCOUNTER — ANESTHESIA (OUTPATIENT)
Dept: OPERATING ROOM | Age: 83
End: 2023-07-26
Payer: MEDICARE

## 2023-07-26 LAB
ALBUMIN SERPL-MCNC: 3.4 G/DL (ref 3.5–5.2)
ALLEN TEST: ABNORMAL
ALLEN TEST: ABNORMAL
ANION GAP SERPL CALCULATED.3IONS-SCNC: 11 MMOL/L (ref 9–17)
ANION GAP SERPL CALCULATED.3IONS-SCNC: 15 MMOL/L (ref 9–17)
BUN BLD-MCNC: 18 MG/DL (ref 8–26)
BUN SERPL-MCNC: 19 MG/DL (ref 8–23)
BUN SERPL-MCNC: 24 MG/DL (ref 8–23)
CA-I BLD-SCNC: 1.11 MMOL/L (ref 1.15–1.33)
CA-I BLD-SCNC: 1.12 MMOL/L (ref 1.15–1.33)
CA-I BLD-SCNC: 1.15 MMOL/L (ref 1.15–1.33)
CA-I BLD-SCNC: 1.21 MMOL/L (ref 1.15–1.33)
CA-I BLD-SCNC: 1.23 MMOL/L (ref 1.13–1.33)
CA-I BLD-SCNC: 1.25 MMOL/L (ref 1.15–1.33)
CA-I BLD-SCNC: 1.25 MMOL/L (ref 1.15–1.33)
CA-I BLD-SCNC: 1.28 MMOL/L (ref 1.15–1.33)
CALCIUM SERPL-MCNC: 8.8 MG/DL (ref 8.6–10.4)
CALCIUM SERPL-MCNC: 9 MG/DL (ref 8.6–10.4)
CHLORIDE BLD-SCNC: 114 MMOL/L (ref 98–107)
CHLORIDE SERPL-SCNC: 106 MMOL/L (ref 98–107)
CHLORIDE SERPL-SCNC: 109 MMOL/L (ref 98–107)
CLOT ANGLE.KAOLIN INDUCED BLD RES TEG: 65.5 DEG (ref 63–78)
CLOT ANGLE.KAOLIN INDUCED BLD RES TEG: 78.9 DEG (ref 63–78)
CO2 BLD CALC-SCNC: 18 MMOL/L (ref 22–30)
CO2 SERPL-SCNC: 18 MMOL/L (ref 20–31)
CO2 SERPL-SCNC: 19 MMOL/L (ref 20–31)
CREAT SERPL-MCNC: 1.2 MG/DL (ref 0.7–1.2)
CREAT SERPL-MCNC: 1.2 MG/DL (ref 0.7–1.2)
EGFR, POC: >60 ML/MIN/1.73M2
EKG ATRIAL RATE: 62 BPM
EKG ATRIAL RATE: 65 BPM
EKG ATRIAL RATE: 80 BPM
EKG P AXIS: 30 DEGREES
EKG P AXIS: 36 DEGREES
EKG P AXIS: 67 DEGREES
EKG P-R INTERVAL: 186 MS
EKG P-R INTERVAL: 202 MS
EKG P-R INTERVAL: 218 MS
EKG Q-T INTERVAL: 402 MS
EKG Q-T INTERVAL: 446 MS
EKG Q-T INTERVAL: 452 MS
EKG QRS DURATION: 146 MS
EKG QRS DURATION: 162 MS
EKG QRS DURATION: 162 MS
EKG QTC CALCULATION (BAZETT): 458 MS
EKG QTC CALCULATION (BAZETT): 463 MS
EKG QTC CALCULATION (BAZETT): 463 MS
EKG R AXIS: -57 DEGREES
EKG R AXIS: -58 DEGREES
EKG R AXIS: -71 DEGREES
EKG T AXIS: 12 DEGREES
EKG T AXIS: 64 DEGREES
EKG T AXIS: 8 DEGREES
EKG VENTRICULAR RATE: 62 BPM
EKG VENTRICULAR RATE: 65 BPM
EKG VENTRICULAR RATE: 80 BPM
ERYTHROCYTE [DISTWIDTH] IN BLOOD BY AUTOMATED COUNT: 13 % (ref 11.8–14.4)
ERYTHROCYTE [DISTWIDTH] IN BLOOD BY AUTOMATED COUNT: 14.6 % (ref 11.8–14.4)
FIBRINOGEN, FUNCTIONAL TEG: 20.5 MM (ref 15–32)
FIBRINOGEN, FUNCTIONAL TEG: 40.5 MM (ref 15–32)
FIO2: 100
FIO2: 40
FIO2: 40
FIO2: 60
GFR SERPL CREATININE-BSD FRML MDRD: >60 ML/MIN/1.73M2
GFR SERPL CREATININE-BSD FRML MDRD: >60 ML/MIN/1.73M2
GLUCOSE BLD-MCNC: 122 MG/DL (ref 74–100)
GLUCOSE BLD-MCNC: 124 MG/DL (ref 74–100)
GLUCOSE BLD-MCNC: 148 MG/DL (ref 74–100)
GLUCOSE BLD-MCNC: 166 MG/DL (ref 74–100)
GLUCOSE BLD-MCNC: 180 MG/DL (ref 74–100)
GLUCOSE BLD-MCNC: 181 MG/DL (ref 74–100)
GLUCOSE BLD-MCNC: 183 MG/DL (ref 74–100)
GLUCOSE BLD-MCNC: 66 MG/DL (ref 74–100)
GLUCOSE BLD-MCNC: 79 MG/DL (ref 74–100)
GLUCOSE BLD-MCNC: 99 MG/DL (ref 74–100)
GLUCOSE SERPL-MCNC: 124 MG/DL (ref 70–99)
GLUCOSE SERPL-MCNC: 201 MG/DL (ref 70–99)
HCT VFR BLD AUTO: 21 % (ref 41–53)
HCT VFR BLD AUTO: 22 % (ref 41–53)
HCT VFR BLD AUTO: 22 % (ref 41–53)
HCT VFR BLD AUTO: 27 % (ref 41–53)
HCT VFR BLD AUTO: 28 % (ref 41–53)
HCT VFR BLD AUTO: 31 % (ref 41–53)
HCT VFR BLD AUTO: 31 % (ref 41–53)
HCT VFR BLD AUTO: 31.8 % (ref 40.7–50.3)
HCT VFR BLD AUTO: 32 % (ref 40.7–50.3)
HGB BLD-MCNC: 10.3 G/DL (ref 13–17)
HGB BLD-MCNC: 10.6 G/DL (ref 13–17)
INR PPP: 1.1
INR PPP: 1.5
KINETICS TEG: 0.8 MIN (ref 0.8–2.1)
KINETICS TEG: 1.9 MIN (ref 0.8–2.1)
MA (MAX CLOT) TEG: 60.8 MM (ref 52–69)
MA (MAX CLOT) TEG: 70.1 MM (ref 52–69)
MA(MAX CLOT) RAPID TEG: 61.5 MM (ref 52–70)
MA(MAX CLOT) RAPID TEG: 71 MM (ref 52–70)
MAGNESIUM SERPL-MCNC: 2.1 MG/DL (ref 1.6–2.6)
MAGNESIUM SERPL-MCNC: 2.8 MG/DL (ref 1.6–2.6)
MCH RBC QN AUTO: 30.7 PG (ref 25.2–33.5)
MCH RBC QN AUTO: 31.5 PG (ref 25.2–33.5)
MCHC RBC AUTO-ENTMCNC: 32.2 G/DL (ref 28.4–34.8)
MCHC RBC AUTO-ENTMCNC: 33.3 G/DL (ref 28.4–34.8)
MCV RBC AUTO: 92.2 FL (ref 82.6–102.9)
MCV RBC AUTO: 97.9 FL (ref 82.6–102.9)
MICROORGANISM/AGENT SPEC: ABNORMAL
MICROORGANISM/AGENT SPEC: ABNORMAL
MODE: ABNORMAL
MODE: ABNORMAL
MRSA, DNA, NASAL: NEGATIVE
NEGATIVE BASE EXCESS, ART: 2.4 MMOL/L (ref 0–2)
NEGATIVE BASE EXCESS, ART: 2.6 MMOL/L (ref 0–2)
NEGATIVE BASE EXCESS, ART: 3.3 MMOL/L (ref 0–2)
NEGATIVE BASE EXCESS, ART: 3.5 MMOL/L (ref 0–2)
NEGATIVE BASE EXCESS, ART: 3.6 MMOL/L (ref 0–2)
NEGATIVE BASE EXCESS, ART: 4.9 MMOL/L (ref 0–2)
NEGATIVE BASE EXCESS, ART: 5.5 MMOL/L (ref 0–2)
NEGATIVE BASE EXCESS, ART: 5.7 MMOL/L (ref 0–2)
NEGATIVE BASE EXCESS, ART: 6.2 MMOL/L (ref 0–2)
NEGATIVE BASE EXCESS, ART: 7.9 MMOL/L (ref 0–2)
NRBC BLD-RTO: 0 PER 100 WBC
NRBC BLD-RTO: 0 PER 100 WBC
O2 DELIVERY DEVICE: ABNORMAL
PARTIAL THROMBOPLASTIN TIME: 29.6 SEC (ref 23–36.5)
PERFORMING LOCATION: ABNORMAL
PERFORMING LOCATION: NORMAL
PHOSPHATE SERPL-MCNC: 3.4 MG/DL (ref 2.5–4.5)
PLATELET # BLD AUTO: 174 K/UL (ref 138–453)
PLATELET # BLD AUTO: 321 K/UL (ref 138–453)
PMV BLD AUTO: 9.2 FL (ref 8.1–13.5)
PMV BLD AUTO: 9.2 FL (ref 8.1–13.5)
POC ANION GAP: 13 MMOL/L (ref 7–16)
POC CREATININE: 1 MG/DL (ref 0.51–1.19)
POC HCO3: 18.2 MMOL/L (ref 21–28)
POC HCO3: 18.6 MMOL/L (ref 21–28)
POC HCO3: 19.1 MMOL/L (ref 21–28)
POC HCO3: 20.2 MMOL/L (ref 21–28)
POC HCO3: 20.5 MMOL/L (ref 21–28)
POC HCO3: 20.6 MMOL/L (ref 21–28)
POC HCO3: 21.6 MMOL/L (ref 21–28)
POC HCO3: 21.9 MMOL/L (ref 21–28)
POC HCO3: 22.7 MMOL/L (ref 21–28)
POC HCO3: 23 MMOL/L (ref 21–28)
POC HEMOGLOBIN (CALC): 10.4 G/DL (ref 13.5–17.5)
POC HEMOGLOBIN (CALC): 10.4 G/DL (ref 13.5–17.5)
POC HEMOGLOBIN (CALC): 7 G/DL (ref 13.5–17.5)
POC HEMOGLOBIN (CALC): 7.3 G/DL (ref 13.5–17.5)
POC HEMOGLOBIN (CALC): 7.4 G/DL (ref 13.5–17.5)
POC HEMOGLOBIN (CALC): 9.2 G/DL (ref 13.5–17.5)
POC HEMOGLOBIN (CALC): 9.6 G/DL (ref 13.5–17.5)
POC LACTIC ACID: 1.7 MMOL/L (ref 0.56–1.39)
POC LACTIC ACID: 2.6 MMOL/L (ref 0.56–1.39)
POC LACTIC ACID: 3 MMOL/L (ref 0.56–1.39)
POC O2 SATURATION: 100 % (ref 94–98)
POC O2 SATURATION: 98.7 % (ref 94–98)
POC O2 SATURATION: 99.1 % (ref 94–98)
POC O2 SATURATION: 99.1 % (ref 94–98)
POC O2 SATURATION: 99.8 % (ref 94–98)
POC O2 SATURATION: 99.9 % (ref 94–98)
POC PCO2: 30.9 MM HG (ref 35–48)
POC PCO2: 31.8 MM HG (ref 35–48)
POC PCO2: 32.6 MM HG (ref 35–48)
POC PCO2: 32.9 MM HG (ref 35–48)
POC PCO2: 38.5 MM HG (ref 35–48)
POC PCO2: 39 MM HG (ref 35–48)
POC PCO2: 40.1 MM HG (ref 35–48)
POC PCO2: 42.5 MM HG (ref 35–48)
POC PCO2: 43.3 MM HG (ref 35–48)
POC PCO2: 44.2 MM HG (ref 35–48)
POC PH: 7.28 (ref 7.35–7.45)
POC PH: 7.28 (ref 7.35–7.45)
POC PH: 7.32 (ref 7.35–7.45)
POC PH: 7.33 (ref 7.35–7.45)
POC PH: 7.33 (ref 7.35–7.45)
POC PH: 7.34 (ref 7.35–7.45)
POC PH: 7.38 (ref 7.35–7.45)
POC PH: 7.39 (ref 7.35–7.45)
POC PH: 7.42 (ref 7.35–7.45)
POC PH: 7.43 (ref 7.35–7.45)
POC PO2: 130.2 MM HG (ref 83–108)
POC PO2: 137.4 MM HG (ref 83–108)
POC PO2: 144.6 MM HG (ref 83–108)
POC PO2: 235.6 MM HG (ref 83–108)
POC PO2: 282.3 MM HG (ref 83–108)
POC PO2: 284.9 MM HG (ref 83–108)
POC PO2: 328.6 MM HG (ref 83–108)
POC PO2: 374 MM HG (ref 83–108)
POC PO2: 441.9 MM HG (ref 83–108)
POC PO2: 470.1 MM HG (ref 83–108)
POTASSIUM BLD-SCNC: 3.5 MMOL/L (ref 3.5–4.5)
POTASSIUM BLD-SCNC: 3.5 MMOL/L (ref 3.5–4.5)
POTASSIUM BLD-SCNC: 3.8 MMOL/L (ref 3.5–4.5)
POTASSIUM BLD-SCNC: 3.9 MMOL/L (ref 3.5–4.5)
POTASSIUM BLD-SCNC: 4.2 MMOL/L (ref 3.5–4.5)
POTASSIUM BLD-SCNC: 4.6 MMOL/L (ref 3.5–4.5)
POTASSIUM BLD-SCNC: 4.7 MMOL/L (ref 3.5–4.5)
POTASSIUM SERPL-SCNC: 4 MMOL/L (ref 3.7–5.3)
POTASSIUM SERPL-SCNC: 4.2 MMOL/L (ref 3.7–5.3)
PROTHROMBIN TIME: 13.6 SEC (ref 11.7–14.9)
PROTHROMBIN TIME: 18.2 SEC (ref 11.7–14.9)
RBC # BLD AUTO: 3.27 M/UL (ref 4.21–5.77)
RBC # BLD AUTO: 3.45 M/UL (ref 4.21–5.77)
REACTION TIME TEG W HEPARIN: 4.3 MIN (ref 4.3–8.3)
REACTION TIME TEG W HEPARIN: 7.2 MIN (ref 4.3–8.3)
REACTION TIME TEG: 5.1 MIN (ref 4.6–9.1)
REACTION TIME TEG: 7.7 MIN (ref 4.6–9.1)
SAMPLE SITE: ABNORMAL
SODIUM BLD-SCNC: 144 MMOL/L (ref 138–146)
SODIUM SERPL-SCNC: 136 MMOL/L (ref 135–144)
SODIUM SERPL-SCNC: 142 MMOL/L (ref 135–144)
SPECIMEN DESCRIPTION: ABNORMAL
SPECIMEN DESCRIPTION: NORMAL
WBC OTHER # BLD: 16.1 K/UL (ref 3.5–11.3)
WBC OTHER # BLD: 7.6 K/UL (ref 3.5–11.3)

## 2023-07-26 PROCEDURE — B24BZZ4 ULTRASONOGRAPHY OF HEART WITH AORTA, TRANSESOPHAGEAL: ICD-10-PCS | Performed by: THORACIC SURGERY (CARDIOTHORACIC VASCULAR SURGERY)

## 2023-07-26 PROCEDURE — A4217 STERILE WATER/SALINE, 500 ML: HCPCS | Performed by: THORACIC SURGERY (CARDIOTHORACIC VASCULAR SURGERY)

## 2023-07-26 PROCEDURE — 71045 X-RAY EXAM CHEST 1 VIEW: CPT

## 2023-07-26 PROCEDURE — 37799 UNLISTED PX VASCULAR SURGERY: CPT

## 2023-07-26 PROCEDURE — 94761 N-INVAS EAR/PLS OXIMETRY MLT: CPT

## 2023-07-26 PROCEDURE — 83605 ASSAY OF LACTIC ACID: CPT

## 2023-07-26 PROCEDURE — 87086 URINE CULTURE/COLONY COUNT: CPT

## 2023-07-26 PROCEDURE — 85576 BLOOD PLATELET AGGREGATION: CPT

## 2023-07-26 PROCEDURE — 3600000018 HC SURGERY OHS ADDTL 15MIN: Performed by: THORACIC SURGERY (CARDIOTHORACIC VASCULAR SURGERY)

## 2023-07-26 PROCEDURE — 06BP4ZZ EXCISION OF RIGHT SAPHENOUS VEIN, PERCUTANEOUS ENDOSCOPIC APPROACH: ICD-10-PCS | Performed by: THORACIC SURGERY (CARDIOTHORACIC VASCULAR SURGERY)

## 2023-07-26 PROCEDURE — P9016 RBC LEUKOCYTES REDUCED: HCPCS

## 2023-07-26 PROCEDURE — 82947 ASSAY GLUCOSE BLOOD QUANT: CPT

## 2023-07-26 PROCEDURE — 82330 ASSAY OF CALCIUM: CPT

## 2023-07-26 PROCEDURE — 85610 PROTHROMBIN TIME: CPT

## 2023-07-26 PROCEDURE — 93005 ELECTROCARDIOGRAM TRACING: CPT | Performed by: PHYSICIAN ASSISTANT

## 2023-07-26 PROCEDURE — 2700000000 HC OXYGEN THERAPY PER DAY

## 2023-07-26 PROCEDURE — 85027 COMPLETE CBC AUTOMATED: CPT

## 2023-07-26 PROCEDURE — 93010 ELECTROCARDIOGRAM REPORT: CPT | Performed by: INTERNAL MEDICINE

## 2023-07-26 PROCEDURE — 6360000002 HC RX W HCPCS: Performed by: THORACIC SURGERY (CARDIOTHORACIC VASCULAR SURGERY)

## 2023-07-26 PROCEDURE — 82803 BLOOD GASES ANY COMBINATION: CPT

## 2023-07-26 PROCEDURE — 80069 RENAL FUNCTION PANEL: CPT

## 2023-07-26 PROCEDURE — P9045 ALBUMIN (HUMAN), 5%, 250 ML: HCPCS | Performed by: STUDENT IN AN ORGANIZED HEALTH CARE EDUCATION/TRAINING PROGRAM

## 2023-07-26 PROCEDURE — 2100000001 HC CVICU R&B

## 2023-07-26 PROCEDURE — 7100000000 HC PACU RECOVERY - FIRST 15 MIN

## 2023-07-26 PROCEDURE — 33508 ENDOSCOPIC VEIN HARVEST: CPT | Performed by: THORACIC SURGERY (CARDIOTHORACIC VASCULAR SURGERY)

## 2023-07-26 PROCEDURE — 94002 VENT MGMT INPAT INIT DAY: CPT

## 2023-07-26 PROCEDURE — 76998 US GUIDE INTRAOP: CPT | Performed by: THORACIC SURGERY (CARDIOTHORACIC VASCULAR SURGERY)

## 2023-07-26 PROCEDURE — 2580000003 HC RX 258: Performed by: PHYSICIAN ASSISTANT

## 2023-07-26 PROCEDURE — 84520 ASSAY OF UREA NITROGEN: CPT

## 2023-07-26 PROCEDURE — 33518 CABG ARTERY-VEIN TWO: CPT | Performed by: THORACIC SURGERY (CARDIOTHORACIC VASCULAR SURGERY)

## 2023-07-26 PROCEDURE — 7100000001 HC PACU RECOVERY - ADDTL 15 MIN

## 2023-07-26 PROCEDURE — 86901 BLOOD TYPING SEROLOGIC RH(D): CPT

## 2023-07-26 PROCEDURE — 85347 COAGULATION TIME ACTIVATED: CPT

## 2023-07-26 PROCEDURE — 80051 ELECTROLYTE PANEL: CPT

## 2023-07-26 PROCEDURE — 82565 ASSAY OF CREATININE: CPT

## 2023-07-26 PROCEDURE — 6360000002 HC RX W HCPCS: Performed by: STUDENT IN AN ORGANIZED HEALTH CARE EDUCATION/TRAINING PROGRAM

## 2023-07-26 PROCEDURE — 2500000003 HC RX 250 WO HCPCS: Performed by: STUDENT IN AN ORGANIZED HEALTH CARE EDUCATION/TRAINING PROGRAM

## 2023-07-26 PROCEDURE — 02100Z9 BYPASS CORONARY ARTERY, ONE ARTERY FROM LEFT INTERNAL MAMMARY, OPEN APPROACH: ICD-10-PCS | Performed by: THORACIC SURGERY (CARDIOTHORACIC VASCULAR SURGERY)

## 2023-07-26 PROCEDURE — 86850 RBC ANTIBODY SCREEN: CPT

## 2023-07-26 PROCEDURE — 3700000001 HC ADD 15 MINUTES (ANESTHESIA): Performed by: THORACIC SURGERY (CARDIOTHORACIC VASCULAR SURGERY)

## 2023-07-26 PROCEDURE — 6370000000 HC RX 637 (ALT 250 FOR IP): Performed by: STUDENT IN AN ORGANIZED HEALTH CARE EDUCATION/TRAINING PROGRAM

## 2023-07-26 PROCEDURE — 06BQ4ZZ EXCISION OF LEFT SAPHENOUS VEIN, PERCUTANEOUS ENDOSCOPIC APPROACH: ICD-10-PCS | Performed by: THORACIC SURGERY (CARDIOTHORACIC VASCULAR SURGERY)

## 2023-07-26 PROCEDURE — 85730 THROMBOPLASTIN TIME PARTIAL: CPT

## 2023-07-26 PROCEDURE — 86900 BLOOD TYPING SEROLOGIC ABO: CPT

## 2023-07-26 PROCEDURE — C1889 IMPLANT/INSERT DEVICE, NOC: HCPCS | Performed by: THORACIC SURGERY (CARDIOTHORACIC VASCULAR SURGERY)

## 2023-07-26 PROCEDURE — 6370000000 HC RX 637 (ALT 250 FOR IP): Performed by: THORACIC SURGERY (CARDIOTHORACIC VASCULAR SURGERY)

## 2023-07-26 PROCEDURE — 6370000000 HC RX 637 (ALT 250 FOR IP): Performed by: INTERNAL MEDICINE

## 2023-07-26 PROCEDURE — P9041 ALBUMIN (HUMAN),5%, 50ML: HCPCS | Performed by: PHYSICIAN ASSISTANT

## 2023-07-26 PROCEDURE — 83735 ASSAY OF MAGNESIUM: CPT

## 2023-07-26 PROCEDURE — 2500000003 HC RX 250 WO HCPCS: Performed by: PHYSICIAN ASSISTANT

## 2023-07-26 PROCEDURE — 2580000003 HC RX 258: Performed by: STUDENT IN AN ORGANIZED HEALTH CARE EDUCATION/TRAINING PROGRAM

## 2023-07-26 PROCEDURE — 6360000002 HC RX W HCPCS: Performed by: PHYSICIAN ASSISTANT

## 2023-07-26 PROCEDURE — 3600000008 HC SURGERY OHS BASE: Performed by: THORACIC SURGERY (CARDIOTHORACIC VASCULAR SURGERY)

## 2023-07-26 PROCEDURE — 2720000010 HC SURG SUPPLY STERILE: Performed by: THORACIC SURGERY (CARDIOTHORACIC VASCULAR SURGERY)

## 2023-07-26 PROCEDURE — 2709999900 HC NON-CHARGEABLE SUPPLY: Performed by: THORACIC SURGERY (CARDIOTHORACIC VASCULAR SURGERY)

## 2023-07-26 PROCEDURE — 5A1221Z PERFORMANCE OF CARDIAC OUTPUT, CONTINUOUS: ICD-10-PCS | Performed by: THORACIC SURGERY (CARDIOTHORACIC VASCULAR SURGERY)

## 2023-07-26 PROCEDURE — 84132 ASSAY OF SERUM POTASSIUM: CPT

## 2023-07-26 PROCEDURE — 80048 BASIC METABOLIC PNL TOTAL CA: CPT

## 2023-07-26 PROCEDURE — 6370000000 HC RX 637 (ALT 250 FOR IP): Performed by: PHYSICIAN ASSISTANT

## 2023-07-26 PROCEDURE — 33533 CABG ARTERIAL SINGLE: CPT | Performed by: THORACIC SURGERY (CARDIOTHORACIC VASCULAR SURGERY)

## 2023-07-26 PROCEDURE — 86920 COMPATIBILITY TEST SPIN: CPT

## 2023-07-26 PROCEDURE — 3700000000 HC ANESTHESIA ATTENDED CARE: Performed by: THORACIC SURGERY (CARDIOTHORACIC VASCULAR SURGERY)

## 2023-07-26 PROCEDURE — 85014 HEMATOCRIT: CPT

## 2023-07-26 PROCEDURE — C1729 CATH, DRAINAGE: HCPCS | Performed by: THORACIC SURGERY (CARDIOTHORACIC VASCULAR SURGERY)

## 2023-07-26 PROCEDURE — 85384 FIBRINOGEN ACTIVITY: CPT

## 2023-07-26 PROCEDURE — 99222 1ST HOSP IP/OBS MODERATE 55: CPT | Performed by: INTERNAL MEDICINE

## 2023-07-26 PROCEDURE — 021109W BYPASS CORONARY ARTERY, TWO ARTERIES FROM AORTA WITH AUTOLOGOUS VENOUS TISSUE, OPEN APPROACH: ICD-10-PCS | Performed by: THORACIC SURGERY (CARDIOTHORACIC VASCULAR SURGERY)

## 2023-07-26 PROCEDURE — 36415 COLL VENOUS BLD VENIPUNCTURE: CPT

## 2023-07-26 PROCEDURE — 2580000003 HC RX 258: Performed by: THORACIC SURGERY (CARDIOTHORACIC VASCULAR SURGERY)

## 2023-07-26 DEVICE — CLIP LIG M BLU TI HRT SHP WIRE HORZ 6 CLIPS PER PK: Type: IMPLANTABLE DEVICE | Site: CHEST | Status: FUNCTIONAL

## 2023-07-26 DEVICE — CLIP INT SM WIDE WECK RED TI TRNSVRS GRV CHEVRON SHP W/ PERCIS TIP 24 PER PK: Type: IMPLANTABLE DEVICE | Site: CHEST | Status: FUNCTIONAL

## 2023-07-26 DEVICE — CLIP INT M L GRN TI TRNSVRS GRV CHEVRON SHP W/ PRECIS TIP: Type: IMPLANTABLE DEVICE | Site: CHEST | Status: FUNCTIONAL

## 2023-07-26 RX ORDER — ISOFLURANE 1 ML/ML
LIQUID RESPIRATORY (INHALATION)
Status: DISCONTINUED
Start: 2023-07-26 | End: 2023-07-26

## 2023-07-26 RX ORDER — MIDAZOLAM HYDROCHLORIDE 1 MG/ML
INJECTION INTRAMUSCULAR; INTRAVENOUS PRN
Status: DISCONTINUED | OUTPATIENT
Start: 2023-07-26 | End: 2023-07-26 | Stop reason: SDUPTHER

## 2023-07-26 RX ORDER — PROPOFOL 10 MG/ML
INJECTION, EMULSION INTRAVENOUS PRN
Status: DISCONTINUED | OUTPATIENT
Start: 2023-07-26 | End: 2023-07-26 | Stop reason: SDUPTHER

## 2023-07-26 RX ORDER — ALBUMIN, HUMAN INJ 5% 5 %
SOLUTION INTRAVENOUS PRN
Status: DISCONTINUED | OUTPATIENT
Start: 2023-07-26 | End: 2023-07-26 | Stop reason: SDUPTHER

## 2023-07-26 RX ORDER — ATORVASTATIN CALCIUM 40 MG/1
20 TABLET, FILM COATED ORAL NIGHTLY
Status: DISCONTINUED | OUTPATIENT
Start: 2023-07-27 | End: 2023-08-06 | Stop reason: HOSPADM

## 2023-07-26 RX ORDER — FENTANYL CITRATE 50 UG/ML
25 INJECTION, SOLUTION INTRAMUSCULAR; INTRAVENOUS
Status: DISCONTINUED | OUTPATIENT
Start: 2023-07-26 | End: 2023-08-06 | Stop reason: HOSPADM

## 2023-07-26 RX ORDER — DEXTROSE MONOHYDRATE 100 MG/ML
INJECTION, SOLUTION INTRAVENOUS CONTINUOUS PRN
Status: DISCONTINUED | OUTPATIENT
Start: 2023-07-26 | End: 2023-08-06 | Stop reason: HOSPADM

## 2023-07-26 RX ORDER — OXYCODONE HYDROCHLORIDE AND ACETAMINOPHEN 5; 325 MG/1; MG/1
2 TABLET ORAL EVERY 4 HOURS PRN
Status: DISCONTINUED | OUTPATIENT
Start: 2023-07-26 | End: 2023-08-06 | Stop reason: HOSPADM

## 2023-07-26 RX ORDER — SENNA AND DOCUSATE SODIUM 50; 8.6 MG/1; MG/1
1 TABLET, FILM COATED ORAL 2 TIMES DAILY
Status: DISCONTINUED | OUTPATIENT
Start: 2023-07-26 | End: 2023-08-06 | Stop reason: HOSPADM

## 2023-07-26 RX ORDER — ASPIRIN 81 MG/1
81 TABLET ORAL DAILY
Status: DISCONTINUED | OUTPATIENT
Start: 2023-07-26 | End: 2023-07-26

## 2023-07-26 RX ORDER — ONDANSETRON 2 MG/ML
4 INJECTION INTRAMUSCULAR; INTRAVENOUS EVERY 6 HOURS PRN
Status: DISCONTINUED | OUTPATIENT
Start: 2023-07-26 | End: 2023-08-06 | Stop reason: HOSPADM

## 2023-07-26 RX ORDER — CHLORHEXIDINE GLUCONATE 4 G/100ML
SOLUTION TOPICAL SEE ADMIN INSTRUCTIONS
Status: DISCONTINUED | OUTPATIENT
Start: 2023-07-26 | End: 2023-07-26 | Stop reason: HOSPADM

## 2023-07-26 RX ORDER — MAGNESIUM SULFATE IN WATER 40 MG/ML
2000 INJECTION, SOLUTION INTRAVENOUS PRN
Status: DISCONTINUED | OUTPATIENT
Start: 2023-07-26 | End: 2023-08-06 | Stop reason: HOSPADM

## 2023-07-26 RX ORDER — PROTAMINE SULFATE 10 MG/ML
INJECTION, SOLUTION INTRAVENOUS PRN
Status: DISCONTINUED | OUTPATIENT
Start: 2023-07-26 | End: 2023-07-26 | Stop reason: SDUPTHER

## 2023-07-26 RX ORDER — AMIODARONE HYDROCHLORIDE 200 MG/1
200 TABLET ORAL 3 TIMES DAILY
Status: DISCONTINUED | OUTPATIENT
Start: 2023-07-26 | End: 2023-08-03

## 2023-07-26 RX ORDER — NITROGLYCERIN 20 MG/100ML
INJECTION INTRAVENOUS PRN
Status: DISCONTINUED | OUTPATIENT
Start: 2023-07-26 | End: 2023-07-26 | Stop reason: SDUPTHER

## 2023-07-26 RX ORDER — CHLORHEXIDINE GLUCONATE 0.12 MG/ML
15 RINSE ORAL ONCE
Status: COMPLETED | OUTPATIENT
Start: 2023-07-26 | End: 2023-07-26

## 2023-07-26 RX ORDER — SODIUM CHLORIDE 9 MG/ML
INJECTION, SOLUTION INTRAVENOUS PRN
Status: DISCONTINUED | OUTPATIENT
Start: 2023-07-26 | End: 2023-08-06 | Stop reason: HOSPADM

## 2023-07-26 RX ORDER — VASOPRESSIN 20 [USP'U]/ML
INJECTION, SOLUTION INTRAMUSCULAR; SUBCUTANEOUS PRN
Status: DISCONTINUED | OUTPATIENT
Start: 2023-07-26 | End: 2023-07-26 | Stop reason: SDUPTHER

## 2023-07-26 RX ORDER — NOREPINEPHRINE BITARTRATE 0.06 MG/ML
.01-.08 INJECTION, SOLUTION INTRAVENOUS CONTINUOUS PRN
Status: DISCONTINUED | OUTPATIENT
Start: 2023-07-26 | End: 2023-08-04

## 2023-07-26 RX ORDER — PROPOFOL 10 MG/ML
10 INJECTION, EMULSION INTRAVENOUS CONTINUOUS
Status: DISCONTINUED | OUTPATIENT
Start: 2023-07-26 | End: 2023-07-27

## 2023-07-26 RX ORDER — HYDRALAZINE HYDROCHLORIDE 20 MG/ML
5 INJECTION INTRAMUSCULAR; INTRAVENOUS EVERY 5 MIN PRN
Status: DISCONTINUED | OUTPATIENT
Start: 2023-07-26 | End: 2023-08-06 | Stop reason: HOSPADM

## 2023-07-26 RX ORDER — ONDANSETRON 4 MG/1
4 TABLET, ORALLY DISINTEGRATING ORAL EVERY 8 HOURS PRN
Status: DISCONTINUED | OUTPATIENT
Start: 2023-07-26 | End: 2023-08-06 | Stop reason: HOSPADM

## 2023-07-26 RX ORDER — PROTAMINE SULFATE 10 MG/ML
INJECTION, SOLUTION INTRAVENOUS
Status: COMPLETED
Start: 2023-07-26 | End: 2023-07-26

## 2023-07-26 RX ORDER — ALBUMIN, HUMAN INJ 5% 5 %
SOLUTION INTRAVENOUS
Status: COMPLETED
Start: 2023-07-26 | End: 2023-07-26

## 2023-07-26 RX ORDER — DIPHENHYDRAMINE HCL 25 MG
25 TABLET ORAL NIGHTLY PRN
Status: DISCONTINUED | OUTPATIENT
Start: 2023-07-27 | End: 2023-08-06 | Stop reason: HOSPADM

## 2023-07-26 RX ORDER — VANCOMYCIN HYDROCHLORIDE 1 G/20ML
INJECTION, POWDER, LYOPHILIZED, FOR SOLUTION INTRAVENOUS
Status: COMPLETED
Start: 2023-07-26 | End: 2023-07-26

## 2023-07-26 RX ORDER — SODIUM CHLORIDE 9 MG/ML
INJECTION, SOLUTION INTRAVENOUS CONTINUOUS PRN
Status: DISCONTINUED | OUTPATIENT
Start: 2023-07-26 | End: 2023-07-26 | Stop reason: SDUPTHER

## 2023-07-26 RX ORDER — VANCOMYCIN HYDROCHLORIDE 1 G/20ML
INJECTION, POWDER, LYOPHILIZED, FOR SOLUTION INTRAVENOUS PRN
Status: DISCONTINUED | OUTPATIENT
Start: 2023-07-26 | End: 2023-07-26 | Stop reason: SDUPTHER

## 2023-07-26 RX ORDER — POTASSIUM CHLORIDE 20 MEQ/1
40 TABLET, EXTENDED RELEASE ORAL PRN
Status: DISCONTINUED | OUTPATIENT
Start: 2023-07-26 | End: 2023-08-06 | Stop reason: HOSPADM

## 2023-07-26 RX ORDER — LIDOCAINE HYDROCHLORIDE 10 MG/ML
INJECTION, SOLUTION EPIDURAL; INFILTRATION; INTRACAUDAL; PERINEURAL PRN
Status: DISCONTINUED | OUTPATIENT
Start: 2023-07-26 | End: 2023-07-26 | Stop reason: SDUPTHER

## 2023-07-26 RX ORDER — POTASSIUM CHLORIDE 7.45 MG/ML
10 INJECTION INTRAVENOUS PRN
Status: DISCONTINUED | OUTPATIENT
Start: 2023-07-26 | End: 2023-08-06 | Stop reason: HOSPADM

## 2023-07-26 RX ORDER — PAPAVERINE HYDROCHLORIDE 30 MG/ML
INJECTION INTRAMUSCULAR; INTRAVENOUS
Status: DISCONTINUED
Start: 2023-07-26 | End: 2023-07-26

## 2023-07-26 RX ORDER — CLOPIDOGREL BISULFATE 75 MG/1
75 TABLET ORAL DAILY
Status: DISCONTINUED | OUTPATIENT
Start: 2023-07-27 | End: 2023-08-04

## 2023-07-26 RX ORDER — METOPROLOL TARTRATE 5 MG/5ML
2.5 INJECTION INTRAVENOUS EVERY 10 MIN PRN
Status: DISCONTINUED | OUTPATIENT
Start: 2023-07-26 | End: 2023-07-30

## 2023-07-26 RX ORDER — ALBUMIN (HUMAN) 12.5 G/50ML
25 SOLUTION INTRAVENOUS PRN
Status: DISCONTINUED | OUTPATIENT
Start: 2023-07-26 | End: 2023-08-06 | Stop reason: HOSPADM

## 2023-07-26 RX ORDER — IPRATROPIUM BROMIDE AND ALBUTEROL SULFATE 2.5; .5 MG/3ML; MG/3ML
1 SOLUTION RESPIRATORY (INHALATION) EVERY 4 HOURS PRN
Status: DISCONTINUED | OUTPATIENT
Start: 2023-07-26 | End: 2023-08-06 | Stop reason: HOSPADM

## 2023-07-26 RX ORDER — FENTANYL CITRATE 0.05 MG/ML
INJECTION, SOLUTION INTRAMUSCULAR; INTRAVENOUS PRN
Status: DISCONTINUED | OUTPATIENT
Start: 2023-07-26 | End: 2023-07-26 | Stop reason: SDUPTHER

## 2023-07-26 RX ORDER — PHENYLEPHRINE HCL IN 0.9% NACL 1 MG/10 ML
SYRINGE (ML) INTRAVENOUS PRN
Status: DISCONTINUED | OUTPATIENT
Start: 2023-07-26 | End: 2023-07-26 | Stop reason: SDUPTHER

## 2023-07-26 RX ORDER — CALCIUM CHLORIDE 100 MG/ML
INJECTION INTRAVENOUS; INTRAVENTRICULAR PRN
Status: DISCONTINUED | OUTPATIENT
Start: 2023-07-26 | End: 2023-07-26 | Stop reason: SDUPTHER

## 2023-07-26 RX ORDER — FENTANYL CITRATE 50 UG/ML
50 INJECTION, SOLUTION INTRAMUSCULAR; INTRAVENOUS
Status: DISCONTINUED | OUTPATIENT
Start: 2023-07-26 | End: 2023-07-27

## 2023-07-26 RX ORDER — ROCURONIUM BROMIDE 10 MG/ML
INJECTION, SOLUTION INTRAVENOUS PRN
Status: DISCONTINUED | OUTPATIENT
Start: 2023-07-26 | End: 2023-07-26 | Stop reason: SDUPTHER

## 2023-07-26 RX ORDER — BISACODYL 10 MG
10 SUPPOSITORY, RECTAL RECTAL DAILY PRN
Status: DISCONTINUED | OUTPATIENT
Start: 2023-07-26 | End: 2023-08-06 | Stop reason: HOSPADM

## 2023-07-26 RX ORDER — INSULIN LISPRO 100 [IU]/ML
0-3 INJECTION, SOLUTION INTRAVENOUS; SUBCUTANEOUS NIGHTLY
Status: DISCONTINUED | OUTPATIENT
Start: 2023-07-26 | End: 2023-08-06 | Stop reason: HOSPADM

## 2023-07-26 RX ORDER — TAMSULOSIN HYDROCHLORIDE 0.4 MG/1
0.4 CAPSULE ORAL DAILY
Status: DISCONTINUED | OUTPATIENT
Start: 2023-07-26 | End: 2023-08-06 | Stop reason: HOSPADM

## 2023-07-26 RX ORDER — INSULIN GLARGINE 100 [IU]/ML
0.15 INJECTION, SOLUTION SUBCUTANEOUS NIGHTLY
Status: DISCONTINUED | OUTPATIENT
Start: 2023-07-27 | End: 2023-08-06 | Stop reason: HOSPADM

## 2023-07-26 RX ORDER — SODIUM CHLORIDE 0.9 % (FLUSH) 0.9 %
5-40 SYRINGE (ML) INJECTION PRN
Status: DISCONTINUED | OUTPATIENT
Start: 2023-07-26 | End: 2023-08-06 | Stop reason: HOSPADM

## 2023-07-26 RX ORDER — HEPARIN SODIUM 1000 [USP'U]/ML
INJECTION, SOLUTION INTRAVENOUS; SUBCUTANEOUS
Status: DISCONTINUED
Start: 2023-07-26 | End: 2023-07-26

## 2023-07-26 RX ORDER — MAGNESIUM HYDROXIDE 1200 MG/15ML
LIQUID ORAL CONTINUOUS PRN
Status: COMPLETED | OUTPATIENT
Start: 2023-07-26 | End: 2023-07-26

## 2023-07-26 RX ORDER — INSULIN LISPRO 100 [IU]/ML
0-6 INJECTION, SOLUTION INTRAVENOUS; SUBCUTANEOUS
Status: DISCONTINUED | OUTPATIENT
Start: 2023-07-26 | End: 2023-08-06 | Stop reason: HOSPADM

## 2023-07-26 RX ORDER — POTASSIUM CHLORIDE 29.8 MG/ML
20 INJECTION INTRAVENOUS PRN
Status: DISCONTINUED | OUTPATIENT
Start: 2023-07-26 | End: 2023-08-06 | Stop reason: HOSPADM

## 2023-07-26 RX ORDER — PROPOFOL 10 MG/ML
INJECTION, EMULSION INTRAVENOUS
Status: COMPLETED
Start: 2023-07-26 | End: 2023-07-26

## 2023-07-26 RX ORDER — OXYCODONE HYDROCHLORIDE AND ACETAMINOPHEN 5; 325 MG/1; MG/1
1 TABLET ORAL EVERY 4 HOURS PRN
Status: DISCONTINUED | OUTPATIENT
Start: 2023-07-26 | End: 2023-08-06 | Stop reason: HOSPADM

## 2023-07-26 RX ORDER — ALBUMIN, HUMAN INJ 5% 5 %
25 SOLUTION INTRAVENOUS PRN
Status: DISCONTINUED | OUTPATIENT
Start: 2023-07-26 | End: 2023-08-06 | Stop reason: HOSPADM

## 2023-07-26 RX ORDER — POLYETHYLENE GLYCOL 3350 17 G/17G
17 POWDER, FOR SOLUTION ORAL DAILY
Status: DISCONTINUED | OUTPATIENT
Start: 2023-07-26 | End: 2023-08-06 | Stop reason: HOSPADM

## 2023-07-26 RX ORDER — SODIUM CHLORIDE 0.9 % (FLUSH) 0.9 %
5-40 SYRINGE (ML) INJECTION EVERY 12 HOURS SCHEDULED
Status: DISCONTINUED | OUTPATIENT
Start: 2023-07-26 | End: 2023-08-06 | Stop reason: HOSPADM

## 2023-07-26 RX ORDER — ASPIRIN 81 MG/1
81 TABLET, CHEWABLE ORAL DAILY
Status: DISCONTINUED | OUTPATIENT
Start: 2023-07-26 | End: 2023-08-06 | Stop reason: HOSPADM

## 2023-07-26 RX ORDER — MEPERIDINE HYDROCHLORIDE 50 MG/ML
25 INJECTION INTRAMUSCULAR; INTRAVENOUS; SUBCUTANEOUS
Status: DISCONTINUED | OUTPATIENT
Start: 2023-07-26 | End: 2023-07-27

## 2023-07-26 RX ORDER — SODIUM CHLORIDE 9 MG/ML
INJECTION, SOLUTION INTRAVENOUS PRN
Status: DISCONTINUED | OUTPATIENT
Start: 2023-07-26 | End: 2023-07-26

## 2023-07-26 RX ORDER — HEPARIN SODIUM 1000 [USP'U]/ML
INJECTION, SOLUTION INTRAVENOUS; SUBCUTANEOUS PRN
Status: DISCONTINUED | OUTPATIENT
Start: 2023-07-26 | End: 2023-07-26 | Stop reason: SDUPTHER

## 2023-07-26 RX ORDER — GLYCOPYRROLATE 1 MG/5 ML
SYRINGE (ML) INTRAVENOUS PRN
Status: DISCONTINUED | OUTPATIENT
Start: 2023-07-26 | End: 2023-07-26 | Stop reason: SDUPTHER

## 2023-07-26 RX ADMIN — FENTANYL CITRATE 50 MCG: 50 INJECTION INTRAVENOUS at 10:47

## 2023-07-26 RX ADMIN — ALPRAZOLAM 0.5 MG: 0.5 TABLET ORAL at 01:55

## 2023-07-26 RX ADMIN — Medication 0.4 MG: at 13:17

## 2023-07-26 RX ADMIN — INSULIN HUMAN 1 UNITS: 100 INJECTION, SOLUTION PARENTERAL at 12:58

## 2023-07-26 RX ADMIN — POTASSIUM CHLORIDE 20 MEQ: 29.8 INJECTION, SOLUTION INTRAVENOUS at 15:37

## 2023-07-26 RX ADMIN — SENNOSIDES AND DOCUSATE SODIUM 1 TABLET: 50; 8.6 TABLET ORAL at 20:56

## 2023-07-26 RX ADMIN — SODIUM BICARBONATE 50 MEQ: 84 INJECTION, SOLUTION INTRAVENOUS at 15:15

## 2023-07-26 RX ADMIN — CHLORHEXIDINE GLUCONATE 15 ML: 1.2 SOLUTION ORAL at 08:11

## 2023-07-26 RX ADMIN — SODIUM CHLORIDE, PRESERVATIVE FREE 10 ML: 5 INJECTION INTRAVENOUS at 22:00

## 2023-07-26 RX ADMIN — VANCOMYCIN HYDROCHLORIDE 1000 MG: 1 INJECTION, POWDER, LYOPHILIZED, FOR SOLUTION INTRAVENOUS at 23:03

## 2023-07-26 RX ADMIN — ALBUMIN (HUMAN) 500 ML: 12.5 INJECTION, SOLUTION INTRAVENOUS at 14:32

## 2023-07-26 RX ADMIN — HEPARIN SODIUM 5000 UNITS: 1000 INJECTION, SOLUTION INTRAVENOUS; SUBCUTANEOUS at 11:21

## 2023-07-26 RX ADMIN — HEPARIN SODIUM 5000 UNITS: 1000 INJECTION, SOLUTION INTRAVENOUS; SUBCUTANEOUS at 10:36

## 2023-07-26 RX ADMIN — ALBUMIN (HUMAN) 500 ML: 12.5 INJECTION, SOLUTION INTRAVENOUS at 13:28

## 2023-07-26 RX ADMIN — Medication 100 MCG: at 09:26

## 2023-07-26 RX ADMIN — FENTANYL CITRATE 100 MCG: 50 INJECTION INTRAVENOUS at 13:31

## 2023-07-26 RX ADMIN — AMINOCAPROIC ACID 1 G/HR: 250 INJECTION, SOLUTION INTRAVENOUS at 09:10

## 2023-07-26 RX ADMIN — Medication 50 MCG: at 11:14

## 2023-07-26 RX ADMIN — FENTANYL CITRATE 50 MCG: 50 INJECTION INTRAVENOUS at 10:15

## 2023-07-26 RX ADMIN — TAMSULOSIN HYDROCHLORIDE 0.4 MG: 0.4 CAPSULE ORAL at 17:27

## 2023-07-26 RX ADMIN — SODIUM CHLORIDE 1 UNITS/HR: 9 INJECTION, SOLUTION INTRAVENOUS at 12:58

## 2023-07-26 RX ADMIN — VASOPRESSIN 0.02 UNITS/MIN: 20 INJECTION, SOLUTION INTRAVENOUS at 14:19

## 2023-07-26 RX ADMIN — VASOPRESSIN 0.5 UNITS: 20 INJECTION, SOLUTION INTRAVENOUS at 14:18

## 2023-07-26 RX ADMIN — SODIUM CHLORIDE: 9 INJECTION, SOLUTION INTRAVENOUS at 09:10

## 2023-07-26 RX ADMIN — FENTANYL CITRATE 100 MCG: 50 INJECTION INTRAVENOUS at 09:52

## 2023-07-26 RX ADMIN — FENTANYL CITRATE 100 MCG: 50 INJECTION INTRAVENOUS at 10:07

## 2023-07-26 RX ADMIN — ASPIRIN 81 MG 81 MG: 81 TABLET ORAL at 17:27

## 2023-07-26 RX ADMIN — ROCURONIUM BROMIDE 30 MG: 10 INJECTION INTRAVENOUS at 12:41

## 2023-07-26 RX ADMIN — FENTANYL CITRATE 50 MCG: 50 INJECTION INTRAVENOUS at 09:56

## 2023-07-26 RX ADMIN — Medication 100 MCG: at 10:50

## 2023-07-26 RX ADMIN — VANCOMYCIN HYDROCHLORIDE 1000 MG: 1 INJECTION, POWDER, LYOPHILIZED, FOR SOLUTION INTRAVENOUS at 09:25

## 2023-07-26 RX ADMIN — FENTANYL CITRATE 50 MCG: 50 INJECTION, SOLUTION INTRAMUSCULAR; INTRAVENOUS at 20:21

## 2023-07-26 RX ADMIN — FENTANYL CITRATE 50 MCG: 50 INJECTION INTRAVENOUS at 10:25

## 2023-07-26 RX ADMIN — CALCIUM CHLORIDE 0.3 G: 100 INJECTION INTRAVENOUS; INTRAVENTRICULAR at 14:30

## 2023-07-26 RX ADMIN — SODIUM BICARBONATE 50 MEQ: 84 INJECTION, SOLUTION INTRAVENOUS at 15:20

## 2023-07-26 RX ADMIN — MIDAZOLAM 2 MG: 1 INJECTION INTRAMUSCULAR; INTRAVENOUS at 12:40

## 2023-07-26 RX ADMIN — ALBUMIN (HUMAN) 25 G: 12.5 INJECTION, SOLUTION INTRAVENOUS at 16:34

## 2023-07-26 RX ADMIN — AMIODARONE HYDROCHLORIDE 200 MG: 200 TABLET ORAL at 20:56

## 2023-07-26 RX ADMIN — ROCURONIUM BROMIDE 50 MG: 10 INJECTION INTRAVENOUS at 08:53

## 2023-07-26 RX ADMIN — FENTANYL CITRATE 50 MCG: 50 INJECTION INTRAVENOUS at 11:11

## 2023-07-26 RX ADMIN — PROPOFOL 15 MG: 10 INJECTION, EMULSION INTRAVENOUS at 14:40

## 2023-07-26 RX ADMIN — SODIUM CHLORIDE 0.01 MCG/KG/MIN: 9 INJECTION, SOLUTION INTRAVENOUS at 09:38

## 2023-07-26 RX ADMIN — Medication 50 MCG: at 10:28

## 2023-07-26 RX ADMIN — FENTANYL CITRATE 150 MCG: 50 INJECTION INTRAVENOUS at 09:18

## 2023-07-26 RX ADMIN — MIDAZOLAM 2 MG: 1 INJECTION INTRAMUSCULAR; INTRAVENOUS at 08:37

## 2023-07-26 RX ADMIN — SODIUM CHLORIDE: 9 INJECTION, SOLUTION INTRAVENOUS at 19:48

## 2023-07-26 RX ADMIN — Medication 2000 MG: at 13:11

## 2023-07-26 RX ADMIN — Medication 100 MCG: at 09:42

## 2023-07-26 RX ADMIN — NITROGLYCERIN 50 MCG: 20 INJECTION INTRAVENOUS at 14:49

## 2023-07-26 RX ADMIN — HEPARIN SODIUM 25000 UNITS: 1000 INJECTION, SOLUTION INTRAVENOUS; SUBCUTANEOUS at 11:09

## 2023-07-26 RX ADMIN — FENTANYL CITRATE 100 MCG: 50 INJECTION INTRAVENOUS at 14:22

## 2023-07-26 RX ADMIN — PROPOFOL 10 MCG/KG/MIN: 10 INJECTION, EMULSION INTRAVENOUS at 18:51

## 2023-07-26 RX ADMIN — PROTAMINE SULFATE 20 MG: 10 INJECTION, SOLUTION INTRAVENOUS at 13:45

## 2023-07-26 RX ADMIN — CALCIUM CHLORIDE 0.4 G: 100 INJECTION INTRAVENOUS; INTRAVENTRICULAR at 14:00

## 2023-07-26 RX ADMIN — LIDOCAINE HYDROCHLORIDE 50 MG: 10 INJECTION, SOLUTION EPIDURAL; INFILTRATION; INTRACAUDAL; PERINEURAL at 08:43

## 2023-07-26 RX ADMIN — OXYCODONE HYDROCHLORIDE AND ACETAMINOPHEN 2 TABLET: 5; 325 TABLET ORAL at 20:56

## 2023-07-26 RX ADMIN — FENTANYL CITRATE 150 MCG: 50 INJECTION INTRAVENOUS at 14:46

## 2023-07-26 RX ADMIN — MUPIROCIN: 20 OINTMENT TOPICAL at 08:11

## 2023-07-26 RX ADMIN — FENTANYL CITRATE 50 MCG: 50 INJECTION INTRAVENOUS at 08:43

## 2023-07-26 RX ADMIN — Medication 2000 MG: at 09:11

## 2023-07-26 RX ADMIN — PROPOFOL 20 MCG/KG/MIN: 10 INJECTION, EMULSION INTRAVENOUS at 14:41

## 2023-07-26 RX ADMIN — PROPOFOL 150 MG: 10 INJECTION, EMULSION INTRAVENOUS at 08:43

## 2023-07-26 RX ADMIN — ASPIRIN 81 MG: 81 TABLET, COATED ORAL at 08:09

## 2023-07-26 RX ADMIN — PROTAMINE SULFATE 330 MG: 10 INJECTION, SOLUTION INTRAVENOUS at 13:46

## 2023-07-26 RX ADMIN — FENTANYL CITRATE 100 MCG: 50 INJECTION INTRAVENOUS at 10:11

## 2023-07-26 RX ADMIN — LIDOCAINE HYDROCHLORIDE 100 MG: 10 INJECTION, SOLUTION EPIDURAL; INFILTRATION; INTRACAUDAL; PERINEURAL at 11:10

## 2023-07-26 RX ADMIN — VASOPRESSIN 1 UNITS: 20 INJECTION, SOLUTION INTRAVENOUS at 13:37

## 2023-07-26 RX ADMIN — FENTANYL CITRATE 50 MCG: 50 INJECTION INTRAVENOUS at 11:03

## 2023-07-26 RX ADMIN — VASOPRESSIN 0.5 UNITS: 20 INJECTION, SOLUTION INTRAVENOUS at 13:57

## 2023-07-26 RX ADMIN — FENTANYL CITRATE 50 MCG: 50 INJECTION, SOLUTION INTRAMUSCULAR; INTRAVENOUS at 23:00

## 2023-07-26 RX ADMIN — Medication 2000 MG: at 21:50

## 2023-07-26 RX ADMIN — FENTANYL CITRATE 50 MCG: 50 INJECTION INTRAVENOUS at 13:46

## 2023-07-26 RX ADMIN — METOPROLOL TARTRATE 12.5 MG: 25 TABLET, FILM COATED ORAL at 08:10

## 2023-07-26 RX ADMIN — ALPRAZOLAM 0.5 MG: 0.5 TABLET ORAL at 08:10

## 2023-07-26 RX ADMIN — ALBUMIN (HUMAN) 25 G: 12.5 INJECTION, SOLUTION INTRAVENOUS at 15:55

## 2023-07-26 RX ADMIN — ALBUMIN (HUMAN) 25 G: 12.5 INJECTION, SOLUTION INTRAVENOUS at 23:36

## 2023-07-26 RX ADMIN — EPINEPHRINE 0.04 MCG/KG/MIN: 1 INJECTION PARENTERAL at 12:42

## 2023-07-26 RX ADMIN — ROCURONIUM BROMIDE 20 MG: 10 INJECTION INTRAVENOUS at 11:13

## 2023-07-26 RX ADMIN — ALBUMIN (HUMAN) 25 G: 12.5 INJECTION, SOLUTION INTRAVENOUS at 19:49

## 2023-07-26 RX ADMIN — FENTANYL CITRATE 50 MCG: 50 INJECTION INTRAVENOUS at 14:03

## 2023-07-26 RX ADMIN — VASOPRESSIN 1 UNITS: 20 INJECTION, SOLUTION INTRAVENOUS at 13:28

## 2023-07-26 RX ADMIN — Medication 50 MCG: at 11:28

## 2023-07-26 RX ADMIN — AMIODARONE HYDROCHLORIDE 200 MG: 200 TABLET ORAL at 17:27

## 2023-07-26 RX ADMIN — ROCURONIUM BROMIDE 50 MG: 10 INJECTION INTRAVENOUS at 08:43

## 2023-07-26 RX ADMIN — Medication 50 MCG: at 11:18

## 2023-07-26 RX ADMIN — MUPIROCIN: 20 OINTMENT TOPICAL at 23:00

## 2023-07-26 ASSESSMENT — PULMONARY FUNCTION TESTS
PIF_VALUE: 10
PIF_VALUE: 17
PIF_VALUE: 16

## 2023-07-26 ASSESSMENT — PAIN SCALES - GENERAL: PAINLEVEL_OUTOF10: 9

## 2023-07-26 NOTE — ANESTHESIA PROCEDURE NOTES
Procedure Performed: KATHERINE       Start Time:        End Time:        General Procedure Information  Diagnostic Indications for Echo:  assessment of valve function            Department of Anesthesiology  KATHERINE Report         Name:  Sebastián Kelley                                         Age:  80 y.o. MRN:  [id number]           Procedure (Scheduled):  KATHERINE  Requested by Surgeon: Nicky Scott  Performed by Dr. Jalil Flores: Transesophageal Echo    Today's Date: 7/26/2023    Patient seen and examined. History and Physical reviewed. Labs reviewed. KATHERINE:    Structures:    LA: Normal  RA: Normal  RV: Normal size and function  LV: Normal size and function. Estimated LVEF 55 %  LV apex: No LV apical thrombus identified  Aorta: Mild atheromatous disease Asc Aorta, arch and descending Aorta. IABP 1:1  Percardium: No pericardial effusion  MESAH: No appendage thrombus identified  Septum: No intracardiac shunt via color Doppler. Valves:    Mitral Valve: Structurally normal. mild central regurgitation is identified. Aortic Valve: The aortic valve is trileaflet and opens adequately. none stenosis is identified. none regurgitiation is identified. Tricuspid valve: Structurally normal. none regurgitation is identified. Pulmonary valve: Normal. No significant regurgitation  No valvular vegetations or thrombus identified. Summary:     1. A KATHERINE was performed without complications. 2. LVEF 55 % preop. 3. Pre-op Valvular abnormalities mild central MR  4. IABP at 1:1 without apparent Aortic dissection  5. Significant findings were communicated to CTS. Electronically signed by Steph Murphy MD on 7/26/2023 at 9:16 AM    Summary:     1. A postop KATHERINE was performed without complications. 2. LVEF 55 % postop. 3. Post-op Valvular abnormalities mild central MR  4. IABP at 1:1 without apparent Aortic dissection  5. Significant findings were communicated to CTS.     Electronically signed by Steph Murphy MD on

## 2023-07-26 NOTE — ANESTHESIA PRE PROCEDURE
Department of Anesthesiology  Preprocedure Note       Name:  Jeana Theodore   Age:  80 y.o.  :  1940                                          MRN:  [id number]         Date:  2023      Surgeon: Cj Sosa):  Eden Roth MD    Procedure: Procedure(s):  CABG CORONARY ARTERY BYPASS X3 ON PUMP, SWAN, KATHERINE    Medications prior to admission:   Prior to Admission medications    Medication Sig Start Date End Date Taking? Authorizing Provider   levoFLOXacin (LEVAQUIN) 750 MG tablet Take 1 tablet by mouth daily for 10 days 23  Belle Renteria, DO   glipiZIDE (GLUCOTROL) 5 MG tablet TAKE 1 TABLET BY MOUTH TWICE A DAY 23   Belle Renteria, DO   diclofenac sodium (VOLTAREN) 1 % GEL Apply 4 g topically 4 times daily 23   Belle Renteria, DO   metoprolol succinate (TOPROL XL) 100 MG extended release tablet TAKE 1 TABLET BY MOUTH DAILY 23   Belle Renteria, DO   lisinopril (PRINIVIL;ZESTRIL) 10 MG tablet TAKE 1 TABLET BY MOUTH DAILY 23   Belle Renteria, DO   pravastatin (PRAVACHOL) 80 MG tablet TAKE 1 TABLET BY MOUTH EVERY DAY 23   Belle Renteria, DO   metFORMIN (GLUCOPHAGE) 1000 MG tablet Take 1 tablet by mouth 2 times daily (with meals) 23   Belle Renteria, DO   ibuprofen (ADVIL;MOTRIN) 800 MG tablet Take 1 tablet by mouth every 8 hours as needed for Pain 23   Belle Renteria, DO   tamsulosin (FLOMAX) 0.4 MG capsule TAKE TWO CAPSULES BY MOUTH DAILY  Patient not taking: Reported on 2023   Belle Renteria, DO   ondansetron (ZOFRAN ODT) 4 MG disintegrating tablet Take 1 tablet by mouth every 8 hours as needed for Nausea  Patient not taking: Reported on 3/23/2022 10/15/20   Rafa Wooten MD   metFORMIN (GLUCOPHAGE) 500 MG tablet Take 1 tablet by mouth 2 times daily (with meals).   Patient not taking: Reported on 14   Cesar Bunch, DO   allopurinol (ZYLOPRIM) 300 MG tablet TAKE 1 TABLET BY MOUTH DAILY  Patient not taking: Reported on 3/23/2022 11/14/14

## 2023-07-26 NOTE — ANESTHESIA PROCEDURE NOTES
Central Venous Line:    A central venous line was placed using ultrasound guidance and surface landmarks, in the OR for the following indication(s):     Sterility preparation included the following: hand hygiene performed prior to procedure, maximum sterile barriers used and sterile technique used to drape from head to toe. The patient was placed in Trendelenburg position. The right internal jugular vein was prepped. The site was prepped with Betadine. A 9 Fr (size), 10 (length), introducer SLIC was placed. During the procedure, the following specific steps were taken: target vein identified, needle advanced into vein and blood aspirated and guidewire advanced into vein. Intravenous verification was obtained by venous blood return. Post insertion care included: all ports aspirated, all ports flushed easily, guidewire removed intact, Biopatch applied, line sutured in place and dressing applied. During the procedure the patient experienced: patient tolerated procedure well with no complications.       Anesthesia type: general..No  Staffing  Performed: Anesthesiologist   Anesthesiologist: Curry Bermudez MD  Preanesthetic Checklist  Completed: patient identified, IV checked, site marked, risks and benefits discussed, surgical/procedural consents, equipment checked, pre-op evaluation, timeout performed, anesthesia consent given, oxygen available and monitors applied/VS acknowledged

## 2023-07-26 NOTE — BRIEF OP NOTE
Brief Postoperative Note      Patient: Chuck Madrid  YOB: 1940  MRN: [id number]    Date of Procedure: 7/26/2023    Pre-Op Diagnosis Codes:     * CAD, multiple vessel [I25.10]    Post-Op Diagnosis: Same       Procedure(s):  CABG CORONARY ARTERY BYPASS X3 ON PUMP, KATHERINE    Surgeon(s):  Ashley Church MD    Assistant:  Surgical Assistant: Kady Aldana Assistant: Rafaela Cristina RN    Anesthesia: General    Estimated Blood Loss (mL): 364    Complications: None    Specimens:   * No specimens in log *    Implants:  Implant Name Type Inv.  Item Serial No.  Lot No. LRB No. Used Action   CLIP INT M L GRN TI TRNSVRS GRV CHEVRON SHP W/ PRECIS TIP - [id number]  CLIP INT M L GRN TI TRNSVRS GRV CHEVRON SHP W/ PRECIS TIP  [employer]  N/A 1 Implanted   CLIP LIG M TAYLOR TI HRT SHP WIRE HORZ 6 CLIPS PER PK - [id number]  CLIP LIG M TAYLOR TI HRT SHP WIRE HORZ 6 CLIPS PER PK  [employer]  N/A 1 Implanted   CLIP LIG M TAYLOR TI HRT SHP WIRE HORZ 6 CLIPS PER PK - [id number]  CLIP LIG M TAYLOR TI HRT SHP WIRE HORZ 6 CLIPS PER PK  [employer]  N/A 1 Implanted   CLIP INT SM WIDE WECK RED TI TRNSVRS GRV CHEVRON SHP W/ PERCIS TIP 24 PER PK - [id number]  CLIP INT SM WIDE WECK RED TI TRNSVRS GRV CHEVRON SHP W/ PERCIS TIP 24 PER PK  [employer]  N/A 1 Implanted   CLIP INT SM WIDE WECK RED TI TRNSVRS GRV CHEVRON SHP W/ PERCIS TIP 24 PER PK - [id number]  CLIP INT SM WIDE WECK RED TI TRNSVRS GRV CHEVRON SHP W/ PERCIS TIP 24 PER PK  [employer]  N/A 1 Implanted         Drains:   Chest Tube Right 1 (Active)       Chest Tube 2 (Active)       Chest Tube Left 3 (Active)       Urinary Catheter 07/26/23 Smith-Temperature (Active)       [REMOVED] Urinary Catheter 07/25/23 Smith (Removed)   $ Urethral catheter insertion $ Not inserted for procedure 07/25/23 0500   Catheter Indications Need for fluid volume management of the critically ill patient in a critical care setting 07/26/23 0800   Site Assessment No urethral

## 2023-07-26 NOTE — H&P
[facility]  Office: [address], DO, Ge Albarado, DO, Nicolas Schafer, DO, Carloslee Baez, DO, Eduard Koo MD, Mitzi Russell MD, Walker Brown MD, Dave Reyes MD,  Beatrice Cordero MD, Roby Gomez MD, Lopez Vernon, DO, Alisha Blanca MD,  Frank Graham MD, Beto Scott MD, Jan Ramesh DO, Faith James MD,  Андрей Nunn DO, Tucker Locke MD, Kay Sims MD, Gregorio Delgadillo MD, Osvaldo Colby MD,  Renny Rosas MD, Manisha East MD, Rosy Garnett DO, Angel Hilario MD,  Jarod Diez MD, Jodeen Kussmaul, CNP,  Alice Snowden, CNP, Gloria James, CNP, Poncho Rivera, CNP,  Mckayla Lao, [facility], Lupillo Crowley, CNP, Katlin Leal, CNP, Parveen Hernandez, CNP, Raj Bernal, CNP, Thomas Moore, CNP, Clarita Villarreal PA-C, Chiquis Quintero, CNS, Tiffanie Jamison, CNP, Alanna Hernandez, [address] [place] [address]    HISTORY AND PHYSICAL EXAMINATION            Date:   7/26/2023  Patient name:  All Sebastian  Date of admission:  7/25/2023  9:47 AM  MRN:   [id number]  Account:  [de-identified]  YOB: 1940  PCP:    Natalie Keita DO  Room:   [facility] RM/NONE  Code Status:    Full Code    Chief Complaint:     No chief complaint on file. History Obtained From:     patient    History of Present Illness:     All Sebastian is a 80 y.o. male presents to the emergency department for chest pain and dizziness. Found to be severely bradycardic with new EKG changes. After arriving at cardiac ICU patient had a syncopal episode and compressions were started by nursing staff. Patient was taken urgently to cardiac catheterization finding severe multivessel disease. Intra-aortic balloon pump was placed and patient was transferred to [facility] for urgent CABG evaluation.   Patient was awake and alert upon arrival, patient is planned for CABG on 7/26/2023, patient was anxious,
No

## 2023-07-26 NOTE — ANESTHESIA PROCEDURE NOTES
Airway  Date/Time: 7/26/2023 8:46 AM  Urgency: elective    Airway not difficult    General Information and Staff    Patient location during procedure: OR  Resident/CRNA: Ruth Vogt MD  Performed: resident/CRNA     Indications and Patient Condition  Indications for airway management: anesthesia  Spontaneous Ventilation: absent  Sedation level: deep  Preoxygenated: yes  Patient position: sniffing  Mask difficulty assessment: vent by bag mask + OA or adjuvant +/- NMBA    Final Airway Details  Final airway type: endotracheal airway      Successful airway: ETT  Cuffed: yes   Successful intubation technique: direct laryngoscopy  Facilitating devices/methods: intubating stylet  Endotracheal tube insertion site: oral  Blade: Rich  Blade size: #3  ETT size (mm): 8.0  Cormack-Lehane Classification: grade IIa - partial view of glottis  Placement verified by: chest auscultation and capnometry   Measured from: gums  ETT to gums (cm): 23  Number of attempts at approach: 1

## 2023-07-27 ENCOUNTER — APPOINTMENT (OUTPATIENT)
Dept: VASCULAR LAB | Age: 83
DRG: 235 | End: 2023-07-27
Attending: INTERNAL MEDICINE
Payer: MEDICARE

## 2023-07-27 ENCOUNTER — APPOINTMENT (OUTPATIENT)
Dept: GENERAL RADIOLOGY | Age: 83
DRG: 235 | End: 2023-07-27
Attending: INTERNAL MEDICINE
Payer: MEDICARE

## 2023-07-27 PROBLEM — I25.810 ARTERIOSCLEROSIS OF ARTERIAL CORONARY ARTERY BYPASS GRAFT: Status: ACTIVE | Noted: 2023-07-27

## 2023-07-27 LAB
ABO/RH: NORMAL
ANION GAP SERPL CALCULATED.3IONS-SCNC: 15 MMOL/L (ref 9–17)
ANION GAP SERPL CALCULATED.3IONS-SCNC: 19 MMOL/L (ref 9–17)
ANTIBODY SCREEN: NEGATIVE
ARM BAND NUMBER: NORMAL
BASOPHILS # BLD: 0 K/UL (ref 0–0.2)
BASOPHILS NFR BLD: 0 % (ref 0–2)
BLOOD BANK BLOOD PRODUCT EXPIRATION DATE: NORMAL
BLOOD BANK DISPENSE STATUS: NORMAL
BLOOD BANK ISBT PRODUCT BLOOD TYPE: 5100
BLOOD BANK PRODUCT CODE: NORMAL
BLOOD BANK SAMPLE EXPIRATION: NORMAL
BLOOD BANK UNIT TYPE AND RH: NORMAL
BPU ID: NORMAL
BUN SERPL-MCNC: 19 MG/DL (ref 8–23)
BUN SERPL-MCNC: 21 MG/DL (ref 8–23)
CA-I BLD-SCNC: 1.1 MMOL/L (ref 1.13–1.33)
CALCIUM SERPL-MCNC: 7.8 MG/DL (ref 8.6–10.4)
CALCIUM SERPL-MCNC: 8.4 MG/DL (ref 8.6–10.4)
CHLORIDE SERPL-SCNC: 106 MMOL/L (ref 98–107)
CHLORIDE SERPL-SCNC: 109 MMOL/L (ref 98–107)
CO2 SERPL-SCNC: 15 MMOL/L (ref 20–31)
CO2 SERPL-SCNC: 19 MMOL/L (ref 20–31)
COMPONENT: NORMAL
CREAT SERPL-MCNC: 1.2 MG/DL (ref 0.7–1.2)
CREAT SERPL-MCNC: 1.5 MG/DL (ref 0.7–1.2)
CROSSMATCH RESULT: NORMAL
EKG ATRIAL RATE: 101 BPM
EKG ATRIAL RATE: 65 BPM
EKG ATRIAL RATE: 81 BPM
EKG P AXIS: -14 DEGREES
EKG P AXIS: 45 DEGREES
EKG P-R INTERVAL: 190 MS
EKG P-R INTERVAL: 208 MS
EKG P-R INTERVAL: 232 MS
EKG Q-T INTERVAL: 370 MS
EKG Q-T INTERVAL: 414 MS
EKG Q-T INTERVAL: 446 MS
EKG QRS DURATION: 112 MS
EKG QRS DURATION: 132 MS
EKG QRS DURATION: 150 MS
EKG QTC CALCULATION (BAZETT): 463 MS
EKG QTC CALCULATION (BAZETT): 479 MS
EKG QTC CALCULATION (BAZETT): 480 MS
EKG R AXIS: -64 DEGREES
EKG R AXIS: -71 DEGREES
EKG R AXIS: 90 DEGREES
EKG T AXIS: 11 DEGREES
EKG T AXIS: 49 DEGREES
EKG T AXIS: 85 DEGREES
EKG VENTRICULAR RATE: 101 BPM
EKG VENTRICULAR RATE: 65 BPM
EKG VENTRICULAR RATE: 81 BPM
EOSINOPHIL # BLD: 0 K/UL (ref 0–0.4)
EOSINOPHILS RELATIVE PERCENT: 0 % (ref 1–4)
ERYTHROCYTE [DISTWIDTH] IN BLOOD BY AUTOMATED COUNT: 15.3 % (ref 11.8–14.4)
ERYTHROCYTE [DISTWIDTH] IN BLOOD BY AUTOMATED COUNT: 15.5 % (ref 11.8–14.4)
ERYTHROCYTE [DISTWIDTH] IN BLOOD BY AUTOMATED COUNT: 15.7 % (ref 11.8–14.4)
GFR SERPL CREATININE-BSD FRML MDRD: 46 ML/MIN/1.73M2
GFR SERPL CREATININE-BSD FRML MDRD: >60 ML/MIN/1.73M2
GLUCOSE SERPL-MCNC: 179 MG/DL (ref 70–99)
GLUCOSE SERPL-MCNC: 189 MG/DL (ref 70–99)
HCT VFR BLD AUTO: 20.6 % (ref 40.7–50.3)
HCT VFR BLD AUTO: 27.4 % (ref 40.7–50.3)
HCT VFR BLD AUTO: 27.6 % (ref 40.7–50.3)
HCT VFR BLD AUTO: 30.1 % (ref 40.7–50.3)
HGB BLD-MCNC: 6.6 G/DL (ref 13–17)
HGB BLD-MCNC: 8.9 G/DL (ref 13–17)
HGB BLD-MCNC: 9.2 G/DL (ref 13–17)
HGB BLD-MCNC: 9.4 G/DL (ref 13–17)
IMM GRANULOCYTES # BLD AUTO: 0 K/UL (ref 0–0.3)
IMM GRANULOCYTES NFR BLD: 0 %
INR PPP: 1.6
LYMPHOCYTES NFR BLD: 0.95 K/UL (ref 1–4.8)
LYMPHOCYTES RELATIVE PERCENT: 8 % (ref 24–44)
MAGNESIUM SERPL-MCNC: 2 MG/DL (ref 1.6–2.6)
MAGNESIUM SERPL-MCNC: 2.1 MG/DL (ref 1.6–2.6)
MCH RBC QN AUTO: 30.1 PG (ref 25.2–33.5)
MCH RBC QN AUTO: 30.8 PG (ref 25.2–33.5)
MCH RBC QN AUTO: 31 PG (ref 25.2–33.5)
MCHC RBC AUTO-ENTMCNC: 31.2 G/DL (ref 28.4–34.8)
MCHC RBC AUTO-ENTMCNC: 32 G/DL (ref 28.4–34.8)
MCHC RBC AUTO-ENTMCNC: 32.5 G/DL (ref 28.4–34.8)
MCV RBC AUTO: 94.1 FL (ref 82.6–102.9)
MCV RBC AUTO: 94.8 FL (ref 82.6–102.9)
MCV RBC AUTO: 99.3 FL (ref 82.6–102.9)
METER GLUCOSE: 103 MG/DL (ref 75–110)
METER GLUCOSE: 103 MG/DL (ref 75–110)
METER GLUCOSE: 114 MG/DL (ref 75–110)
METER GLUCOSE: 129 MG/DL (ref 75–110)
METER GLUCOSE: 133 MG/DL (ref 75–110)
METER GLUCOSE: 139 MG/DL (ref 75–110)
METER GLUCOSE: 153 MG/DL (ref 75–110)
METER GLUCOSE: 158 MG/DL (ref 75–110)
METER GLUCOSE: 162 MG/DL (ref 75–110)
METER GLUCOSE: 164 MG/DL (ref 75–110)
METER GLUCOSE: 175 MG/DL (ref 75–110)
METER GLUCOSE: 185 MG/DL (ref 75–110)
METER GLUCOSE: 71 MG/DL (ref 75–110)
METER GLUCOSE: 84 MG/DL (ref 75–110)
METER GLUCOSE: 85 MG/DL (ref 75–110)
METER GLUCOSE: 87 MG/DL (ref 75–110)
METER GLUCOSE: 91 MG/DL (ref 75–110)
MICROORGANISM SPEC CULT: NO GROWTH
MONOCYTES NFR BLD: 17 % (ref 1–7)
MONOCYTES NFR BLD: 2.02 K/UL (ref 0.1–0.8)
MORPHOLOGY: ABNORMAL
NEUTROPHILS NFR BLD: 75 % (ref 36–66)
NEUTS SEG NFR BLD: 8.93 K/UL (ref 1.8–7.7)
NRBC BLD-RTO: 0 PER 100 WBC
PLATELET # BLD AUTO: 102 K/UL (ref 138–453)
PLATELET # BLD AUTO: 111 K/UL (ref 138–453)
PLATELET # BLD AUTO: 122 K/UL (ref 138–453)
PMV BLD AUTO: 10 FL (ref 8.1–13.5)
PMV BLD AUTO: 9.7 FL (ref 8.1–13.5)
PMV BLD AUTO: 9.7 FL (ref 8.1–13.5)
POTASSIUM SERPL-SCNC: 3.9 MMOL/L (ref 3.7–5.3)
POTASSIUM SERPL-SCNC: 4.3 MMOL/L (ref 3.7–5.3)
PROTHROMBIN TIME: 18.4 SEC (ref 11.7–14.9)
RBC # BLD AUTO: 2.19 M/UL (ref 4.21–5.77)
RBC # BLD AUTO: 2.89 M/UL (ref 4.21–5.77)
RBC # BLD AUTO: 3.03 M/UL (ref 4.21–5.77)
REASON FOR REJECTION: NORMAL
SODIUM SERPL-SCNC: 140 MMOL/L (ref 135–144)
SODIUM SERPL-SCNC: 143 MMOL/L (ref 135–144)
SPECIMEN DESCRIPTION: NORMAL
SPECIMEN SOURCE: NORMAL
TRANSFUSION STATUS: NORMAL
UNIT DIVISION: 0
UNIT ISSUE DATE/TIME: NORMAL
VAS RIGHT CFA PROX PSV: 169 CM/S
VAS RIGHT PFA PROX PSV: 119 CM/S
VAS RIGHT SFA PROX PSV: 153 CM/S
VAS RIGHT SFA PROX VEL RATIO: 0.9
WBC OTHER # BLD: 11.9 K/UL (ref 3.5–11.3)
WBC OTHER # BLD: 7.2 K/UL (ref 3.5–11.3)
WBC OTHER # BLD: 9.4 K/UL (ref 3.5–11.3)
ZZ NTE CLEAN UP: ORDERED TEST: NORMAL

## 2023-07-27 PROCEDURE — 2700000000 HC OXYGEN THERAPY PER DAY

## 2023-07-27 PROCEDURE — 93005 ELECTROCARDIOGRAM TRACING: CPT | Performed by: THORACIC SURGERY (CARDIOTHORACIC VASCULAR SURGERY)

## 2023-07-27 PROCEDURE — 71045 X-RAY EXAM CHEST 1 VIEW: CPT

## 2023-07-27 PROCEDURE — 94761 N-INVAS EAR/PLS OXIMETRY MLT: CPT

## 2023-07-27 PROCEDURE — 2580000003 HC RX 258: Performed by: PHYSICIAN ASSISTANT

## 2023-07-27 PROCEDURE — 85014 HEMATOCRIT: CPT

## 2023-07-27 PROCEDURE — 83735 ASSAY OF MAGNESIUM: CPT

## 2023-07-27 PROCEDURE — 93010 ELECTROCARDIOGRAM REPORT: CPT | Performed by: INTERNAL MEDICINE

## 2023-07-27 PROCEDURE — 36430 TRANSFUSION BLD/BLD COMPNT: CPT

## 2023-07-27 PROCEDURE — 99233 SBSQ HOSP IP/OBS HIGH 50: CPT | Performed by: INTERNAL MEDICINE

## 2023-07-27 PROCEDURE — 85018 HEMOGLOBIN: CPT

## 2023-07-27 PROCEDURE — 82947 ASSAY GLUCOSE BLOOD QUANT: CPT

## 2023-07-27 PROCEDURE — P9041 ALBUMIN (HUMAN),5%, 50ML: HCPCS | Performed by: PHYSICIAN ASSISTANT

## 2023-07-27 PROCEDURE — 6360000002 HC RX W HCPCS: Performed by: THORACIC SURGERY (CARDIOTHORACIC VASCULAR SURGERY)

## 2023-07-27 PROCEDURE — 2500000003 HC RX 250 WO HCPCS: Performed by: PHYSICIAN ASSISTANT

## 2023-07-27 PROCEDURE — 93926 LOWER EXTREMITY STUDY: CPT

## 2023-07-27 PROCEDURE — 6360000002 HC RX W HCPCS: Performed by: PHYSICIAN ASSISTANT

## 2023-07-27 PROCEDURE — 6370000000 HC RX 637 (ALT 250 FOR IP): Performed by: PHYSICIAN ASSISTANT

## 2023-07-27 PROCEDURE — 36415 COLL VENOUS BLD VENIPUNCTURE: CPT

## 2023-07-27 PROCEDURE — 6370000000 HC RX 637 (ALT 250 FOR IP): Performed by: THORACIC SURGERY (CARDIOTHORACIC VASCULAR SURGERY)

## 2023-07-27 PROCEDURE — 85027 COMPLETE CBC AUTOMATED: CPT

## 2023-07-27 PROCEDURE — 82330 ASSAY OF CALCIUM: CPT

## 2023-07-27 PROCEDURE — 80048 BASIC METABOLIC PNL TOTAL CA: CPT

## 2023-07-27 PROCEDURE — 85610 PROTHROMBIN TIME: CPT

## 2023-07-27 PROCEDURE — 2100000001 HC CVICU R&B

## 2023-07-27 RX ORDER — POTASSIUM CHLORIDE 20 MEQ/1
10 TABLET, EXTENDED RELEASE ORAL 2 TIMES DAILY WITH MEALS
Status: DISCONTINUED | OUTPATIENT
Start: 2023-07-27 | End: 2023-08-01

## 2023-07-27 RX ORDER — LIDOCAINE 4 G/G
1 PATCH TOPICAL DAILY PRN
Status: DISCONTINUED | OUTPATIENT
Start: 2023-07-27 | End: 2023-08-06 | Stop reason: HOSPADM

## 2023-07-27 RX ORDER — CHOLECALCIFEROL (VITAMIN D3) 125 MCG
5 CAPSULE ORAL ONCE
Status: COMPLETED | OUTPATIENT
Start: 2023-07-27 | End: 2023-07-27

## 2023-07-27 RX ORDER — MAGNESIUM SULFATE 1 G/100ML
1000 INJECTION INTRAVENOUS ONCE
Status: COMPLETED | OUTPATIENT
Start: 2023-07-27 | End: 2023-07-27

## 2023-07-27 RX ORDER — SODIUM CHLORIDE 9 MG/ML
INJECTION, SOLUTION INTRAVENOUS PRN
Status: DISCONTINUED | OUTPATIENT
Start: 2023-07-27 | End: 2023-08-06 | Stop reason: HOSPADM

## 2023-07-27 RX ORDER — ALPRAZOLAM 0.5 MG/1
0.5 TABLET ORAL ONCE
Status: COMPLETED | OUTPATIENT
Start: 2023-07-27 | End: 2023-07-27

## 2023-07-27 RX ORDER — FUROSEMIDE 10 MG/ML
20 INJECTION INTRAMUSCULAR; INTRAVENOUS 2 TIMES DAILY
Status: DISCONTINUED | OUTPATIENT
Start: 2023-07-27 | End: 2023-08-01

## 2023-07-27 RX ORDER — LIDOCAINE 4 G/G
1 PATCH TOPICAL DAILY
Status: DISCONTINUED | OUTPATIENT
Start: 2023-07-27 | End: 2023-07-27

## 2023-07-27 RX ORDER — FUROSEMIDE 10 MG/ML
20 INJECTION INTRAMUSCULAR; INTRAVENOUS ONCE
Status: COMPLETED | OUTPATIENT
Start: 2023-07-27 | End: 2023-07-27

## 2023-07-27 RX ADMIN — Medication 2000 MG: at 05:30

## 2023-07-27 RX ADMIN — POLYETHYLENE GLYCOL 3350 17 G: 17 POWDER, FOR SOLUTION ORAL at 07:45

## 2023-07-27 RX ADMIN — FENTANYL CITRATE 50 MCG: 50 INJECTION, SOLUTION INTRAMUSCULAR; INTRAVENOUS at 00:00

## 2023-07-27 RX ADMIN — OXYCODONE HYDROCHLORIDE AND ACETAMINOPHEN 2 TABLET: 5; 325 TABLET ORAL at 14:17

## 2023-07-27 RX ADMIN — VANCOMYCIN HYDROCHLORIDE 1000 MG: 1 INJECTION, POWDER, LYOPHILIZED, FOR SOLUTION INTRAVENOUS at 21:21

## 2023-07-27 RX ADMIN — SENNOSIDES AND DOCUSATE SODIUM 1 TABLET: 50; 8.6 TABLET ORAL at 20:11

## 2023-07-27 RX ADMIN — POTASSIUM CHLORIDE 10 MEQ: 1500 TABLET, EXTENDED RELEASE ORAL at 11:51

## 2023-07-27 RX ADMIN — CALCIUM CHLORIDE 1000 MG: 100 INJECTION, SOLUTION INTRAVENOUS at 06:37

## 2023-07-27 RX ADMIN — ATORVASTATIN CALCIUM 20 MG: 40 TABLET, FILM COATED ORAL at 20:11

## 2023-07-27 RX ADMIN — ALPRAZOLAM 0.5 MG: 0.5 TABLET ORAL at 08:31

## 2023-07-27 RX ADMIN — MUPIROCIN: 20 OINTMENT TOPICAL at 07:44

## 2023-07-27 RX ADMIN — SENNOSIDES AND DOCUSATE SODIUM 1 TABLET: 50; 8.6 TABLET ORAL at 07:44

## 2023-07-27 RX ADMIN — CLOPIDOGREL BISULFATE 75 MG: 75 TABLET ORAL at 07:44

## 2023-07-27 RX ADMIN — METOPROLOL TARTRATE 12.5 MG: 25 TABLET, FILM COATED ORAL at 07:44

## 2023-07-27 RX ADMIN — OXYCODONE HYDROCHLORIDE AND ACETAMINOPHEN 2 TABLET: 5; 325 TABLET ORAL at 09:55

## 2023-07-27 RX ADMIN — MAGNESIUM SULFATE HEPTAHYDRATE 1000 MG: 1 INJECTION, SOLUTION INTRAVENOUS at 18:48

## 2023-07-27 RX ADMIN — DIPHENHYDRAMINE HYDROCHLORIDE 25 MG: 25 TABLET ORAL at 00:55

## 2023-07-27 RX ADMIN — AMIODARONE HYDROCHLORIDE 200 MG: 200 TABLET ORAL at 07:45

## 2023-07-27 RX ADMIN — ALBUMIN (HUMAN) 25 G: 12.5 INJECTION, SOLUTION INTRAVENOUS at 01:51

## 2023-07-27 RX ADMIN — FUROSEMIDE 20 MG: 10 INJECTION, SOLUTION INTRAMUSCULAR; INTRAVENOUS at 05:00

## 2023-07-27 RX ADMIN — FUROSEMIDE 20 MG: 10 INJECTION, SOLUTION INTRAMUSCULAR; INTRAVENOUS at 11:51

## 2023-07-27 RX ADMIN — FENTANYL CITRATE 50 MCG: 50 INJECTION, SOLUTION INTRAMUSCULAR; INTRAVENOUS at 12:37

## 2023-07-27 RX ADMIN — FENTANYL CITRATE 25 MCG: 50 INJECTION, SOLUTION INTRAMUSCULAR; INTRAVENOUS at 23:52

## 2023-07-27 RX ADMIN — FENTANYL CITRATE 50 MCG: 50 INJECTION, SOLUTION INTRAMUSCULAR; INTRAVENOUS at 11:21

## 2023-07-27 RX ADMIN — VANCOMYCIN HYDROCHLORIDE 1000 MG: 1 INJECTION, POWDER, LYOPHILIZED, FOR SOLUTION INTRAVENOUS at 10:27

## 2023-07-27 RX ADMIN — Medication 2000 MG: at 20:11

## 2023-07-27 RX ADMIN — FUROSEMIDE 20 MG: 10 INJECTION, SOLUTION INTRAMUSCULAR; INTRAVENOUS at 16:56

## 2023-07-27 RX ADMIN — INSULIN LISPRO 1 UNITS: 100 INJECTION, SOLUTION INTRAVENOUS; SUBCUTANEOUS at 20:11

## 2023-07-27 RX ADMIN — VASOPRESSIN 0.01 UNITS/MIN: 20 INJECTION, SOLUTION INTRAVENOUS at 19:53

## 2023-07-27 RX ADMIN — TAMSULOSIN HYDROCHLORIDE 0.4 MG: 0.4 CAPSULE ORAL at 07:44

## 2023-07-27 RX ADMIN — SODIUM CHLORIDE, PRESERVATIVE FREE 10 ML: 5 INJECTION INTRAVENOUS at 20:13

## 2023-07-27 RX ADMIN — MUPIROCIN: 20 OINTMENT TOPICAL at 20:12

## 2023-07-27 RX ADMIN — AMIODARONE HYDROCHLORIDE 200 MG: 200 TABLET ORAL at 20:11

## 2023-07-27 RX ADMIN — MAGNESIUM SULFATE HEPTAHYDRATE 2000 MG: 40 INJECTION, SOLUTION INTRAVENOUS at 09:35

## 2023-07-27 RX ADMIN — AMIODARONE HYDROCHLORIDE 200 MG: 200 TABLET ORAL at 14:16

## 2023-07-27 RX ADMIN — POTASSIUM CHLORIDE 20 MEQ: 29.8 INJECTION, SOLUTION INTRAVENOUS at 08:22

## 2023-07-27 RX ADMIN — POTASSIUM CHLORIDE 10 MEQ: 1500 TABLET, EXTENDED RELEASE ORAL at 16:56

## 2023-07-27 RX ADMIN — VASOPRESSIN 0.01 UNITS/MIN: 20 INJECTION, SOLUTION INTRAVENOUS at 12:54

## 2023-07-27 RX ADMIN — Medication 5 MG: at 21:15

## 2023-07-27 RX ADMIN — HYDROMORPHONE HYDROCHLORIDE 0.5 MG: 1 INJECTION, SOLUTION INTRAMUSCULAR; INTRAVENOUS; SUBCUTANEOUS at 01:27

## 2023-07-27 RX ADMIN — Medication 2000 MG: at 13:19

## 2023-07-27 RX ADMIN — INSULIN LISPRO 1 UNITS: 100 INJECTION, SOLUTION INTRAVENOUS; SUBCUTANEOUS at 16:54

## 2023-07-27 RX ADMIN — OXYCODONE HYDROCHLORIDE AND ACETAMINOPHEN 2 TABLET: 5; 325 TABLET ORAL at 00:55

## 2023-07-27 RX ADMIN — ASPIRIN 81 MG 81 MG: 81 TABLET ORAL at 07:44

## 2023-07-27 RX ADMIN — OXYCODONE HYDROCHLORIDE AND ACETAMINOPHEN 2 TABLET: 5; 325 TABLET ORAL at 21:15

## 2023-07-27 RX ADMIN — FENTANYL CITRATE 50 MCG: 50 INJECTION, SOLUTION INTRAMUSCULAR; INTRAVENOUS at 07:25

## 2023-07-27 ASSESSMENT — PAIN DESCRIPTION - LOCATION
LOCATION: STERNUM
LOCATION: CHEST;BACK;NECK
LOCATION: STERNUM

## 2023-07-27 ASSESSMENT — PAIN DESCRIPTION - ORIENTATION: ORIENTATION: MID

## 2023-07-27 ASSESSMENT — PAIN SCALES - GENERAL
PAINLEVEL_OUTOF10: 6
PAINLEVEL_OUTOF10: 10
PAINLEVEL_OUTOF10: 8
PAINLEVEL_OUTOF10: 10
PAINLEVEL_OUTOF10: 9
PAINLEVEL_OUTOF10: 8

## 2023-07-27 ASSESSMENT — PAIN DESCRIPTION - DESCRIPTORS: DESCRIPTORS: ACHING;DISCOMFORT

## 2023-07-27 NOTE — ANESTHESIA POSTPROCEDURE EVALUATION
Department of Anesthesiology  Postprocedure Note    Patient: Alvarez Winston  MRN: [id number]  YOB: 1940  Date of evaluation: 7/27/2023      Procedure Summary     Date: 07/26/23 Room / Location: [address]    Anesthesia Start: 6997 Anesthesia Stop: 1519    Procedure: CABG CORONARY ARTERY BYPASS X3 ON PUMP, KATHERINE (Chest) Diagnosis:       CAD, multiple vessel      (CAD, multiple vessel [I25.10])    Surgeons: Aydee Gunn MD Responsible Provider: Francy Alexander MD    Anesthesia Type: general ASA Status: 4          Anesthesia Type: No value filed.     Marty Phase I: Marty Score: 2    Marty Phase II:        Anesthesia Post Evaluation    Patient location during evaluation: ICU  Patient participation: complete - patient cannot participate  Level of consciousness: sedated and ventilated  Pain score: 0  Airway patency: patent  Nausea & Vomiting: no nausea and no vomiting  Complications: no  Cardiovascular status: blood pressure returned to baseline  Respiratory status: intubated  Hydration status: euvolemic  Comments:   Mechanical Ventilation Data  SETTINGS (Comprehensive)  Vent Information  Ventilator Initiate: Yes  Ventilator Discontinue: Yes  Vent Mode: SIMV/PRVC  Additional Respiratory Assessments  Pulse: 74  Respirations: 12  SpO2: 96 %  End Tidal CO2: 36 (%)    ABG   No results found for: PH, PCO2, PO2, HCO3, O2SAT  Lab Results       Component                Value               Date/Time                  MODE                     SIMV(PRVC)+ PS      07/26/2023 04:35 PM

## 2023-07-27 NOTE — CARE COORDINATION
Case Management Assessment  Initial Evaluation    Date/Time of Evaluation: 7/27/2023 5:51 PM  Assessment Completed by: Grea Dailey RN    If patient is discharged prior to next notation, then this note serves as note for discharge by case management. Patient Name: Sebastián Kelley                   YOB: 1940  Diagnosis: CAD, multiple vessel [I25.10]  Multiple vessel coronary artery disease [I25.10]                   Date / Time: 7/25/2023  9:47 AM    Patient Admission Status: Inpatient   Readmission Risk (Low < 19, Mod (19-27), High > 27): Readmission Risk Score: 16.4    Current PCP: Yuki Morley, DO  PCP verified by CM? (P) Yes    Chart Reviewed: Yes      History Provided by: (P) Spouse  Patient Orientation: (P) Alert and Oriented    Patient Cognition: (P) Alert    Hospitalization in the last 30 days (Readmission):  Planned procedure. If yes, Readmission Assessment in CM Navigator will be completed.     Advance Directives:      Code Status: Full Code   Patient's Primary Decision Maker is: (P) Legal Next of Kin    Primary Decision MakerFayrene Prader  Spouse - [phone]    Discharge Planning:    Patient lives with: (P) Spouse/Significant Other Type of Home: (P) House  Primary Care Giver: (P) Self  Patient Support Systems include: (P) Spouse/Significant Other, Children, Family Members   Current Financial resources: (P) Medicare, Medicaid  Current community resources: (P) None  Current services prior to admission: (P) None            Current DME:              Type of Home Care services:       ADLS  Prior functional level: (P) Independent in ADLs/IADLs  Current functional level: (P) Assistance with the following:, Bathing, Dressing, Cooking, Housework, Shopping, Mobility    PT AM-PAC:   /24  OT AM-PAC:   /24    Family can provide assistance at DC: (P) Yes  Would you like Case Management to discuss the discharge plan with any other family members/significant others, and if so, who? (P) Yes

## 2023-07-27 NOTE — OP NOTE
[address] [facility], [facility] [facility]                                OPERATIVE REPORT    PATIENT NAME: Cristina Richmond                     :        1940  MED REC NO:   [id number]                             ROOM:       [id number]  ACCOUNT NO:   [de-identified]                           ADMIT DATE: 2023  PROVIDER:     Ashley Reina MD    DATE OF PROCEDURE:  2023    PREOPERATIVE DIAGNOSIS:  Multivessel coronary disease. POSTOPERATIVE DIAGNOSIS:  Multivessel coronary disease. PROCEDURES:  1. CABG x3, LIMA to LAD, saphenous vein graft to the RCA, saphenous  vein to circumflex. 2.  Cardiopulmonary bypass. 3.  Epiaortic ultrasound. 4.  KATHERINE. 5.  Bilateral endovein harvest.  6.  Platelet gel. SURGEON:  Ashley Reina MD    ASSISTANT:   Lieutenant Banks. EBL:  1 liter. SPECIMEN:  None. DRAINS:  Three mediastinal chest tubes. PROCEDURE:  The patient had the risks, benefits, and intentions fully  discussed. Risks include but not limited to bleeding, infection,  stroke, renal failure, damage to heart or lungs, possible death. He  signed consent and was taken back to the operating room and placed in  supine position. General anesthesia was induced. A-line, Smith,  central line, KATHERINE probe placed. He was washed, scrubbed, and draped in  normal sterile fashion. The patient had the incision made in midsternal  line and continued in depth with electrocautery. Hemostasis obtained. Sternum was opened using the oscillating sternal saw. The pericardium  was opened, heart was inspected. Rultract retractor was placed. Fascia  was taken down. LIMA was taken down off the chest wall. The LIMA was  cleaned of fibrofatty tissue and spatulated.   At this time, the patient  has the LIMA treated with intraarterial papaverine and placed in  papaverine-soaked Ray-Xiao in the left chest.    Simultaneously the greater saphenous vein

## 2023-07-27 NOTE — FLOWSHEET NOTE
07/27/23 1248   Treatment Team Notification   Reason for Communication Evaluate  (Notified on positional A-line)   Name of Team Member Notified Dr. Low [facility] Team Role Resident   Method of Communication Secure Message   Response No new orders   Notification Time [phone]

## 2023-07-27 NOTE — FLOWSHEET NOTE
07/27/23 1105   Treatment Team Notification   Reason for Communication Evaluate  (Notified Cardiology on low BP)   Name of Team Member Notified Dr. Francesca Zimmer Team Role Resident   Method of Communication Secure Message   Response See orders  (Epi started at 0.02)   Notification Time

## 2023-07-28 ENCOUNTER — APPOINTMENT (OUTPATIENT)
Dept: GENERAL RADIOLOGY | Age: 83
DRG: 235 | End: 2023-07-28
Attending: INTERNAL MEDICINE
Payer: MEDICARE

## 2023-07-28 LAB
ANION GAP SERPL CALCULATED.3IONS-SCNC: 12 MMOL/L (ref 9–17)
BASOPHILS # BLD: 0 K/UL (ref 0–0.2)
BASOPHILS NFR BLD: 0 % (ref 0–2)
BUN SERPL-MCNC: 24 MG/DL (ref 8–23)
CA-I BLD-SCNC: 1.19 MMOL/L (ref 1.13–1.33)
CALCIUM SERPL-MCNC: 8.8 MG/DL (ref 8.6–10.4)
CHLORIDE SERPL-SCNC: 105 MMOL/L (ref 98–107)
CO2 SERPL-SCNC: 19 MMOL/L (ref 20–31)
CREAT SERPL-MCNC: 1.9 MG/DL (ref 0.7–1.2)
ECHO AO ROOT DIAM: 2.9 CM
ECHO AV AREA PEAK VELOCITY: 1.8 CM2
ECHO AV AREA VTI: 1.8 CM2
ECHO AV MEAN GRADIENT: 10 MMHG
ECHO AV MEAN VELOCITY: 1.5 M/S
ECHO AV PEAK GRADIENT: 21 MMHG
ECHO AV PEAK VELOCITY: 2.3 M/S
ECHO AV VELOCITY RATIO: 0.52
ECHO AV VTI: 42.2 CM
ECHO LA AREA 2C: 15.4 CM2
ECHO LA AREA 4C: 16.9 CM2
ECHO LA DIAMETER: 2.7 CM
ECHO LA MAJOR AXIS: 5.7 CM
ECHO LA MINOR AXIS: 5.7 CM
ECHO LA TO AORTIC ROOT RATIO: 0.93
ECHO LA VOL 2C: 33 ML (ref 18–58)
ECHO LA VOL 4C: 39 ML (ref 18–58)
ECHO LA VOL BP: 35 ML (ref 18–58)
ECHO LV E' LATERAL VELOCITY: 8 CM/S
ECHO LV E' SEPTAL VELOCITY: 6 CM/S
ECHO LV EDV 3D: 265 ML
ECHO LV EF PHYSICIAN: 55 %
ECHO LV EJECTION FRACTION 3D: 53 %
ECHO LV ESV 3D: 125 ML
ECHO LV FRACTIONAL SHORTENING: 40 % (ref 28–44)
ECHO LV INTERNAL DIMENSION DIASTOLIC: 4.3 CM (ref 4.2–5.9)
ECHO LV INTERNAL DIMENSION SYSTOLIC: 2.6 CM
ECHO LV IVSD: 1 CM (ref 0.6–1)
ECHO LV MASS 2D: 132.7 G (ref 88–224)
ECHO LV MASS 3D: 254 G
ECHO LV POSTERIOR WALL DIASTOLIC: 0.9 CM (ref 0.6–1)
ECHO LV RELATIVE WALL THICKNESS RATIO: 0.42
ECHO LVOT AREA: 3.5 CM2
ECHO LVOT AV VTI INDEX: 0.51
ECHO LVOT DIAM: 2.1 CM
ECHO LVOT MEAN GRADIENT: 3 MMHG
ECHO LVOT PEAK GRADIENT: 5 MMHG
ECHO LVOT PEAK VELOCITY: 1.2 M/S
ECHO LVOT SV: 74.1 ML
ECHO LVOT VTI: 21.4 CM
ECHO MV A VELOCITY: 0.84 M/S
ECHO MV AREA VTI: 2.9 CM2
ECHO MV E DECELERATION TIME (DT): 217 MS
ECHO MV E VELOCITY: 0.69 M/S
ECHO MV E/A RATIO: 0.82
ECHO MV E/E' LATERAL: 8.63
ECHO MV E/E' RATIO (AVERAGED): 10.06
ECHO MV E/E' SEPTAL: 11.5
ECHO MV LVOT VTI INDEX: 1.2
ECHO MV MAX VELOCITY: 0.9 M/S
ECHO MV MEAN GRADIENT: 1 MMHG
ECHO MV MEAN VELOCITY: 0.5 M/S
ECHO MV PEAK GRADIENT: 3 MMHG
ECHO MV VTI: 25.6 CM
ECHO PV MAX VELOCITY: 1.6 M/S
ECHO PV PEAK GRADIENT: 10 MMHG
ECHO RV FREE WALL PEAK S': 13 CM/S
ECHO RV INTERNAL DIMENSION: 4.2 CM
ECHO RV TAPSE: 2.4 CM (ref 1.7–?)
EOSINOPHIL # BLD: 0 K/UL (ref 0–0.4)
EOSINOPHILS RELATIVE PERCENT: 0 % (ref 1–4)
ERYTHROCYTE [DISTWIDTH] IN BLOOD BY AUTOMATED COUNT: 15.2 % (ref 11.8–14.4)
ERYTHROCYTE [DISTWIDTH] IN BLOOD BY AUTOMATED COUNT: 15.4 % (ref 11.8–14.4)
ERYTHROCYTE [DISTWIDTH] IN BLOOD BY AUTOMATED COUNT: 15.4 % (ref 11.8–14.4)
ERYTHROCYTE [DISTWIDTH] IN BLOOD BY AUTOMATED COUNT: 15.6 % (ref 11.8–14.4)
GFR SERPL CREATININE-BSD FRML MDRD: 35 ML/MIN/1.73M2
GLUCOSE SERPL-MCNC: 184 MG/DL (ref 70–99)
HCT VFR BLD AUTO: 25.7 % (ref 40.7–50.3)
HCT VFR BLD AUTO: 27.3 % (ref 40.7–50.3)
HCT VFR BLD AUTO: 28 % (ref 40.7–50.3)
HCT VFR BLD AUTO: 28.5 % (ref 40.7–50.3)
HGB BLD-MCNC: 8.9 G/DL (ref 13–17)
HGB BLD-MCNC: 9 G/DL (ref 13–17)
HGB BLD-MCNC: 9.2 G/DL (ref 13–17)
HGB BLD-MCNC: 9.2 G/DL (ref 13–17)
IMM GRANULOCYTES # BLD AUTO: 0 K/UL (ref 0–0.3)
IMM GRANULOCYTES NFR BLD: 0 %
INR PPP: 1.5
LYMPHOCYTES NFR BLD: 1.06 K/UL (ref 1–4.8)
LYMPHOCYTES RELATIVE PERCENT: 12 % (ref 24–44)
MAGNESIUM SERPL-MCNC: 2.8 MG/DL (ref 1.6–2.6)
MCH RBC QN AUTO: 30.4 PG (ref 25.2–33.5)
MCH RBC QN AUTO: 30.9 PG (ref 25.2–33.5)
MCH RBC QN AUTO: 31.7 PG (ref 25.2–33.5)
MCH RBC QN AUTO: 31.7 PG (ref 25.2–33.5)
MCHC RBC AUTO-ENTMCNC: 31.8 G/DL (ref 28.4–34.8)
MCHC RBC AUTO-ENTMCNC: 32.3 G/DL (ref 28.4–34.8)
MCHC RBC AUTO-ENTMCNC: 33.7 G/DL (ref 28.4–34.8)
MCHC RBC AUTO-ENTMCNC: 35 G/DL (ref 28.4–34.8)
MCV RBC AUTO: 90.5 FL (ref 82.6–102.9)
MCV RBC AUTO: 91.6 FL (ref 82.6–102.9)
MCV RBC AUTO: 94.1 FL (ref 82.6–102.9)
MCV RBC AUTO: 99.6 FL (ref 82.6–102.9)
METER GLUCOSE: 163 MG/DL (ref 75–110)
METER GLUCOSE: 191 MG/DL (ref 75–110)
METER GLUCOSE: 211 MG/DL (ref 75–110)
METER GLUCOSE: 262 MG/DL (ref 75–110)
MONOCYTES NFR BLD: 0.35 K/UL (ref 0.1–0.8)
MONOCYTES NFR BLD: 4 % (ref 1–7)
MORPHOLOGY: ABNORMAL
NEUTROPHILS NFR BLD: 84 % (ref 36–66)
NEUTS SEG NFR BLD: 7.39 K/UL (ref 1.8–7.7)
NRBC BLD-RTO: 0 PER 100 WBC
PLATELET # BLD AUTO: 117 K/UL (ref 138–453)
PLATELET # BLD AUTO: 118 K/UL (ref 138–453)
PLATELET # BLD AUTO: ABNORMAL K/UL (ref 138–453)
PLATELET # BLD AUTO: ABNORMAL K/UL (ref 138–453)
PLATELET, FLUORESCENCE: 135 K/UL (ref 138–453)
PLATELET, FLUORESCENCE: ABNORMAL K/UL (ref 138–453)
PLATELETS.RETICULATED NFR BLD AUTO: 3.1 % (ref 1.1–10.3)
PMV BLD AUTO: 10.2 FL (ref 8.1–13.5)
PMV BLD AUTO: 10.6 FL (ref 8.1–13.5)
POTASSIUM SERPL-SCNC: 4.4 MMOL/L (ref 3.7–5.3)
PROTHROMBIN TIME: 18.1 SEC (ref 11.7–14.9)
RBC # BLD AUTO: 2.81 M/UL (ref 4.21–5.77)
RBC # BLD AUTO: 2.84 M/UL (ref 4.21–5.77)
RBC # BLD AUTO: 2.98 M/UL (ref 4.21–5.77)
RBC # BLD AUTO: 3.03 M/UL (ref 4.21–5.77)
RBC # BLD: ABNORMAL 10*6/UL
SODIUM SERPL-SCNC: 136 MMOL/L (ref 135–144)
WBC OTHER # BLD: 7.8 K/UL (ref 3.5–11.3)
WBC OTHER # BLD: 8.8 K/UL (ref 3.5–11.3)
WBC OTHER # BLD: 8.8 K/UL (ref 3.5–11.3)
WBC OTHER # BLD: 9.2 K/UL (ref 3.5–11.3)

## 2023-07-28 PROCEDURE — 6360000002 HC RX W HCPCS: Performed by: THORACIC SURGERY (CARDIOTHORACIC VASCULAR SURGERY)

## 2023-07-28 PROCEDURE — 85610 PROTHROMBIN TIME: CPT

## 2023-07-28 PROCEDURE — 97116 GAIT TRAINING THERAPY: CPT

## 2023-07-28 PROCEDURE — 2100000001 HC CVICU R&B

## 2023-07-28 PROCEDURE — 71045 X-RAY EXAM CHEST 1 VIEW: CPT

## 2023-07-28 PROCEDURE — 2580000003 HC RX 258: Performed by: PHYSICIAN ASSISTANT

## 2023-07-28 PROCEDURE — 97535 SELF CARE MNGMENT TRAINING: CPT

## 2023-07-28 PROCEDURE — 6370000000 HC RX 637 (ALT 250 FOR IP): Performed by: PHYSICIAN ASSISTANT

## 2023-07-28 PROCEDURE — 82947 ASSAY GLUCOSE BLOOD QUANT: CPT

## 2023-07-28 PROCEDURE — 85055 RETICULATED PLATELET ASSAY: CPT

## 2023-07-28 PROCEDURE — 6360000002 HC RX W HCPCS: Performed by: PHYSICIAN ASSISTANT

## 2023-07-28 PROCEDURE — 36415 COLL VENOUS BLD VENIPUNCTURE: CPT

## 2023-07-28 PROCEDURE — 82330 ASSAY OF CALCIUM: CPT

## 2023-07-28 PROCEDURE — 99233 SBSQ HOSP IP/OBS HIGH 50: CPT | Performed by: INTERNAL MEDICINE

## 2023-07-28 PROCEDURE — 80048 BASIC METABOLIC PNL TOTAL CA: CPT

## 2023-07-28 PROCEDURE — P9041 ALBUMIN (HUMAN),5%, 50ML: HCPCS | Performed by: PHYSICIAN ASSISTANT

## 2023-07-28 PROCEDURE — P9047 ALBUMIN (HUMAN), 25%, 50ML: HCPCS | Performed by: PHYSICIAN ASSISTANT

## 2023-07-28 PROCEDURE — 97166 OT EVAL MOD COMPLEX 45 MIN: CPT

## 2023-07-28 PROCEDURE — 6370000000 HC RX 637 (ALT 250 FOR IP): Performed by: THORACIC SURGERY (CARDIOTHORACIC VASCULAR SURGERY)

## 2023-07-28 PROCEDURE — 97530 THERAPEUTIC ACTIVITIES: CPT

## 2023-07-28 PROCEDURE — 85027 COMPLETE CBC AUTOMATED: CPT

## 2023-07-28 PROCEDURE — 97162 PT EVAL MOD COMPLEX 30 MIN: CPT

## 2023-07-28 PROCEDURE — 83735 ASSAY OF MAGNESIUM: CPT

## 2023-07-28 RX ORDER — ALBUMIN (HUMAN) 12.5 G/50ML
25 SOLUTION INTRAVENOUS ONCE
Status: COMPLETED | OUTPATIENT
Start: 2023-07-28 | End: 2023-07-28

## 2023-07-28 RX ADMIN — AMIODARONE HYDROCHLORIDE 200 MG: 200 TABLET ORAL at 14:33

## 2023-07-28 RX ADMIN — AMIODARONE HYDROCHLORIDE 200 MG: 200 TABLET ORAL at 07:43

## 2023-07-28 RX ADMIN — OXYCODONE HYDROCHLORIDE AND ACETAMINOPHEN 2 TABLET: 5; 325 TABLET ORAL at 05:41

## 2023-07-28 RX ADMIN — ATORVASTATIN CALCIUM 20 MG: 40 TABLET, FILM COATED ORAL at 20:22

## 2023-07-28 RX ADMIN — INSULIN LISPRO 1 UNITS: 100 INJECTION, SOLUTION INTRAVENOUS; SUBCUTANEOUS at 12:21

## 2023-07-28 RX ADMIN — MUPIROCIN: 20 OINTMENT TOPICAL at 07:44

## 2023-07-28 RX ADMIN — OXYCODONE HYDROCHLORIDE AND ACETAMINOPHEN 2 TABLET: 5; 325 TABLET ORAL at 14:33

## 2023-07-28 RX ADMIN — OXYCODONE HYDROCHLORIDE AND ACETAMINOPHEN 2 TABLET: 5; 325 TABLET ORAL at 20:22

## 2023-07-28 RX ADMIN — METOPROLOL TARTRATE 12.5 MG: 25 TABLET, FILM COATED ORAL at 07:49

## 2023-07-28 RX ADMIN — SENNOSIDES AND DOCUSATE SODIUM 1 TABLET: 50; 8.6 TABLET ORAL at 07:40

## 2023-07-28 RX ADMIN — INSULIN LISPRO 1 UNITS: 100 INJECTION, SOLUTION INTRAVENOUS; SUBCUTANEOUS at 20:26

## 2023-07-28 RX ADMIN — MUPIROCIN: 20 OINTMENT TOPICAL at 20:23

## 2023-07-28 RX ADMIN — ASPIRIN 81 MG 81 MG: 81 TABLET ORAL at 07:43

## 2023-07-28 RX ADMIN — SENNOSIDES AND DOCUSATE SODIUM 1 TABLET: 50; 8.6 TABLET ORAL at 20:22

## 2023-07-28 RX ADMIN — TAMSULOSIN HYDROCHLORIDE 0.4 MG: 0.4 CAPSULE ORAL at 07:44

## 2023-07-28 RX ADMIN — Medication 2000 MG: at 05:30

## 2023-07-28 RX ADMIN — FUROSEMIDE 20 MG: 10 INJECTION, SOLUTION INTRAMUSCULAR; INTRAVENOUS at 07:43

## 2023-07-28 RX ADMIN — METOPROLOL TARTRATE 12.5 MG: 25 TABLET, FILM COATED ORAL at 20:21

## 2023-07-28 RX ADMIN — OXYCODONE HYDROCHLORIDE AND ACETAMINOPHEN 2 TABLET: 5; 325 TABLET ORAL at 10:17

## 2023-07-28 RX ADMIN — ALBUMIN (HUMAN) 25 G: 0.25 INJECTION, SOLUTION INTRAVENOUS at 16:30

## 2023-07-28 RX ADMIN — AMIODARONE HYDROCHLORIDE 200 MG: 200 TABLET ORAL at 20:22

## 2023-07-28 RX ADMIN — SODIUM CHLORIDE, PRESERVATIVE FREE 10 ML: 5 INJECTION INTRAVENOUS at 20:27

## 2023-07-28 RX ADMIN — POTASSIUM CHLORIDE 10 MEQ: 1500 TABLET, EXTENDED RELEASE ORAL at 07:40

## 2023-07-28 RX ADMIN — INSULIN GLARGINE 10 UNITS: 100 INJECTION, SOLUTION SUBCUTANEOUS at 20:23

## 2023-07-28 RX ADMIN — OXYCODONE HYDROCHLORIDE AND ACETAMINOPHEN 2 TABLET: 5; 325 TABLET ORAL at 01:32

## 2023-07-28 RX ADMIN — DIPHENHYDRAMINE HYDROCHLORIDE 25 MG: 25 TABLET ORAL at 20:22

## 2023-07-28 RX ADMIN — FENTANYL CITRATE 25 MCG: 50 INJECTION, SOLUTION INTRAMUSCULAR; INTRAVENOUS at 07:34

## 2023-07-28 RX ADMIN — POLYETHYLENE GLYCOL 3350 17 G: 17 POWDER, FOR SOLUTION ORAL at 07:40

## 2023-07-28 RX ADMIN — ALBUMIN (HUMAN) 25 G: 12.5 INJECTION, SOLUTION INTRAVENOUS at 01:33

## 2023-07-28 RX ADMIN — INSULIN LISPRO 3 UNITS: 100 INJECTION, SOLUTION INTRAVENOUS; SUBCUTANEOUS at 17:03

## 2023-07-28 RX ADMIN — INSULIN LISPRO 1 UNITS: 100 INJECTION, SOLUTION INTRAVENOUS; SUBCUTANEOUS at 09:17

## 2023-07-28 ASSESSMENT — PAIN DESCRIPTION - LOCATION
LOCATION: STERNUM
LOCATION: GENERALIZED
LOCATION: STERNUM

## 2023-07-28 ASSESSMENT — PAIN SCALES - GENERAL
PAINLEVEL_OUTOF10: 7
PAINLEVEL_OUTOF10: 8
PAINLEVEL_OUTOF10: 10
PAINLEVEL_OUTOF10: 10
PAINLEVEL_OUTOF10: 7

## 2023-07-28 ASSESSMENT — PAIN DESCRIPTION - ORIENTATION: ORIENTATION: MID

## 2023-07-28 NOTE — CARE COORDINATION
Transition Planning     [facility]. They did not receive referral. [facility] noted referral fax as failed. Referral re-sent. [address] from [facility] can accept pt. They will start precert. Need to be seen by OT/PT this weekend. 65 Pt and family updated.

## 2023-07-28 NOTE — FLOWSHEET NOTE
07/28/23 1600   Treatment Team Notification   Reason for Communication Evaluate  (Low urine output)   Name of Team Member Notified Kan Aranda Early   Treatment Team Role Physician Assistant   Method of Communication Secure Message   Response See orders  (Albumin 25%)   Notification Time 1600

## 2023-07-28 NOTE — CARE COORDINATION
07/28/23 1602   Readmission Assessment   Number of Days since last admission? 1-7 days   Previous Disposition Other (comment)  (Not RAC transfer)   Who is being Interviewed Patient  (Not RAC transfer)   What was the patient's/caregiver's perception as to why they think they needed to return back to the hospital? Other (Comment)  (Not RAC transfer)   Did you visit your Primary Care Physician after you left the hospital, before you returned this time? No  (Not RAC transfer)   Why weren't you able to visit your PCP? Other (Comment)  (Not RAC transfer)   Did you see a specialist, such as Cardiac, Pulmonary, Orthopedic Physician, etc. after you left the hospital? Other (Comment)  (Not RAC transfer)   Who advised the patient to return to the hospital? Other (Comment)  (Not RAC transfer)   Does the patient report anything that got in the way of taking their medications? No  (Not RAC transfer)   In our efforts to provide the best possible care to you and others like you, can you think of anything that we could have done to help you after you left the hospital the first time, so that you might not have needed to return so soon?  Other (Comment)  (Not RAC transfer)

## 2023-07-29 ENCOUNTER — APPOINTMENT (OUTPATIENT)
Dept: GENERAL RADIOLOGY | Age: 83
DRG: 235 | End: 2023-07-29
Attending: INTERNAL MEDICINE
Payer: MEDICARE

## 2023-07-29 ENCOUNTER — APPOINTMENT (OUTPATIENT)
Dept: ULTRASOUND IMAGING | Age: 83
DRG: 235 | End: 2023-07-29
Attending: INTERNAL MEDICINE
Payer: MEDICARE

## 2023-07-29 ENCOUNTER — APPOINTMENT (OUTPATIENT)
Age: 83
DRG: 235 | End: 2023-07-29
Attending: INTERNAL MEDICINE
Payer: MEDICARE

## 2023-07-29 PROBLEM — N17.9 ACUTE KIDNEY INJURY (HCC): Status: ACTIVE | Noted: 2023-07-29

## 2023-07-29 PROBLEM — E87.1 HYPONATREMIA: Status: ACTIVE | Noted: 2023-07-29

## 2023-07-29 PROBLEM — I45.5 SINUS PAUSE: Status: ACTIVE | Noted: 2023-07-29

## 2023-07-29 LAB
AMORPH SED URNS QL MICRO: ABNORMAL
ANION GAP SERPL CALCULATED.3IONS-SCNC: 10 MMOL/L (ref 9–17)
ANION GAP SERPL CALCULATED.3IONS-SCNC: 12 MMOL/L (ref 9–17)
ANION GAP SERPL CALCULATED.3IONS-SCNC: 13 MMOL/L (ref 9–17)
ANION GAP SERPL CALCULATED.3IONS-SCNC: 15 MMOL/L (ref 9–17)
BASOPHILS # BLD: 0.03 K/UL (ref 0–0.2)
BASOPHILS NFR BLD: 0 % (ref 0–2)
BILIRUB UR QL STRIP: NEGATIVE
BUN BLD-MCNC: 35 MG/DL (ref 8–26)
BUN SERPL-MCNC: 32 MG/DL (ref 8–23)
BUN SERPL-MCNC: 33 MG/DL (ref 8–23)
BUN SERPL-MCNC: 35 MG/DL (ref 8–23)
BUN SERPL-MCNC: 38 MG/DL (ref 8–23)
CA-I BLD-SCNC: 0.98 MMOL/L (ref 1.13–1.33)
CA-I BLD-SCNC: 1.37 MMOL/L (ref 1.13–1.33)
CA-I BLD-SCNC: 1.46 MMOL/L (ref 1.15–1.33)
CALCIUM SERPL-MCNC: 8.7 MG/DL (ref 8.6–10.4)
CALCIUM SERPL-MCNC: 9.2 MG/DL (ref 8.6–10.4)
CALCIUM SERPL-MCNC: 9.4 MG/DL (ref 8.6–10.4)
CALCIUM SERPL-MCNC: 9.9 MG/DL (ref 8.6–10.4)
CASTS #/AREA URNS LPF: ABNORMAL /LPF (ref 0–2)
CHLORIDE BLD-SCNC: 101 MMOL/L (ref 98–107)
CHLORIDE SERPL-SCNC: 100 MMOL/L (ref 98–107)
CHLORIDE SERPL-SCNC: 101 MMOL/L (ref 98–107)
CHLORIDE SERPL-SCNC: 101 MMOL/L (ref 98–107)
CHLORIDE SERPL-SCNC: 97 MMOL/L (ref 98–107)
CLARITY UR: CLEAR
CO2 BLD CALC-SCNC: 21 MMOL/L (ref 22–30)
CO2 SERPL-SCNC: 17 MMOL/L (ref 20–31)
CO2 SERPL-SCNC: 18 MMOL/L (ref 20–31)
CO2 SERPL-SCNC: 19 MMOL/L (ref 20–31)
CO2 SERPL-SCNC: 20 MMOL/L (ref 20–31)
COLOR UR: YELLOW
CREAT SERPL-MCNC: 1.7 MG/DL (ref 0.7–1.2)
CREAT SERPL-MCNC: 1.9 MG/DL (ref 0.7–1.2)
CREAT UR-MCNC: 107.2 MG/DL (ref 39–259)
ECHO BSA: 2.08 M2
EGFR, POC: 46 ML/MIN/1.73M2
EKG ATRIAL RATE: 65 BPM
EKG P-R INTERVAL: 202 MS
EKG Q-T INTERVAL: 446 MS
EKG QRS DURATION: 132 MS
EKG QTC CALCULATION (BAZETT): 463 MS
EKG R AXIS: -60 DEGREES
EKG T AXIS: 2 DEGREES
EKG VENTRICULAR RATE: 65 BPM
EOSINOPHIL # BLD: 0.15 K/UL (ref 0–0.44)
EOSINOPHIL,URINE: NORMAL
EOSINOPHILS RELATIVE PERCENT: 2 % (ref 1–4)
EPI CELLS #/AREA URNS HPF: ABNORMAL /HPF (ref 0–5)
ERYTHROCYTE [DISTWIDTH] IN BLOOD BY AUTOMATED COUNT: 15.1 % (ref 11.8–14.4)
ERYTHROCYTE [DISTWIDTH] IN BLOOD BY AUTOMATED COUNT: 15.3 % (ref 11.8–14.4)
FIO2: 4
GFR SERPL CREATININE-BSD FRML MDRD: 35 ML/MIN/1.73M2
GFR SERPL CREATININE-BSD FRML MDRD: 40 ML/MIN/1.73M2
GLUCOSE BLD-MCNC: 198 MG/DL (ref 74–100)
GLUCOSE SERPL-MCNC: 192 MG/DL (ref 70–99)
GLUCOSE SERPL-MCNC: 201 MG/DL (ref 70–99)
GLUCOSE SERPL-MCNC: 208 MG/DL (ref 70–99)
GLUCOSE SERPL-MCNC: 218 MG/DL (ref 70–99)
GLUCOSE UR STRIP-MCNC: NEGATIVE MG/DL
HCT VFR BLD AUTO: 26 % (ref 41–53)
HCT VFR BLD AUTO: 28.1 % (ref 40.7–50.3)
HCT VFR BLD AUTO: 34 % (ref 40.7–50.3)
HGB BLD-MCNC: 11.7 G/DL (ref 13–17)
HGB BLD-MCNC: 9 G/DL (ref 13–17)
HGB UR QL STRIP.AUTO: ABNORMAL
IMM GRANULOCYTES # BLD AUTO: 0.06 K/UL (ref 0–0.3)
IMM GRANULOCYTES NFR BLD: 1 %
KETONES UR STRIP-MCNC: NEGATIVE MG/DL
LEUKOCYTE ESTERASE UR QL STRIP: ABNORMAL
LYMPHOCYTES NFR BLD: 0.95 K/UL (ref 1.1–3.7)
LYMPHOCYTES RELATIVE PERCENT: 13 % (ref 24–43)
MAGNESIUM SERPL-MCNC: 2.5 MG/DL (ref 1.6–2.6)
MCH RBC QN AUTO: 30.7 PG (ref 25.2–33.5)
MCH RBC QN AUTO: 31 PG (ref 25.2–33.5)
MCHC RBC AUTO-ENTMCNC: 32 G/DL (ref 28.4–34.8)
MCHC RBC AUTO-ENTMCNC: 34.4 G/DL (ref 28.4–34.8)
MCV RBC AUTO: 89.9 FL (ref 82.6–102.9)
MCV RBC AUTO: 95.9 FL (ref 82.6–102.9)
METER GLUCOSE: 152 MG/DL (ref 75–110)
METER GLUCOSE: 206 MG/DL (ref 75–110)
METER GLUCOSE: 213 MG/DL (ref 75–110)
METER GLUCOSE: 219 MG/DL (ref 75–110)
METER GLUCOSE: 220 MG/DL (ref 75–110)
MONOCYTES NFR BLD: 0.99 K/UL (ref 0.1–1.2)
MONOCYTES NFR BLD: 13 % (ref 3–12)
NEGATIVE BASE EXCESS, ART: 4.1 MMOL/L (ref 0–2)
NEUTROPHILS NFR BLD: 71 % (ref 36–65)
NEUTS SEG NFR BLD: 5.38 K/UL (ref 1.5–8.1)
NITRITE UR QL STRIP: NEGATIVE
NRBC BLD-RTO: 0 PER 100 WBC
NRBC BLD-RTO: 0.3 PER 100 WBC
O2 DELIVERY DEVICE: ABNORMAL
PH UR STRIP: 5 [PH] (ref 5–8)
PLATELET # BLD AUTO: ABNORMAL K/UL (ref 138–453)
PLATELET # BLD AUTO: ABNORMAL K/UL (ref 138–453)
PLATELET, FLUORESCENCE: 152 K/UL (ref 138–453)
PLATELET, FLUORESCENCE: ABNORMAL K/UL (ref 138–453)
PLATELETS.RETICULATED NFR BLD AUTO: 4 % (ref 1.1–10.3)
POC ANION GAP: 10 MMOL/L (ref 7–16)
POC CREATININE: 1.5 MG/DL (ref 0.51–1.19)
POC HCO3: 20.7 MMOL/L (ref 21–28)
POC HEMOGLOBIN (CALC): 8.8 G/DL (ref 13.5–17.5)
POC LACTIC ACID: 1.1 MMOL/L (ref 0.56–1.39)
POC O2 SATURATION: 98 % (ref 94–98)
POC PCO2: 35.6 MM HG (ref 35–48)
POC PH: 7.37 (ref 7.35–7.45)
POC PO2: 105.4 MM HG (ref 83–108)
POTASSIUM BLD-SCNC: 3.9 MMOL/L (ref 3.5–4.5)
POTASSIUM SERPL-SCNC: 4 MMOL/L (ref 3.7–5.3)
POTASSIUM SERPL-SCNC: 4.2 MMOL/L (ref 3.7–5.3)
POTASSIUM SERPL-SCNC: 4.3 MMOL/L (ref 3.7–5.3)
POTASSIUM SERPL-SCNC: 4.4 MMOL/L (ref 3.7–5.3)
PROT UR STRIP-MCNC: ABNORMAL MG/DL
RBC # BLD AUTO: 2.93 M/UL (ref 4.21–5.77)
RBC # BLD AUTO: 3.78 M/UL (ref 4.21–5.77)
RBC # BLD: ABNORMAL 10*6/UL
RBC #/AREA URNS HPF: ABNORMAL /HPF (ref 0–2)
SAMPLE SITE: ABNORMAL
SODIUM BLD-SCNC: 131 MMOL/L (ref 138–146)
SODIUM SERPL-SCNC: 129 MMOL/L (ref 135–144)
SODIUM SERPL-SCNC: 131 MMOL/L (ref 135–144)
SODIUM SERPL-SCNC: 131 MMOL/L (ref 135–144)
SODIUM SERPL-SCNC: 132 MMOL/L (ref 135–144)
SP GR UR STRIP: 1.01 (ref 1–1.03)
TOTAL PROTEIN, URINE: 51 MG/DL
UROBILINOGEN UR STRIP-ACNC: NORMAL EU/DL (ref 0–1)
WBC #/AREA URNS HPF: ABNORMAL /HPF (ref 0–5)
WBC OTHER # BLD: 7.6 K/UL (ref 3.5–11.3)
WBC OTHER # BLD: 9 K/UL (ref 3.5–11.3)

## 2023-07-29 PROCEDURE — 6370000000 HC RX 637 (ALT 250 FOR IP): Performed by: THORACIC SURGERY (CARDIOTHORACIC VASCULAR SURGERY)

## 2023-07-29 PROCEDURE — 6370000000 HC RX 637 (ALT 250 FOR IP): Performed by: PHYSICIAN ASSISTANT

## 2023-07-29 PROCEDURE — 36600 WITHDRAWAL OF ARTERIAL BLOOD: CPT

## 2023-07-29 PROCEDURE — 83605 ASSAY OF LACTIC ACID: CPT

## 2023-07-29 PROCEDURE — 93005 ELECTROCARDIOGRAM TRACING: CPT | Performed by: THORACIC SURGERY (CARDIOTHORACIC VASCULAR SURGERY)

## 2023-07-29 PROCEDURE — 87205 SMEAR GRAM STAIN: CPT

## 2023-07-29 PROCEDURE — 93308 TTE F-UP OR LMTD: CPT | Performed by: INTERNAL MEDICINE

## 2023-07-29 PROCEDURE — 6360000002 HC RX W HCPCS: Performed by: PHYSICIAN ASSISTANT

## 2023-07-29 PROCEDURE — 84520 ASSAY OF UREA NITROGEN: CPT

## 2023-07-29 PROCEDURE — 82803 BLOOD GASES ANY COMBINATION: CPT

## 2023-07-29 PROCEDURE — 80048 BASIC METABOLIC PNL TOTAL CA: CPT

## 2023-07-29 PROCEDURE — 82330 ASSAY OF CALCIUM: CPT

## 2023-07-29 PROCEDURE — 84156 ASSAY OF PROTEIN URINE: CPT

## 2023-07-29 PROCEDURE — 82565 ASSAY OF CREATININE: CPT

## 2023-07-29 PROCEDURE — 80051 ELECTROLYTE PANEL: CPT

## 2023-07-29 PROCEDURE — 82947 ASSAY GLUCOSE BLOOD QUANT: CPT

## 2023-07-29 PROCEDURE — 99291 CRITICAL CARE FIRST HOUR: CPT | Performed by: INTERNAL MEDICINE

## 2023-07-29 PROCEDURE — 76770 US EXAM ABDO BACK WALL COMP: CPT

## 2023-07-29 PROCEDURE — 93308 TTE F-UP OR LMTD: CPT

## 2023-07-29 PROCEDURE — 81001 URINALYSIS AUTO W/SCOPE: CPT

## 2023-07-29 PROCEDURE — 85055 RETICULATED PLATELET ASSAY: CPT

## 2023-07-29 PROCEDURE — 82570 ASSAY OF URINE CREATININE: CPT

## 2023-07-29 PROCEDURE — 2500000003 HC RX 250 WO HCPCS: Performed by: PHYSICIAN ASSISTANT

## 2023-07-29 PROCEDURE — 71045 X-RAY EXAM CHEST 1 VIEW: CPT

## 2023-07-29 PROCEDURE — 83735 ASSAY OF MAGNESIUM: CPT

## 2023-07-29 PROCEDURE — 94761 N-INVAS EAR/PLS OXIMETRY MLT: CPT

## 2023-07-29 PROCEDURE — 2580000003 HC RX 258: Performed by: PHYSICIAN ASSISTANT

## 2023-07-29 PROCEDURE — 2700000000 HC OXYGEN THERAPY PER DAY

## 2023-07-29 PROCEDURE — 85027 COMPLETE CBC AUTOMATED: CPT

## 2023-07-29 PROCEDURE — 99223 1ST HOSP IP/OBS HIGH 75: CPT | Performed by: INTERNAL MEDICINE

## 2023-07-29 PROCEDURE — 2100000001 HC CVICU R&B

## 2023-07-29 PROCEDURE — 85014 HEMATOCRIT: CPT

## 2023-07-29 PROCEDURE — 2580000003 HC RX 258: Performed by: NURSE PRACTITIONER

## 2023-07-29 PROCEDURE — 36415 COLL VENOUS BLD VENIPUNCTURE: CPT

## 2023-07-29 RX ORDER — POTASSIUM CHLORIDE 20 MEQ/1
10 TABLET, EXTENDED RELEASE ORAL 2 TIMES DAILY WITH MEALS
Status: DISCONTINUED | OUTPATIENT
Start: 2023-07-29 | End: 2023-07-31

## 2023-07-29 RX ORDER — MIDODRINE HYDROCHLORIDE 5 MG/1
5 TABLET ORAL
Status: DISCONTINUED | OUTPATIENT
Start: 2023-07-29 | End: 2023-07-30

## 2023-07-29 RX ORDER — CHOLECALCIFEROL (VITAMIN D3) 125 MCG
5 CAPSULE ORAL NIGHTLY
Status: DISCONTINUED | OUTPATIENT
Start: 2023-07-29 | End: 2023-08-06 | Stop reason: HOSPADM

## 2023-07-29 RX ORDER — MAGNESIUM SULFATE IN WATER 40 MG/ML
2000 INJECTION, SOLUTION INTRAVENOUS ONCE
Status: COMPLETED | OUTPATIENT
Start: 2023-07-29 | End: 2023-07-29

## 2023-07-29 RX ORDER — FUROSEMIDE 10 MG/ML
20 INJECTION INTRAMUSCULAR; INTRAVENOUS 2 TIMES DAILY
Status: DISCONTINUED | OUTPATIENT
Start: 2023-07-29 | End: 2023-07-31

## 2023-07-29 RX ORDER — 0.9 % SODIUM CHLORIDE 0.9 %
250 INTRAVENOUS SOLUTION INTRAVENOUS ONCE
Status: COMPLETED | OUTPATIENT
Start: 2023-07-29 | End: 2023-07-29

## 2023-07-29 RX ORDER — FUROSEMIDE 10 MG/ML
20 INJECTION INTRAMUSCULAR; INTRAVENOUS ONCE
Status: COMPLETED | OUTPATIENT
Start: 2023-07-29 | End: 2023-07-29

## 2023-07-29 RX ORDER — METOPROLOL TARTRATE 5 MG/5ML
2.5 INJECTION INTRAVENOUS ONCE
Status: COMPLETED | OUTPATIENT
Start: 2023-07-29 | End: 2023-07-29

## 2023-07-29 RX ADMIN — SENNOSIDES AND DOCUSATE SODIUM 1 TABLET: 50; 8.6 TABLET ORAL at 08:56

## 2023-07-29 RX ADMIN — CALCIUM CHLORIDE 2000 MG: 100 INJECTION, SOLUTION INTRAVENOUS at 05:26

## 2023-07-29 RX ADMIN — FUROSEMIDE 20 MG: 10 INJECTION, SOLUTION INTRAMUSCULAR; INTRAVENOUS at 00:38

## 2023-07-29 RX ADMIN — SODIUM CHLORIDE 25 ML/HR: 9 INJECTION, SOLUTION INTRAVENOUS at 05:25

## 2023-07-29 RX ADMIN — OXYCODONE HYDROCHLORIDE AND ACETAMINOPHEN 1 TABLET: 5; 325 TABLET ORAL at 17:35

## 2023-07-29 RX ADMIN — MIDODRINE HYDROCHLORIDE 5 MG: 5 TABLET ORAL at 17:44

## 2023-07-29 RX ADMIN — METOPROLOL TARTRATE 12.5 MG: 25 TABLET, FILM COATED ORAL at 08:56

## 2023-07-29 RX ADMIN — MUPIROCIN: 20 OINTMENT TOPICAL at 20:25

## 2023-07-29 RX ADMIN — ASPIRIN 81 MG 81 MG: 81 TABLET ORAL at 08:56

## 2023-07-29 RX ADMIN — SENNOSIDES AND DOCUSATE SODIUM 1 TABLET: 50; 8.6 TABLET ORAL at 20:25

## 2023-07-29 RX ADMIN — POLYETHYLENE GLYCOL 3350 17 G: 17 POWDER, FOR SOLUTION ORAL at 08:56

## 2023-07-29 RX ADMIN — SODIUM CHLORIDE, PRESERVATIVE FREE 10 ML: 5 INJECTION INTRAVENOUS at 20:31

## 2023-07-29 RX ADMIN — MAGNESIUM SULFATE HEPTAHYDRATE 2000 MG: 40 INJECTION, SOLUTION INTRAVENOUS at 14:26

## 2023-07-29 RX ADMIN — INSULIN LISPRO 2 UNITS: 100 INJECTION, SOLUTION INTRAVENOUS; SUBCUTANEOUS at 17:37

## 2023-07-29 RX ADMIN — MUPIROCIN: 20 OINTMENT TOPICAL at 08:56

## 2023-07-29 RX ADMIN — METOPROLOL TARTRATE 2.5 MG: 1 INJECTION, SOLUTION INTRAVENOUS at 09:33

## 2023-07-29 RX ADMIN — INSULIN LISPRO 1 UNITS: 100 INJECTION, SOLUTION INTRAVENOUS; SUBCUTANEOUS at 21:37

## 2023-07-29 RX ADMIN — ATORVASTATIN CALCIUM 20 MG: 40 TABLET, FILM COATED ORAL at 20:25

## 2023-07-29 RX ADMIN — SODIUM CHLORIDE: 9 INJECTION, SOLUTION INTRAVENOUS at 14:22

## 2023-07-29 RX ADMIN — POTASSIUM CHLORIDE 40 MEQ: 1500 TABLET, EXTENDED RELEASE ORAL at 11:29

## 2023-07-29 RX ADMIN — OXYCODONE HYDROCHLORIDE AND ACETAMINOPHEN 2 TABLET: 5; 325 TABLET ORAL at 05:19

## 2023-07-29 RX ADMIN — TAMSULOSIN HYDROCHLORIDE 0.4 MG: 0.4 CAPSULE ORAL at 13:35

## 2023-07-29 RX ADMIN — FUROSEMIDE 20 MG: 10 INJECTION, SOLUTION INTRAMUSCULAR; INTRAVENOUS at 20:25

## 2023-07-29 RX ADMIN — FUROSEMIDE 20 MG: 10 INJECTION, SOLUTION INTRAMUSCULAR; INTRAVENOUS at 13:44

## 2023-07-29 RX ADMIN — MIDODRINE HYDROCHLORIDE 5 MG: 5 TABLET ORAL at 13:35

## 2023-07-29 RX ADMIN — INSULIN LISPRO 2 UNITS: 100 INJECTION, SOLUTION INTRAVENOUS; SUBCUTANEOUS at 13:54

## 2023-07-29 RX ADMIN — SODIUM CHLORIDE, PRESERVATIVE FREE 10 ML: 5 INJECTION INTRAVENOUS at 08:56

## 2023-07-29 RX ADMIN — OXYCODONE HYDROCHLORIDE AND ACETAMINOPHEN 2 TABLET: 5; 325 TABLET ORAL at 00:44

## 2023-07-29 RX ADMIN — SODIUM CHLORIDE 250 ML: 9 INJECTION, SOLUTION INTRAVENOUS at 15:14

## 2023-07-29 RX ADMIN — INSULIN GLARGINE 10 UNITS: 100 INJECTION, SOLUTION SUBCUTANEOUS at 21:38

## 2023-07-29 RX ADMIN — Medication 5 MG: at 21:39

## 2023-07-29 RX ADMIN — INSULIN LISPRO 1 UNITS: 100 INJECTION, SOLUTION INTRAVENOUS; SUBCUTANEOUS at 08:57

## 2023-07-29 RX ADMIN — AMIODARONE HYDROCHLORIDE 200 MG: 200 TABLET ORAL at 08:55

## 2023-07-29 RX ADMIN — POTASSIUM CHLORIDE 10 MEQ: 1500 TABLET, EXTENDED RELEASE ORAL at 18:17

## 2023-07-29 ASSESSMENT — PAIN - FUNCTIONAL ASSESSMENT: PAIN_FUNCTIONAL_ASSESSMENT: ACTIVITIES ARE NOT PREVENTED

## 2023-07-29 ASSESSMENT — PAIN DESCRIPTION - FREQUENCY: FREQUENCY: INTERMITTENT

## 2023-07-29 ASSESSMENT — PAIN DESCRIPTION - LOCATION
LOCATION: STERNUM

## 2023-07-29 ASSESSMENT — PAIN DESCRIPTION - ORIENTATION
ORIENTATION: MID

## 2023-07-29 ASSESSMENT — PAIN DESCRIPTION - DESCRIPTORS
DESCRIPTORS: ACHING;DISCOMFORT
DESCRIPTORS: ACHING;DISCOMFORT
DESCRIPTORS: DISCOMFORT

## 2023-07-29 ASSESSMENT — PAIN SCALES - GENERAL
PAINLEVEL_OUTOF10: 7
PAINLEVEL_OUTOF10: 7
PAINLEVEL_OUTOF10: 4

## 2023-07-29 ASSESSMENT — PAIN DESCRIPTION - ONSET: ONSET: GRADUAL

## 2023-07-29 ASSESSMENT — PAIN DESCRIPTION - PAIN TYPE: TYPE: SURGICAL PAIN

## 2023-07-30 ENCOUNTER — APPOINTMENT (OUTPATIENT)
Dept: GENERAL RADIOLOGY | Age: 83
DRG: 235 | End: 2023-07-30
Attending: INTERNAL MEDICINE
Payer: MEDICARE

## 2023-07-30 LAB
ANION GAP SERPL CALCULATED.3IONS-SCNC: 12 MMOL/L (ref 9–17)
ANION GAP SERPL CALCULATED.3IONS-SCNC: 13 MMOL/L (ref 9–17)
ANION GAP SERPL CALCULATED.3IONS-SCNC: 15 MMOL/L (ref 9–17)
BUN SERPL-MCNC: 39 MG/DL (ref 8–23)
BUN SERPL-MCNC: 39 MG/DL (ref 8–23)
BUN SERPL-MCNC: 41 MG/DL (ref 8–23)
CA-I BLD-SCNC: 1.31 MMOL/L (ref 1.13–1.33)
CALCIUM SERPL-MCNC: 9.2 MG/DL (ref 8.6–10.4)
CALCIUM SERPL-MCNC: 9.4 MG/DL (ref 8.6–10.4)
CALCIUM SERPL-MCNC: 9.5 MG/DL (ref 8.6–10.4)
CHLORIDE SERPL-SCNC: 100 MMOL/L (ref 98–107)
CHLORIDE SERPL-SCNC: 96 MMOL/L (ref 98–107)
CHLORIDE SERPL-SCNC: 97 MMOL/L (ref 98–107)
CO2 SERPL-SCNC: 17 MMOL/L (ref 20–31)
CO2 SERPL-SCNC: 19 MMOL/L (ref 20–31)
CO2 SERPL-SCNC: 20 MMOL/L (ref 20–31)
CREAT SERPL-MCNC: 1.6 MG/DL (ref 0.7–1.2)
CREAT SERPL-MCNC: 1.6 MG/DL (ref 0.7–1.2)
CREAT SERPL-MCNC: 1.7 MG/DL (ref 0.7–1.2)
ERYTHROCYTE [DISTWIDTH] IN BLOOD BY AUTOMATED COUNT: 15.1 % (ref 11.8–14.4)
GFR SERPL CREATININE-BSD FRML MDRD: 40 ML/MIN/1.73M2
GFR SERPL CREATININE-BSD FRML MDRD: 42 ML/MIN/1.73M2
GFR SERPL CREATININE-BSD FRML MDRD: 42 ML/MIN/1.73M2
GLUCOSE SERPL-MCNC: 169 MG/DL (ref 70–99)
GLUCOSE SERPL-MCNC: 195 MG/DL (ref 70–99)
GLUCOSE SERPL-MCNC: 230 MG/DL (ref 70–99)
HCT VFR BLD AUTO: 29.7 % (ref 40.7–50.3)
HGB BLD-MCNC: 9.5 G/DL (ref 13–17)
MAGNESIUM SERPL-MCNC: 2.6 MG/DL (ref 1.6–2.6)
MCH RBC QN AUTO: 30.6 PG (ref 25.2–33.5)
MCHC RBC AUTO-ENTMCNC: 32 G/DL (ref 28.4–34.8)
MCV RBC AUTO: 95.8 FL (ref 82.6–102.9)
NRBC BLD-RTO: 0 PER 100 WBC
PLATELET # BLD AUTO: 161 K/UL (ref 138–453)
PMV BLD AUTO: 10.3 FL (ref 8.1–13.5)
POTASSIUM SERPL-SCNC: 4.2 MMOL/L (ref 3.7–5.3)
POTASSIUM SERPL-SCNC: 4.3 MMOL/L (ref 3.7–5.3)
POTASSIUM SERPL-SCNC: 4.5 MMOL/L (ref 3.7–5.3)
RBC # BLD AUTO: 3.1 M/UL (ref 4.21–5.77)
SODIUM SERPL-SCNC: 128 MMOL/L (ref 135–144)
SODIUM SERPL-SCNC: 129 MMOL/L (ref 135–144)
SODIUM SERPL-SCNC: 132 MMOL/L (ref 135–144)
WBC OTHER # BLD: 8.1 K/UL (ref 3.5–11.3)

## 2023-07-30 PROCEDURE — 71045 X-RAY EXAM CHEST 1 VIEW: CPT

## 2023-07-30 PROCEDURE — 6370000000 HC RX 637 (ALT 250 FOR IP): Performed by: THORACIC SURGERY (CARDIOTHORACIC VASCULAR SURGERY)

## 2023-07-30 PROCEDURE — 6360000002 HC RX W HCPCS: Performed by: PHYSICIAN ASSISTANT

## 2023-07-30 PROCEDURE — 85027 COMPLETE CBC AUTOMATED: CPT

## 2023-07-30 PROCEDURE — 97110 THERAPEUTIC EXERCISES: CPT

## 2023-07-30 PROCEDURE — 80048 BASIC METABOLIC PNL TOTAL CA: CPT

## 2023-07-30 PROCEDURE — 2100000001 HC CVICU R&B

## 2023-07-30 PROCEDURE — 6370000000 HC RX 637 (ALT 250 FOR IP): Performed by: PHYSICIAN ASSISTANT

## 2023-07-30 PROCEDURE — 36415 COLL VENOUS BLD VENIPUNCTURE: CPT

## 2023-07-30 PROCEDURE — 83735 ASSAY OF MAGNESIUM: CPT

## 2023-07-30 PROCEDURE — 2580000003 HC RX 258: Performed by: PHYSICIAN ASSISTANT

## 2023-07-30 PROCEDURE — 97535 SELF CARE MNGMENT TRAINING: CPT

## 2023-07-30 PROCEDURE — 82330 ASSAY OF CALCIUM: CPT

## 2023-07-30 PROCEDURE — 97116 GAIT TRAINING THERAPY: CPT

## 2023-07-30 PROCEDURE — 99233 SBSQ HOSP IP/OBS HIGH 50: CPT | Performed by: INTERNAL MEDICINE

## 2023-07-30 RX ADMIN — SENNOSIDES AND DOCUSATE SODIUM 1 TABLET: 50; 8.6 TABLET ORAL at 08:56

## 2023-07-30 RX ADMIN — ATORVASTATIN CALCIUM 20 MG: 40 TABLET, FILM COATED ORAL at 20:53

## 2023-07-30 RX ADMIN — MUPIROCIN: 20 OINTMENT TOPICAL at 21:31

## 2023-07-30 RX ADMIN — FUROSEMIDE 20 MG: 10 INJECTION, SOLUTION INTRAMUSCULAR; INTRAVENOUS at 20:53

## 2023-07-30 RX ADMIN — TAMSULOSIN HYDROCHLORIDE 0.4 MG: 0.4 CAPSULE ORAL at 08:56

## 2023-07-30 RX ADMIN — CLOPIDOGREL BISULFATE 75 MG: 75 TABLET ORAL at 08:56

## 2023-07-30 RX ADMIN — POTASSIUM CHLORIDE 10 MEQ: 1500 TABLET, EXTENDED RELEASE ORAL at 08:57

## 2023-07-30 RX ADMIN — INSULIN GLARGINE 10 UNITS: 100 INJECTION, SOLUTION SUBCUTANEOUS at 20:57

## 2023-07-30 RX ADMIN — SODIUM CHLORIDE, PRESERVATIVE FREE 10 ML: 5 INJECTION INTRAVENOUS at 21:31

## 2023-07-30 RX ADMIN — INSULIN LISPRO 1 UNITS: 100 INJECTION, SOLUTION INTRAVENOUS; SUBCUTANEOUS at 13:28

## 2023-07-30 RX ADMIN — INSULIN LISPRO 1 UNITS: 100 INJECTION, SOLUTION INTRAVENOUS; SUBCUTANEOUS at 08:54

## 2023-07-30 RX ADMIN — POLYETHYLENE GLYCOL 3350 17 G: 17 POWDER, FOR SOLUTION ORAL at 09:30

## 2023-07-30 RX ADMIN — OXYCODONE HYDROCHLORIDE AND ACETAMINOPHEN 2 TABLET: 5; 325 TABLET ORAL at 07:07

## 2023-07-30 RX ADMIN — ASPIRIN 81 MG 81 MG: 81 TABLET ORAL at 08:56

## 2023-07-30 RX ADMIN — INSULIN LISPRO 1 UNITS: 100 INJECTION, SOLUTION INTRAVENOUS; SUBCUTANEOUS at 20:57

## 2023-07-30 RX ADMIN — INSULIN LISPRO 2 UNITS: 100 INJECTION, SOLUTION INTRAVENOUS; SUBCUTANEOUS at 17:53

## 2023-07-30 RX ADMIN — Medication 5 MG: at 21:04

## 2023-07-30 RX ADMIN — FUROSEMIDE 20 MG: 10 INJECTION, SOLUTION INTRAMUSCULAR; INTRAVENOUS at 08:57

## 2023-07-30 RX ADMIN — SODIUM CHLORIDE, PRESERVATIVE FREE 20 ML: 5 INJECTION INTRAVENOUS at 10:00

## 2023-07-30 RX ADMIN — SENNOSIDES AND DOCUSATE SODIUM 1 TABLET: 50; 8.6 TABLET ORAL at 20:53

## 2023-07-30 RX ADMIN — POTASSIUM CHLORIDE 10 MEQ: 1500 TABLET, EXTENDED RELEASE ORAL at 18:33

## 2023-07-30 ASSESSMENT — PAIN SCALES - GENERAL: PAINLEVEL_OUTOF10: 7

## 2023-07-30 ASSESSMENT — PAIN DESCRIPTION - DESCRIPTORS: DESCRIPTORS: ACHING

## 2023-07-30 ASSESSMENT — PAIN DESCRIPTION - LOCATION: LOCATION: INCISION;STERNUM

## 2023-07-31 ENCOUNTER — APPOINTMENT (OUTPATIENT)
Dept: GENERAL RADIOLOGY | Age: 83
DRG: 235 | End: 2023-07-31
Attending: INTERNAL MEDICINE
Payer: MEDICARE

## 2023-07-31 ENCOUNTER — APPOINTMENT (OUTPATIENT)
Age: 83
DRG: 235 | End: 2023-07-31
Attending: INTERNAL MEDICINE
Payer: MEDICARE

## 2023-07-31 LAB
ANION GAP SERPL CALCULATED.3IONS-SCNC: 12 MMOL/L (ref 9–17)
ANION GAP SERPL CALCULATED.3IONS-SCNC: 13 MMOL/L (ref 9–17)
ANION GAP SERPL CALCULATED.3IONS-SCNC: 14 MMOL/L (ref 9–17)
ANION GAP SERPL CALCULATED.3IONS-SCNC: 15 MMOL/L (ref 9–17)
BUN SERPL-MCNC: 38 MG/DL (ref 8–23)
BUN SERPL-MCNC: 39 MG/DL (ref 8–23)
BUN SERPL-MCNC: 40 MG/DL (ref 8–23)
BUN SERPL-MCNC: 40 MG/DL (ref 8–23)
CA-I BLD-SCNC: 1.19 MMOL/L (ref 1.13–1.33)
CALCIUM SERPL-MCNC: 9 MG/DL (ref 8.6–10.4)
CALCIUM SERPL-MCNC: 9.2 MG/DL (ref 8.6–10.4)
CALCIUM SERPL-MCNC: 9.3 MG/DL (ref 8.6–10.4)
CALCIUM SERPL-MCNC: 9.4 MG/DL (ref 8.6–10.4)
CHLORIDE SERPL-SCNC: 94 MMOL/L (ref 98–107)
CHLORIDE SERPL-SCNC: 98 MMOL/L (ref 98–107)
CO2 SERPL-SCNC: 20 MMOL/L (ref 20–31)
CO2 SERPL-SCNC: 21 MMOL/L (ref 20–31)
CO2 SERPL-SCNC: 21 MMOL/L (ref 20–31)
CO2 SERPL-SCNC: 23 MMOL/L (ref 20–31)
CREAT SERPL-MCNC: 1.4 MG/DL (ref 0.7–1.2)
CREAT SERPL-MCNC: 1.4 MG/DL (ref 0.7–1.2)
CREAT SERPL-MCNC: 1.5 MG/DL (ref 0.7–1.2)
CREAT SERPL-MCNC: 1.6 MG/DL (ref 0.7–1.2)
ECHO BSA: 2.08 M2
ECHO LV EDV 3D: 255 ML
ECHO LV EDV A2C: 22 ML
ECHO LV EDV A4C: 28 ML
ECHO LV EDV INDEX 3D: 126 ML/M2
ECHO LV EDV INDEX A4C: 14 ML/M2
ECHO LV EDV NDEX A2C: 11 ML/M2
ECHO LV EJECTION FRACTION 3D: 49 %
ECHO LV EJECTION FRACTION A2C: 44 %
ECHO LV EJECTION FRACTION A4C: 46 %
ECHO LV EJECTION FRACTION BIPLANE: 45 % (ref 55–100)
ECHO LV ESV 3D: 129 ML
ECHO LV ESV A2C: 12 ML
ECHO LV ESV A4C: 15 ML
ECHO LV ESV INDEX 3D: 64 ML/M2
ECHO LV ESV INDEX A2C: 6 ML/M2
ECHO LV ESV INDEX A4C: 7 ML/M2
ECHO LV MASS 3D INDEX: 163.5 G/M2
ECHO LV MASS 3D: 332 G
ERYTHROCYTE [DISTWIDTH] IN BLOOD BY AUTOMATED COUNT: 14.7 % (ref 11.8–14.4)
GFR SERPL CREATININE-BSD FRML MDRD: 42 ML/MIN/1.73M2
GFR SERPL CREATININE-BSD FRML MDRD: 46 ML/MIN/1.73M2
GFR SERPL CREATININE-BSD FRML MDRD: 50 ML/MIN/1.73M2
GFR SERPL CREATININE-BSD FRML MDRD: 50 ML/MIN/1.73M2
GLUCOSE SERPL-MCNC: 157 MG/DL (ref 70–99)
GLUCOSE SERPL-MCNC: 172 MG/DL (ref 70–99)
GLUCOSE SERPL-MCNC: 180 MG/DL (ref 70–99)
GLUCOSE SERPL-MCNC: 195 MG/DL (ref 70–99)
HCT VFR BLD AUTO: 27.4 % (ref 40.7–50.3)
HGB BLD-MCNC: 9.2 G/DL (ref 13–17)
MAGNESIUM SERPL-MCNC: 2 MG/DL (ref 1.6–2.6)
MAGNESIUM SERPL-MCNC: 2.1 MG/DL (ref 1.6–2.6)
MCH RBC QN AUTO: 30.7 PG (ref 25.2–33.5)
MCHC RBC AUTO-ENTMCNC: 33.6 G/DL (ref 28.4–34.8)
MCV RBC AUTO: 91.3 FL (ref 82.6–102.9)
METER GLUCOSE: 174 MG/DL (ref 75–110)
METER GLUCOSE: 179 MG/DL (ref 75–110)
METER GLUCOSE: 218 MG/DL (ref 75–110)
METER GLUCOSE: 234 MG/DL (ref 75–110)
NRBC BLD-RTO: 0 PER 100 WBC
PLATELET # BLD AUTO: 189 K/UL (ref 138–453)
PMV BLD AUTO: 9.6 FL (ref 8.1–13.5)
POTASSIUM SERPL-SCNC: 4 MMOL/L (ref 3.7–5.3)
POTASSIUM SERPL-SCNC: 4.1 MMOL/L (ref 3.7–5.3)
POTASSIUM SERPL-SCNC: 4.2 MMOL/L (ref 3.7–5.3)
POTASSIUM SERPL-SCNC: 4.4 MMOL/L (ref 3.7–5.3)
RBC # BLD AUTO: 3 M/UL (ref 4.21–5.77)
REASON FOR REJECTION: NORMAL
SODIUM SERPL-SCNC: 129 MMOL/L (ref 135–144)
SODIUM SERPL-SCNC: 129 MMOL/L (ref 135–144)
SODIUM SERPL-SCNC: 130 MMOL/L (ref 135–144)
SODIUM SERPL-SCNC: 131 MMOL/L (ref 135–144)
SPECIMEN SOURCE: NORMAL
WBC OTHER # BLD: 6.6 K/UL (ref 3.5–11.3)
ZZ NTE CLEAN UP: ORDERED TEST: NORMAL

## 2023-07-31 PROCEDURE — 2580000003 HC RX 258: Performed by: NURSE PRACTITIONER

## 2023-07-31 PROCEDURE — 6370000000 HC RX 637 (ALT 250 FOR IP): Performed by: PHYSICIAN ASSISTANT

## 2023-07-31 PROCEDURE — 93005 ELECTROCARDIOGRAM TRACING: CPT | Performed by: THORACIC SURGERY (CARDIOTHORACIC VASCULAR SURGERY)

## 2023-07-31 PROCEDURE — 83735 ASSAY OF MAGNESIUM: CPT

## 2023-07-31 PROCEDURE — 6360000002 HC RX W HCPCS: Performed by: THORACIC SURGERY (CARDIOTHORACIC VASCULAR SURGERY)

## 2023-07-31 PROCEDURE — 99233 SBSQ HOSP IP/OBS HIGH 50: CPT | Performed by: INTERNAL MEDICINE

## 2023-07-31 PROCEDURE — 82330 ASSAY OF CALCIUM: CPT

## 2023-07-31 PROCEDURE — 93308 TTE F-UP OR LMTD: CPT | Performed by: INTERNAL MEDICINE

## 2023-07-31 PROCEDURE — 85027 COMPLETE CBC AUTOMATED: CPT

## 2023-07-31 PROCEDURE — P9041 ALBUMIN (HUMAN),5%, 50ML: HCPCS | Performed by: PHYSICIAN ASSISTANT

## 2023-07-31 PROCEDURE — 71045 X-RAY EXAM CHEST 1 VIEW: CPT

## 2023-07-31 PROCEDURE — 97116 GAIT TRAINING THERAPY: CPT

## 2023-07-31 PROCEDURE — 36415 COLL VENOUS BLD VENIPUNCTURE: CPT

## 2023-07-31 PROCEDURE — 6370000000 HC RX 637 (ALT 250 FOR IP): Performed by: STUDENT IN AN ORGANIZED HEALTH CARE EDUCATION/TRAINING PROGRAM

## 2023-07-31 PROCEDURE — 99024 POSTOP FOLLOW-UP VISIT: CPT | Performed by: NURSE PRACTITIONER

## 2023-07-31 PROCEDURE — 6370000000 HC RX 637 (ALT 250 FOR IP): Performed by: THORACIC SURGERY (CARDIOTHORACIC VASCULAR SURGERY)

## 2023-07-31 PROCEDURE — 2500000003 HC RX 250 WO HCPCS

## 2023-07-31 PROCEDURE — 82947 ASSAY GLUCOSE BLOOD QUANT: CPT

## 2023-07-31 PROCEDURE — 2580000003 HC RX 258: Performed by: PHYSICIAN ASSISTANT

## 2023-07-31 PROCEDURE — 80048 BASIC METABOLIC PNL TOTAL CA: CPT

## 2023-07-31 PROCEDURE — 93308 TTE F-UP OR LMTD: CPT

## 2023-07-31 PROCEDURE — 6360000002 HC RX W HCPCS: Performed by: PHYSICIAN ASSISTANT

## 2023-07-31 PROCEDURE — 2100000001 HC CVICU R&B

## 2023-07-31 PROCEDURE — 99232 SBSQ HOSP IP/OBS MODERATE 35: CPT | Performed by: INTERNAL MEDICINE

## 2023-07-31 RX ORDER — METOPROLOL TARTRATE 5 MG/5ML
INJECTION INTRAVENOUS
Status: COMPLETED
Start: 2023-07-31 | End: 2023-07-31

## 2023-07-31 RX ORDER — METOPROLOL TARTRATE 5 MG/5ML
5 INJECTION INTRAVENOUS ONCE
Status: COMPLETED | OUTPATIENT
Start: 2023-07-31 | End: 2023-07-31

## 2023-07-31 RX ORDER — POTASSIUM CHLORIDE 20 MEQ/1
10 TABLET, EXTENDED RELEASE ORAL 2 TIMES DAILY WITH MEALS
Status: DISCONTINUED | OUTPATIENT
Start: 2023-07-31 | End: 2023-07-31

## 2023-07-31 RX ORDER — FUROSEMIDE 10 MG/ML
20 INJECTION INTRAMUSCULAR; INTRAVENOUS DAILY
Status: DISCONTINUED | OUTPATIENT
Start: 2023-08-01 | End: 2023-08-01

## 2023-07-31 RX ORDER — SODIUM CHLORIDE 9 MG/ML
INJECTION, SOLUTION INTRAVENOUS ONCE
Status: COMPLETED | OUTPATIENT
Start: 2023-07-31 | End: 2023-07-31

## 2023-07-31 RX ORDER — MAGNESIUM SULFATE IN WATER 40 MG/ML
2000 INJECTION, SOLUTION INTRAVENOUS ONCE
Status: DISCONTINUED | OUTPATIENT
Start: 2023-07-31 | End: 2023-07-31

## 2023-07-31 RX ORDER — MAGNESIUM SULFATE IN WATER 40 MG/ML
2000 INJECTION, SOLUTION INTRAVENOUS ONCE
Status: COMPLETED | OUTPATIENT
Start: 2023-07-31 | End: 2023-07-31

## 2023-07-31 RX ADMIN — INSULIN LISPRO 1 UNITS: 100 INJECTION, SOLUTION INTRAVENOUS; SUBCUTANEOUS at 20:51

## 2023-07-31 RX ADMIN — ALBUMIN (HUMAN) 25 G: 12.5 INJECTION, SOLUTION INTRAVENOUS at 13:18

## 2023-07-31 RX ADMIN — SODIUM CHLORIDE, PRESERVATIVE FREE 10 ML: 5 INJECTION INTRAVENOUS at 20:54

## 2023-07-31 RX ADMIN — FUROSEMIDE 20 MG: 10 INJECTION, SOLUTION INTRAMUSCULAR; INTRAVENOUS at 09:12

## 2023-07-31 RX ADMIN — SODIUM CHLORIDE, PRESERVATIVE FREE 10 ML: 5 INJECTION INTRAVENOUS at 09:12

## 2023-07-31 RX ADMIN — SENNOSIDES AND DOCUSATE SODIUM 1 TABLET: 50; 8.6 TABLET ORAL at 09:10

## 2023-07-31 RX ADMIN — POLYETHYLENE GLYCOL 3350 17 G: 17 POWDER, FOR SOLUTION ORAL at 09:11

## 2023-07-31 RX ADMIN — METOPROLOL TARTRATE 5 MG: 1 INJECTION, SOLUTION INTRAVENOUS at 10:07

## 2023-07-31 RX ADMIN — POTASSIUM CHLORIDE 10 MEQ: 1500 TABLET, EXTENDED RELEASE ORAL at 09:11

## 2023-07-31 RX ADMIN — SODIUM CHLORIDE: 9 INJECTION, SOLUTION INTRAVENOUS at 21:34

## 2023-07-31 RX ADMIN — INSULIN GLARGINE 10 UNITS: 100 INJECTION, SOLUTION SUBCUTANEOUS at 20:52

## 2023-07-31 RX ADMIN — FENTANYL CITRATE 25 MCG: 50 INJECTION, SOLUTION INTRAMUSCULAR; INTRAVENOUS at 23:00

## 2023-07-31 RX ADMIN — MAGNESIUM SULFATE HEPTAHYDRATE 2000 MG: 40 INJECTION, SOLUTION INTRAVENOUS at 09:22

## 2023-07-31 RX ADMIN — MUPIROCIN: 20 OINTMENT TOPICAL at 09:11

## 2023-07-31 RX ADMIN — Medication 5 MG: at 20:57

## 2023-07-31 RX ADMIN — ATORVASTATIN CALCIUM 20 MG: 40 TABLET, FILM COATED ORAL at 20:52

## 2023-07-31 RX ADMIN — INSULIN LISPRO 2 UNITS: 100 INJECTION, SOLUTION INTRAVENOUS; SUBCUTANEOUS at 14:10

## 2023-07-31 RX ADMIN — ASPIRIN 81 MG 81 MG: 81 TABLET ORAL at 09:10

## 2023-07-31 RX ADMIN — METOPROLOL TARTRATE 12.5 MG: 25 TABLET, FILM COATED ORAL at 20:53

## 2023-07-31 RX ADMIN — SENNOSIDES AND DOCUSATE SODIUM 1 TABLET: 50; 8.6 TABLET ORAL at 20:52

## 2023-07-31 RX ADMIN — INSULIN LISPRO 1 UNITS: 100 INJECTION, SOLUTION INTRAVENOUS; SUBCUTANEOUS at 18:36

## 2023-07-31 RX ADMIN — METOPROLOL TARTRATE 5 MG: 5 INJECTION INTRAVENOUS at 10:07

## 2023-07-31 RX ADMIN — TAMSULOSIN HYDROCHLORIDE 0.4 MG: 0.4 CAPSULE ORAL at 09:12

## 2023-07-31 RX ADMIN — OXYCODONE HYDROCHLORIDE AND ACETAMINOPHEN 2 TABLET: 5; 325 TABLET ORAL at 19:45

## 2023-07-31 RX ADMIN — CLOPIDOGREL BISULFATE 75 MG: 75 TABLET ORAL at 09:10

## 2023-07-31 ASSESSMENT — PAIN SCALES - GENERAL
PAINLEVEL_OUTOF10: 7
PAINLEVEL_OUTOF10: 0
PAINLEVEL_OUTOF10: 0
PAINLEVEL_OUTOF10: 3
PAINLEVEL_OUTOF10: 5
PAINLEVEL_OUTOF10: 8
PAINLEVEL_OUTOF10: 7
PAINLEVEL_OUTOF10: 0

## 2023-07-31 ASSESSMENT — PAIN DESCRIPTION - LOCATION
LOCATION: CHEST;STERNUM
LOCATION: CHEST;INCISION
LOCATION: CHEST

## 2023-07-31 ASSESSMENT — PAIN DESCRIPTION - DESCRIPTORS: DESCRIPTORS: STABBING;SHOOTING

## 2023-07-31 ASSESSMENT — PAIN DESCRIPTION - ORIENTATION: ORIENTATION: MID

## 2023-07-31 NOTE — CARE COORDINATION
Met with pt to discuss external defibrillator, provided handouts on Zoll and Kestra vests, need choice. 12 - Met with pt and family, discussed external defibrillator with pt's daughter, Karina Hart, and per Karina Hart, she will review. Family requested updated on [facility], writer placed call to [facility], left message for Meghan, call back requested. 1507 - Received message from Meghan at [facility], updated clinicals were sent to [employer] company today, per their request. Await response.

## 2023-08-01 ENCOUNTER — APPOINTMENT (OUTPATIENT)
Dept: GENERAL RADIOLOGY | Age: 83
DRG: 235 | End: 2023-08-01
Attending: INTERNAL MEDICINE
Payer: MEDICARE

## 2023-08-01 LAB
ANION GAP SERPL CALCULATED.3IONS-SCNC: 11 MMOL/L (ref 9–17)
ANION GAP SERPL CALCULATED.3IONS-SCNC: 12 MMOL/L (ref 9–17)
ANION GAP SERPL CALCULATED.3IONS-SCNC: 15 MMOL/L (ref 9–17)
BUN SERPL-MCNC: 35 MG/DL (ref 8–23)
BUN SERPL-MCNC: 35 MG/DL (ref 8–23)
BUN SERPL-MCNC: 37 MG/DL (ref 8–23)
CA-I BLD-SCNC: 1.11 MMOL/L (ref 1.13–1.33)
CALCIUM SERPL-MCNC: 9.2 MG/DL (ref 8.6–10.4)
CALCIUM SERPL-MCNC: 9.6 MG/DL (ref 8.6–10.4)
CALCIUM SERPL-MCNC: 9.6 MG/DL (ref 8.6–10.4)
CHLORIDE SERPL-SCNC: 94 MMOL/L (ref 98–107)
CHLORIDE SERPL-SCNC: 97 MMOL/L (ref 98–107)
CHLORIDE SERPL-SCNC: 98 MMOL/L (ref 98–107)
CO2 SERPL-SCNC: 21 MMOL/L (ref 20–31)
CO2 SERPL-SCNC: 23 MMOL/L (ref 20–31)
CO2 SERPL-SCNC: 24 MMOL/L (ref 20–31)
CREAT SERPL-MCNC: 1.3 MG/DL (ref 0.7–1.2)
CREAT SERPL-MCNC: 1.3 MG/DL (ref 0.7–1.2)
CREAT SERPL-MCNC: 1.4 MG/DL (ref 0.7–1.2)
EKG ATRIAL RATE: 107 BPM
EKG Q-T INTERVAL: 434 MS
EKG QRS DURATION: 144 MS
EKG QTC CALCULATION (BAZETT): 576 MS
EKG R AXIS: -55 DEGREES
EKG T AXIS: 66 DEGREES
EKG VENTRICULAR RATE: 106 BPM
ERYTHROCYTE [DISTWIDTH] IN BLOOD BY AUTOMATED COUNT: 14.6 % (ref 11.8–14.4)
GFR SERPL CREATININE-BSD FRML MDRD: 50 ML/MIN/1.73M2
GFR SERPL CREATININE-BSD FRML MDRD: 55 ML/MIN/1.73M2
GFR SERPL CREATININE-BSD FRML MDRD: 55 ML/MIN/1.73M2
GLUCOSE SERPL-MCNC: 129 MG/DL (ref 70–99)
GLUCOSE SERPL-MCNC: 143 MG/DL (ref 70–99)
GLUCOSE SERPL-MCNC: 245 MG/DL (ref 70–99)
HCT VFR BLD AUTO: 26.1 % (ref 40.7–50.3)
HGB BLD-MCNC: 8.7 G/DL (ref 13–17)
MAGNESIUM SERPL-MCNC: 2.1 MG/DL (ref 1.6–2.6)
MCH RBC QN AUTO: 30.1 PG (ref 25.2–33.5)
MCHC RBC AUTO-ENTMCNC: 33.3 G/DL (ref 28.4–34.8)
MCV RBC AUTO: 90.3 FL (ref 82.6–102.9)
METER GLUCOSE: 117 MG/DL (ref 75–110)
METER GLUCOSE: 121 MG/DL (ref 75–110)
METER GLUCOSE: 235 MG/DL (ref 75–110)
NRBC BLD-RTO: 0 PER 100 WBC
PLATELET # BLD AUTO: 236 K/UL (ref 138–453)
PMV BLD AUTO: 9.4 FL (ref 8.1–13.5)
POTASSIUM SERPL-SCNC: 4.1 MMOL/L (ref 3.7–5.3)
RBC # BLD AUTO: 2.89 M/UL (ref 4.21–5.77)
SODIUM SERPL-SCNC: 129 MMOL/L (ref 135–144)
SODIUM SERPL-SCNC: 133 MMOL/L (ref 135–144)
SODIUM SERPL-SCNC: 133 MMOL/L (ref 135–144)
WBC OTHER # BLD: 6.4 K/UL (ref 3.5–11.3)

## 2023-08-01 PROCEDURE — 6360000002 HC RX W HCPCS: Performed by: INTERNAL MEDICINE

## 2023-08-01 PROCEDURE — 97535 SELF CARE MNGMENT TRAINING: CPT

## 2023-08-01 PROCEDURE — 82330 ASSAY OF CALCIUM: CPT

## 2023-08-01 PROCEDURE — 97530 THERAPEUTIC ACTIVITIES: CPT

## 2023-08-01 PROCEDURE — 97116 GAIT TRAINING THERAPY: CPT

## 2023-08-01 PROCEDURE — 6370000000 HC RX 637 (ALT 250 FOR IP): Performed by: PHYSICIAN ASSISTANT

## 2023-08-01 PROCEDURE — 85027 COMPLETE CBC AUTOMATED: CPT

## 2023-08-01 PROCEDURE — 6370000000 HC RX 637 (ALT 250 FOR IP): Performed by: STUDENT IN AN ORGANIZED HEALTH CARE EDUCATION/TRAINING PROGRAM

## 2023-08-01 PROCEDURE — 36415 COLL VENOUS BLD VENIPUNCTURE: CPT

## 2023-08-01 PROCEDURE — 71045 X-RAY EXAM CHEST 1 VIEW: CPT

## 2023-08-01 PROCEDURE — 2500000003 HC RX 250 WO HCPCS: Performed by: PHYSICIAN ASSISTANT

## 2023-08-01 PROCEDURE — 2100000001 HC CVICU R&B

## 2023-08-01 PROCEDURE — 2580000003 HC RX 258: Performed by: PHYSICIAN ASSISTANT

## 2023-08-01 PROCEDURE — 99232 SBSQ HOSP IP/OBS MODERATE 35: CPT | Performed by: INTERNAL MEDICINE

## 2023-08-01 PROCEDURE — 93010 ELECTROCARDIOGRAM REPORT: CPT | Performed by: INTERNAL MEDICINE

## 2023-08-01 PROCEDURE — 97110 THERAPEUTIC EXERCISES: CPT

## 2023-08-01 PROCEDURE — 6370000000 HC RX 637 (ALT 250 FOR IP): Performed by: THORACIC SURGERY (CARDIOTHORACIC VASCULAR SURGERY)

## 2023-08-01 PROCEDURE — 82947 ASSAY GLUCOSE BLOOD QUANT: CPT

## 2023-08-01 PROCEDURE — 80048 BASIC METABOLIC PNL TOTAL CA: CPT

## 2023-08-01 PROCEDURE — 99024 POSTOP FOLLOW-UP VISIT: CPT | Performed by: NURSE PRACTITIONER

## 2023-08-01 PROCEDURE — 6360000002 HC RX W HCPCS: Performed by: PHYSICIAN ASSISTANT

## 2023-08-01 PROCEDURE — 83735 ASSAY OF MAGNESIUM: CPT

## 2023-08-01 RX ORDER — ALLOPURINOL 300 MG/1
300 TABLET ORAL DAILY
Status: DISCONTINUED | OUTPATIENT
Start: 2023-08-02 | End: 2023-08-06 | Stop reason: HOSPADM

## 2023-08-01 RX ORDER — FUROSEMIDE 40 MG/1
40 TABLET ORAL DAILY
Status: DISCONTINUED | OUTPATIENT
Start: 2023-08-01 | End: 2023-08-01

## 2023-08-01 RX ORDER — FUROSEMIDE 40 MG/1
40 TABLET ORAL DAILY
Status: DISCONTINUED | OUTPATIENT
Start: 2023-08-02 | End: 2023-08-04

## 2023-08-01 RX ORDER — SODIUM CHLORIDE 1 G/1
1 TABLET ORAL
Status: DISCONTINUED | OUTPATIENT
Start: 2023-08-01 | End: 2023-08-01

## 2023-08-01 RX ORDER — AMLODIPINE BESYLATE 5 MG/1
5 TABLET ORAL DAILY
Status: DISCONTINUED | OUTPATIENT
Start: 2023-08-01 | End: 2023-08-06 | Stop reason: HOSPADM

## 2023-08-01 RX ADMIN — OXYCODONE HYDROCHLORIDE AND ACETAMINOPHEN 2 TABLET: 5; 325 TABLET ORAL at 06:20

## 2023-08-01 RX ADMIN — SODIUM CHLORIDE: 9 INJECTION, SOLUTION INTRAVENOUS at 06:24

## 2023-08-01 RX ADMIN — SENNOSIDES AND DOCUSATE SODIUM 1 TABLET: 50; 8.6 TABLET ORAL at 09:48

## 2023-08-01 RX ADMIN — Medication 5 MG: at 20:27

## 2023-08-01 RX ADMIN — AMLODIPINE BESYLATE 5 MG: 5 TABLET ORAL at 09:48

## 2023-08-01 RX ADMIN — METOPROLOL TARTRATE 12.5 MG: 25 TABLET, FILM COATED ORAL at 20:27

## 2023-08-01 RX ADMIN — TAMSULOSIN HYDROCHLORIDE 0.4 MG: 0.4 CAPSULE ORAL at 09:49

## 2023-08-01 RX ADMIN — OXYCODONE HYDROCHLORIDE AND ACETAMINOPHEN 1 TABLET: 5; 325 TABLET ORAL at 20:21

## 2023-08-01 RX ADMIN — POLYETHYLENE GLYCOL 3350 17 G: 17 POWDER, FOR SOLUTION ORAL at 09:47

## 2023-08-01 RX ADMIN — FENTANYL CITRATE 25 MCG: 50 INJECTION, SOLUTION INTRAMUSCULAR; INTRAVENOUS at 20:21

## 2023-08-01 RX ADMIN — ASPIRIN 81 MG 81 MG: 81 TABLET ORAL at 09:48

## 2023-08-01 RX ADMIN — SODIUM CHLORIDE, PRESERVATIVE FREE 10 ML: 5 INJECTION INTRAVENOUS at 09:49

## 2023-08-01 RX ADMIN — SENNOSIDES AND DOCUSATE SODIUM 1 TABLET: 50; 8.6 TABLET ORAL at 20:22

## 2023-08-01 RX ADMIN — FUROSEMIDE 20 MG: 10 INJECTION, SOLUTION INTRAMUSCULAR; INTRAVENOUS at 09:48

## 2023-08-01 RX ADMIN — SODIUM CHLORIDE, PRESERVATIVE FREE 10 ML: 5 INJECTION INTRAVENOUS at 21:06

## 2023-08-01 RX ADMIN — DIPHENHYDRAMINE HYDROCHLORIDE 25 MG: 25 TABLET ORAL at 20:27

## 2023-08-01 RX ADMIN — CLOPIDOGREL BISULFATE 75 MG: 75 TABLET ORAL at 09:48

## 2023-08-01 RX ADMIN — ATORVASTATIN CALCIUM 20 MG: 40 TABLET, FILM COATED ORAL at 20:21

## 2023-08-01 RX ADMIN — INSULIN GLARGINE 10 UNITS: 100 INJECTION, SOLUTION SUBCUTANEOUS at 21:07

## 2023-08-01 RX ADMIN — METOPROLOL TARTRATE 12.5 MG: 25 TABLET, FILM COATED ORAL at 09:48

## 2023-08-01 RX ADMIN — OXYCODONE HYDROCHLORIDE AND ACETAMINOPHEN 2 TABLET: 5; 325 TABLET ORAL at 12:06

## 2023-08-01 RX ADMIN — ONDANSETRON 4 MG: 2 INJECTION INTRAMUSCULAR; INTRAVENOUS at 21:44

## 2023-08-01 RX ADMIN — INSULIN LISPRO 2 UNITS: 100 INJECTION, SOLUTION INTRAVENOUS; SUBCUTANEOUS at 21:07

## 2023-08-01 RX ADMIN — CALCIUM CHLORIDE 1000 MG: 100 INJECTION, SOLUTION INTRAVENOUS at 06:24

## 2023-08-01 ASSESSMENT — PAIN SCALES - GENERAL
PAINLEVEL_OUTOF10: 6
PAINLEVEL_OUTOF10: 8
PAINLEVEL_OUTOF10: 5
PAINLEVEL_OUTOF10: 0
PAINLEVEL_OUTOF10: 5
PAINLEVEL_OUTOF10: 0
PAINLEVEL_OUTOF10: 2

## 2023-08-01 ASSESSMENT — PAIN DESCRIPTION - LOCATION: LOCATION: HEAD;CHEST

## 2023-08-01 ASSESSMENT — PAIN DESCRIPTION - DESCRIPTORS: DESCRIPTORS: DISCOMFORT;NAGGING

## 2023-08-01 NOTE — CARE COORDINATION
Spoke with [facility] this morning, states precert requiring W9K due to pt's acuity being to high. Called and spoke with Roger Qiu states no need to do P2P, pt not ready for discharge at this time. Await decision pacemaker vs life vest. Precert will need to be restarted once pt is stable. Attempted to updated [address], no answer, left VM asking for call back.

## 2023-08-01 NOTE — FLOWSHEET NOTE
RN started a 250ml fluid challenge per CT surgery and Cardiology. 0.9% saline was started at 125ml/hr per order at 2137. Rn went in to check on patient at 2300 and she noted that patients lung sounds where wet sounding. Rhonchi. RN decreased rate of fluids to 25ml/hr and rechecked patient in 15 min to see improvement in lung sounds.  IS was encouraged and patient was ambulated to the chair per his request.

## 2023-08-02 ENCOUNTER — APPOINTMENT (OUTPATIENT)
Dept: GENERAL RADIOLOGY | Age: 83
DRG: 235 | End: 2023-08-02
Attending: INTERNAL MEDICINE
Payer: MEDICARE

## 2023-08-02 LAB
ANION GAP SERPL CALCULATED.3IONS-SCNC: 12 MMOL/L (ref 9–17)
BUN SERPL-MCNC: 35 MG/DL (ref 8–23)
CA-I BLD-SCNC: 1.13 MMOL/L (ref 1.13–1.33)
CALCIUM SERPL-MCNC: 9.4 MG/DL (ref 8.6–10.4)
CHLORIDE SERPL-SCNC: 93 MMOL/L (ref 98–107)
CO2 SERPL-SCNC: 24 MMOL/L (ref 20–31)
CREAT SERPL-MCNC: 1.3 MG/DL (ref 0.7–1.2)
EKG ATRIAL RATE: 70 BPM
EKG P AXIS: 27 DEGREES
EKG P-R INTERVAL: 194 MS
EKG Q-T INTERVAL: 444 MS
EKG QRS DURATION: 160 MS
EKG QTC CALCULATION (BAZETT): 479 MS
EKG R AXIS: -53 DEGREES
EKG T AXIS: 47 DEGREES
EKG VENTRICULAR RATE: 70 BPM
ERYTHROCYTE [DISTWIDTH] IN BLOOD BY AUTOMATED COUNT: 14.7 % (ref 11.8–14.4)
GFR SERPL CREATININE-BSD FRML MDRD: 55 ML/MIN/1.73M2
GLUCOSE SERPL-MCNC: 100 MG/DL (ref 70–99)
HCT VFR BLD AUTO: 28.4 % (ref 40.7–50.3)
HGB BLD-MCNC: 9.3 G/DL (ref 13–17)
MAGNESIUM SERPL-MCNC: 1.9 MG/DL (ref 1.6–2.6)
MCH RBC QN AUTO: 31 PG (ref 25.2–33.5)
MCHC RBC AUTO-ENTMCNC: 32.7 G/DL (ref 28.4–34.8)
MCV RBC AUTO: 94.7 FL (ref 82.6–102.9)
METER GLUCOSE: 117 MG/DL (ref 75–110)
METER GLUCOSE: 145 MG/DL (ref 75–110)
METER GLUCOSE: 233 MG/DL (ref 75–110)
METER GLUCOSE: 94 MG/DL (ref 75–110)
NRBC BLD-RTO: 0 PER 100 WBC
PLATELET # BLD AUTO: 259 K/UL (ref 138–453)
PMV BLD AUTO: 8.9 FL (ref 8.1–13.5)
POTASSIUM SERPL-SCNC: 4.2 MMOL/L (ref 3.7–5.3)
RBC # BLD AUTO: 3 M/UL (ref 4.21–5.77)
SODIUM SERPL-SCNC: 129 MMOL/L (ref 135–144)
WBC OTHER # BLD: 7.8 K/UL (ref 3.5–11.3)

## 2023-08-02 PROCEDURE — 6370000000 HC RX 637 (ALT 250 FOR IP): Performed by: PHYSICIAN ASSISTANT

## 2023-08-02 PROCEDURE — 83735 ASSAY OF MAGNESIUM: CPT

## 2023-08-02 PROCEDURE — 36415 COLL VENOUS BLD VENIPUNCTURE: CPT

## 2023-08-02 PROCEDURE — 6370000000 HC RX 637 (ALT 250 FOR IP): Performed by: NURSE PRACTITIONER

## 2023-08-02 PROCEDURE — 93010 ELECTROCARDIOGRAM REPORT: CPT | Performed by: INTERNAL MEDICINE

## 2023-08-02 PROCEDURE — 97530 THERAPEUTIC ACTIVITIES: CPT

## 2023-08-02 PROCEDURE — 99024 POSTOP FOLLOW-UP VISIT: CPT | Performed by: NURSE PRACTITIONER

## 2023-08-02 PROCEDURE — 6360000002 HC RX W HCPCS: Performed by: PHYSICIAN ASSISTANT

## 2023-08-02 PROCEDURE — 2580000003 HC RX 258: Performed by: NURSE PRACTITIONER

## 2023-08-02 PROCEDURE — 6370000000 HC RX 637 (ALT 250 FOR IP): Performed by: STUDENT IN AN ORGANIZED HEALTH CARE EDUCATION/TRAINING PROGRAM

## 2023-08-02 PROCEDURE — 82330 ASSAY OF CALCIUM: CPT

## 2023-08-02 PROCEDURE — 2140000001 HC CVICU INTERMEDIATE R&B

## 2023-08-02 PROCEDURE — 82947 ASSAY GLUCOSE BLOOD QUANT: CPT

## 2023-08-02 PROCEDURE — 6370000000 HC RX 637 (ALT 250 FOR IP): Performed by: THORACIC SURGERY (CARDIOTHORACIC VASCULAR SURGERY)

## 2023-08-02 PROCEDURE — 85027 COMPLETE CBC AUTOMATED: CPT

## 2023-08-02 PROCEDURE — 2580000003 HC RX 258: Performed by: PHYSICIAN ASSISTANT

## 2023-08-02 PROCEDURE — 99233 SBSQ HOSP IP/OBS HIGH 50: CPT | Performed by: INTERNAL MEDICINE

## 2023-08-02 PROCEDURE — 71045 X-RAY EXAM CHEST 1 VIEW: CPT

## 2023-08-02 PROCEDURE — 97535 SELF CARE MNGMENT TRAINING: CPT

## 2023-08-02 PROCEDURE — P9041 ALBUMIN (HUMAN),5%, 50ML: HCPCS | Performed by: PHYSICIAN ASSISTANT

## 2023-08-02 PROCEDURE — 80048 BASIC METABOLIC PNL TOTAL CA: CPT

## 2023-08-02 RX ORDER — COLCHICINE 0.6 MG/1
0.6 TABLET ORAL DAILY
Status: CANCELLED | OUTPATIENT
Start: 2023-08-02

## 2023-08-02 RX ORDER — COLCHICINE 0.6 MG/1
0.6 TABLET ORAL DAILY
Status: DISCONTINUED | OUTPATIENT
Start: 2023-08-02 | End: 2023-08-06 | Stop reason: HOSPADM

## 2023-08-02 RX ORDER — SODIUM CHLORIDE 9 MG/ML
INJECTION, SOLUTION INTRAVENOUS CONTINUOUS
Status: DISCONTINUED | OUTPATIENT
Start: 2023-08-02 | End: 2023-08-06 | Stop reason: HOSPADM

## 2023-08-02 RX ORDER — LORAZEPAM 0.5 MG/1
0.5 TABLET ORAL ONCE
Status: COMPLETED | OUTPATIENT
Start: 2023-08-02 | End: 2023-08-02

## 2023-08-02 RX ORDER — SODIUM CHLORIDE 1 G/1
1 TABLET ORAL 2 TIMES DAILY
Status: DISCONTINUED | OUTPATIENT
Start: 2023-08-02 | End: 2023-08-06 | Stop reason: HOSPADM

## 2023-08-02 RX ADMIN — ALLOPURINOL 300 MG: 300 TABLET ORAL at 00:20

## 2023-08-02 RX ADMIN — FENTANYL CITRATE 25 MCG: 50 INJECTION, SOLUTION INTRAMUSCULAR; INTRAVENOUS at 17:20

## 2023-08-02 RX ADMIN — FENTANYL CITRATE 25 MCG: 50 INJECTION, SOLUTION INTRAMUSCULAR; INTRAVENOUS at 03:58

## 2023-08-02 RX ADMIN — ASPIRIN 81 MG 81 MG: 81 TABLET ORAL at 10:00

## 2023-08-02 RX ADMIN — LORAZEPAM 0.5 MG: 0.5 TABLET ORAL at 16:31

## 2023-08-02 RX ADMIN — OXYCODONE HYDROCHLORIDE AND ACETAMINOPHEN 1 TABLET: 5; 325 TABLET ORAL at 14:11

## 2023-08-02 RX ADMIN — SENNOSIDES AND DOCUSATE SODIUM 1 TABLET: 50; 8.6 TABLET ORAL at 10:00

## 2023-08-02 RX ADMIN — ATORVASTATIN CALCIUM 20 MG: 40 TABLET, FILM COATED ORAL at 20:34

## 2023-08-02 RX ADMIN — TAMSULOSIN HYDROCHLORIDE 0.4 MG: 0.4 CAPSULE ORAL at 10:22

## 2023-08-02 RX ADMIN — SODIUM CHLORIDE, PRESERVATIVE FREE 10 ML: 5 INJECTION INTRAVENOUS at 20:36

## 2023-08-02 RX ADMIN — ALBUMIN (HUMAN) 25 G: 12.5 INJECTION, SOLUTION INTRAVENOUS at 13:48

## 2023-08-02 RX ADMIN — POLYETHYLENE GLYCOL 3350 17 G: 17 POWDER, FOR SOLUTION ORAL at 10:01

## 2023-08-02 RX ADMIN — INSULIN LISPRO 1 UNITS: 100 INJECTION, SOLUTION INTRAVENOUS; SUBCUTANEOUS at 16:57

## 2023-08-02 RX ADMIN — INSULIN GLARGINE 10 UNITS: 100 INJECTION, SOLUTION SUBCUTANEOUS at 20:41

## 2023-08-02 RX ADMIN — SODIUM CHLORIDE TAB 1 GM 1 G: 1 TAB at 10:52

## 2023-08-02 RX ADMIN — SODIUM CHLORIDE: 9 INJECTION, SOLUTION INTRAVENOUS at 16:54

## 2023-08-02 RX ADMIN — INSULIN LISPRO 1 UNITS: 100 INJECTION, SOLUTION INTRAVENOUS; SUBCUTANEOUS at 20:41

## 2023-08-02 RX ADMIN — COLCHICINE 0.6 MG: 0.6 TABLET, FILM COATED ORAL at 11:00

## 2023-08-02 RX ADMIN — AMLODIPINE BESYLATE 5 MG: 5 TABLET ORAL at 09:59

## 2023-08-02 RX ADMIN — CLOPIDOGREL BISULFATE 75 MG: 75 TABLET ORAL at 10:00

## 2023-08-02 RX ADMIN — OXYCODONE HYDROCHLORIDE AND ACETAMINOPHEN 1 TABLET: 5; 325 TABLET ORAL at 06:50

## 2023-08-02 RX ADMIN — OXYCODONE HYDROCHLORIDE AND ACETAMINOPHEN 2 TABLET: 5; 325 TABLET ORAL at 18:48

## 2023-08-02 RX ADMIN — FENTANYL CITRATE 25 MCG: 50 INJECTION, SOLUTION INTRAMUSCULAR; INTRAVENOUS at 07:50

## 2023-08-02 RX ADMIN — METOPROLOL TARTRATE 12.5 MG: 25 TABLET, FILM COATED ORAL at 20:34

## 2023-08-02 RX ADMIN — Medication 5 MG: at 20:33

## 2023-08-02 RX ADMIN — SODIUM CHLORIDE, PRESERVATIVE FREE 10 ML: 5 INJECTION INTRAVENOUS at 10:22

## 2023-08-02 RX ADMIN — SENNOSIDES AND DOCUSATE SODIUM 1 TABLET: 50; 8.6 TABLET ORAL at 20:35

## 2023-08-02 RX ADMIN — MAGNESIUM SULFATE HEPTAHYDRATE 2000 MG: 40 INJECTION, SOLUTION INTRAVENOUS at 03:58

## 2023-08-02 RX ADMIN — SODIUM CHLORIDE TAB 1 GM 1 G: 1 TAB at 20:36

## 2023-08-02 ASSESSMENT — PAIN DESCRIPTION - LOCATION
LOCATION: FOOT

## 2023-08-02 ASSESSMENT — PAIN SCALES - GENERAL
PAINLEVEL_OUTOF10: 5
PAINLEVEL_OUTOF10: 5
PAINLEVEL_OUTOF10: 10

## 2023-08-02 ASSESSMENT — PAIN DESCRIPTION - ORIENTATION: ORIENTATION: LEFT;RIGHT

## 2023-08-02 ASSESSMENT — PAIN DESCRIPTION - PAIN TYPE: TYPE: CHRONIC PAIN

## 2023-08-02 NOTE — CARE COORDINATION
Plan for PPM placement prior to discharge, transition plan is to [facility], will need pre-cert started after PPM is placed.

## 2023-08-03 ENCOUNTER — APPOINTMENT (OUTPATIENT)
Dept: GENERAL RADIOLOGY | Age: 83
DRG: 235 | End: 2023-08-03
Attending: INTERNAL MEDICINE
Payer: MEDICARE

## 2023-08-03 PROBLEM — R00.1 BRADYCARDIA: Status: ACTIVE | Noted: 2023-08-03

## 2023-08-03 PROBLEM — I49.5 TACHY-BRADY SYNDROME (HCC): Status: ACTIVE | Noted: 2023-08-03

## 2023-08-03 LAB
ANION GAP SERPL CALCULATED.3IONS-SCNC: 13 MMOL/L (ref 9–17)
BUN SERPL-MCNC: 29 MG/DL (ref 8–23)
CA-I BLD-SCNC: 1.22 MMOL/L (ref 1.13–1.33)
CALCIUM SERPL-MCNC: 9 MG/DL (ref 8.6–10.4)
CHLORIDE SERPL-SCNC: 98 MMOL/L (ref 98–107)
CO2 SERPL-SCNC: 24 MMOL/L (ref 20–31)
CREAT SERPL-MCNC: 1.2 MG/DL (ref 0.7–1.2)
ERYTHROCYTE [DISTWIDTH] IN BLOOD BY AUTOMATED COUNT: 14.6 % (ref 11.8–14.4)
GFR SERPL CREATININE-BSD FRML MDRD: >60 ML/MIN/1.73M2
GLUCOSE SERPL-MCNC: 114 MG/DL (ref 70–99)
HCT VFR BLD AUTO: 26.6 % (ref 40.7–50.3)
HGB BLD-MCNC: 8.3 G/DL (ref 13–17)
MAGNESIUM SERPL-MCNC: 2.1 MG/DL (ref 1.6–2.6)
MCH RBC QN AUTO: 30 PG (ref 25.2–33.5)
MCHC RBC AUTO-ENTMCNC: 31.2 G/DL (ref 28.4–34.8)
MCV RBC AUTO: 96 FL (ref 82.6–102.9)
METER GLUCOSE: 100 MG/DL (ref 75–110)
METER GLUCOSE: 121 MG/DL (ref 75–110)
METER GLUCOSE: 132 MG/DL (ref 75–110)
METER GLUCOSE: 232 MG/DL (ref 75–110)
NRBC BLD-RTO: 0 PER 100 WBC
PLATELET # BLD AUTO: 301 K/UL (ref 138–453)
PMV BLD AUTO: 8.9 FL (ref 8.1–13.5)
POTASSIUM SERPL-SCNC: 4.1 MMOL/L (ref 3.7–5.3)
RBC # BLD AUTO: 2.77 M/UL (ref 4.21–5.77)
SODIUM SERPL-SCNC: 135 MMOL/L (ref 135–144)
WBC OTHER # BLD: 7.3 K/UL (ref 3.5–11.3)

## 2023-08-03 PROCEDURE — C1892 INTRO/SHEATH,FIXED,PEEL-AWAY: HCPCS | Performed by: INTERNAL MEDICINE

## 2023-08-03 PROCEDURE — 2580000003 HC RX 258: Performed by: INTERNAL MEDICINE

## 2023-08-03 PROCEDURE — 6370000000 HC RX 637 (ALT 250 FOR IP): Performed by: STUDENT IN AN ORGANIZED HEALTH CARE EDUCATION/TRAINING PROGRAM

## 2023-08-03 PROCEDURE — 6360000002 HC RX W HCPCS: Performed by: INTERNAL MEDICINE

## 2023-08-03 PROCEDURE — 99153 MOD SED SAME PHYS/QHP EA: CPT | Performed by: INTERNAL MEDICINE

## 2023-08-03 PROCEDURE — 83735 ASSAY OF MAGNESIUM: CPT

## 2023-08-03 PROCEDURE — 2709999900 HC NON-CHARGEABLE SUPPLY: Performed by: INTERNAL MEDICINE

## 2023-08-03 PROCEDURE — 85027 COMPLETE CBC AUTOMATED: CPT

## 2023-08-03 PROCEDURE — 99233 SBSQ HOSP IP/OBS HIGH 50: CPT | Performed by: INTERNAL MEDICINE

## 2023-08-03 PROCEDURE — 6370000000 HC RX 637 (ALT 250 FOR IP): Performed by: PHYSICIAN ASSISTANT

## 2023-08-03 PROCEDURE — 71045 X-RAY EXAM CHEST 1 VIEW: CPT

## 2023-08-03 PROCEDURE — 6370000000 HC RX 637 (ALT 250 FOR IP): Performed by: NURSE PRACTITIONER

## 2023-08-03 PROCEDURE — 82330 ASSAY OF CALCIUM: CPT

## 2023-08-03 PROCEDURE — 99024 POSTOP FOLLOW-UP VISIT: CPT | Performed by: NURSE PRACTITIONER

## 2023-08-03 PROCEDURE — 33208 INSRT HEART PM ATRIAL & VENT: CPT | Performed by: INTERNAL MEDICINE

## 2023-08-03 PROCEDURE — 2580000003 HC RX 258: Performed by: PHYSICIAN ASSISTANT

## 2023-08-03 PROCEDURE — 97530 THERAPEUTIC ACTIVITIES: CPT

## 2023-08-03 PROCEDURE — 2709999900 HC NON-CHARGEABLE SUPPLY

## 2023-08-03 PROCEDURE — 2140000001 HC CVICU INTERMEDIATE R&B

## 2023-08-03 PROCEDURE — 2500000003 HC RX 250 WO HCPCS: Performed by: INTERNAL MEDICINE

## 2023-08-03 PROCEDURE — 02H63JZ INSERTION OF PACEMAKER LEAD INTO RIGHT ATRIUM, PERCUTANEOUS APPROACH: ICD-10-PCS | Performed by: INTERNAL MEDICINE

## 2023-08-03 PROCEDURE — 36415 COLL VENOUS BLD VENIPUNCTURE: CPT

## 2023-08-03 PROCEDURE — C1785 PMKR, DUAL, RATE-RESP: HCPCS | Performed by: INTERNAL MEDICINE

## 2023-08-03 PROCEDURE — 0JH606Z INSERTION OF PACEMAKER, DUAL CHAMBER INTO CHEST SUBCUTANEOUS TISSUE AND FASCIA, OPEN APPROACH: ICD-10-PCS | Performed by: INTERNAL MEDICINE

## 2023-08-03 PROCEDURE — 82947 ASSAY GLUCOSE BLOOD QUANT: CPT

## 2023-08-03 PROCEDURE — 97110 THERAPEUTIC EXERCISES: CPT

## 2023-08-03 PROCEDURE — 80048 BASIC METABOLIC PNL TOTAL CA: CPT

## 2023-08-03 PROCEDURE — 2060000000 HC ICU INTERMEDIATE R&B

## 2023-08-03 PROCEDURE — 02HK3JZ INSERTION OF PACEMAKER LEAD INTO RIGHT VENTRICLE, PERCUTANEOUS APPROACH: ICD-10-PCS | Performed by: INTERNAL MEDICINE

## 2023-08-03 PROCEDURE — 99232 SBSQ HOSP IP/OBS MODERATE 35: CPT | Performed by: INTERNAL MEDICINE

## 2023-08-03 PROCEDURE — 99152 MOD SED SAME PHYS/QHP 5/>YRS: CPT | Performed by: INTERNAL MEDICINE

## 2023-08-03 PROCEDURE — C1898 LEAD, PMKR, OTHER THAN TRANS: HCPCS | Performed by: INTERNAL MEDICINE

## 2023-08-03 DEVICE — LEAD PACE AD 6FR L58CM VENT SIL PLAT INSUL BPLR PLATINIZED 507658] MEDTRONIC CRM]: Type: IMPLANTABLE DEVICE | Status: FUNCTIONAL

## 2023-08-03 DEVICE — IPG W1DR01 AZURE XT DR MRI USA
Type: IMPLANTABLE DEVICE | Status: FUNCTIONAL
Brand: AZURE™ XT DR MRI SURESCAN™

## 2023-08-03 DEVICE — LEAD PACE 6FR L52CM RNG ELECTRD DIA2MM PLATINIZED HELIX SIL: Type: IMPLANTABLE DEVICE | Status: FUNCTIONAL

## 2023-08-03 RX ORDER — AMIODARONE HYDROCHLORIDE 200 MG/1
200 TABLET ORAL 2 TIMES DAILY
Status: DISCONTINUED | OUTPATIENT
Start: 2023-08-03 | End: 2023-08-06 | Stop reason: HOSPADM

## 2023-08-03 RX ORDER — MIDAZOLAM HYDROCHLORIDE 1 MG/ML
INJECTION INTRAMUSCULAR; INTRAVENOUS PRN
Status: DISCONTINUED | OUTPATIENT
Start: 2023-08-03 | End: 2023-08-03 | Stop reason: HOSPADM

## 2023-08-03 RX ORDER — LORAZEPAM 0.5 MG/1
0.5 TABLET ORAL ONCE
Status: COMPLETED | OUTPATIENT
Start: 2023-08-03 | End: 2023-08-03

## 2023-08-03 RX ORDER — FENTANYL CITRATE 50 UG/ML
INJECTION, SOLUTION INTRAMUSCULAR; INTRAVENOUS PRN
Status: DISCONTINUED | OUTPATIENT
Start: 2023-08-03 | End: 2023-08-03 | Stop reason: HOSPADM

## 2023-08-03 RX ADMIN — OXYCODONE HYDROCHLORIDE AND ACETAMINOPHEN 2 TABLET: 5; 325 TABLET ORAL at 21:03

## 2023-08-03 RX ADMIN — OXYCODONE HYDROCHLORIDE AND ACETAMINOPHEN 2 TABLET: 5; 325 TABLET ORAL at 02:23

## 2023-08-03 RX ADMIN — METOPROLOL TARTRATE 12.5 MG: 25 TABLET, FILM COATED ORAL at 21:51

## 2023-08-03 RX ADMIN — SENNOSIDES AND DOCUSATE SODIUM 1 TABLET: 50; 8.6 TABLET ORAL at 21:58

## 2023-08-03 RX ADMIN — SODIUM CHLORIDE, PRESERVATIVE FREE 10 ML: 5 INJECTION INTRAVENOUS at 21:53

## 2023-08-03 RX ADMIN — ALLOPURINOL 300 MG: 300 TABLET ORAL at 08:25

## 2023-08-03 RX ADMIN — INSULIN GLARGINE 10 UNITS: 100 INJECTION, SOLUTION SUBCUTANEOUS at 21:52

## 2023-08-03 RX ADMIN — SENNOSIDES AND DOCUSATE SODIUM 1 TABLET: 50; 8.6 TABLET ORAL at 08:25

## 2023-08-03 RX ADMIN — COLCHICINE 0.6 MG: 0.6 TABLET, FILM COATED ORAL at 08:25

## 2023-08-03 RX ADMIN — Medication 5 MG: at 21:03

## 2023-08-03 RX ADMIN — VANCOMYCIN HYDROCHLORIDE 1000 MG: 1 INJECTION, POWDER, LYOPHILIZED, FOR SOLUTION INTRAVENOUS at 12:54

## 2023-08-03 RX ADMIN — LORAZEPAM 0.5 MG: 0.5 TABLET ORAL at 05:13

## 2023-08-03 RX ADMIN — AMIODARONE HYDROCHLORIDE 200 MG: 200 TABLET ORAL at 21:51

## 2023-08-03 RX ADMIN — SODIUM CHLORIDE TAB 1 GM 1 G: 1 TAB at 21:50

## 2023-08-03 RX ADMIN — OXYCODONE HYDROCHLORIDE AND ACETAMINOPHEN 2 TABLET: 5; 325 TABLET ORAL at 15:27

## 2023-08-03 RX ADMIN — ATORVASTATIN CALCIUM 20 MG: 40 TABLET, FILM COATED ORAL at 21:50

## 2023-08-03 RX ADMIN — INSULIN LISPRO 1 UNITS: 100 INJECTION, SOLUTION INTRAVENOUS; SUBCUTANEOUS at 21:53

## 2023-08-03 RX ADMIN — AMIODARONE HYDROCHLORIDE 200 MG: 200 TABLET ORAL at 08:25

## 2023-08-03 RX ADMIN — SODIUM CHLORIDE TAB 1 GM 1 G: 1 TAB at 08:25

## 2023-08-03 RX ADMIN — SODIUM CHLORIDE, PRESERVATIVE FREE 10 ML: 5 INJECTION INTRAVENOUS at 08:25

## 2023-08-03 RX ADMIN — OXYCODONE HYDROCHLORIDE AND ACETAMINOPHEN 2 TABLET: 5; 325 TABLET ORAL at 06:10

## 2023-08-03 RX ADMIN — TAMSULOSIN HYDROCHLORIDE 0.4 MG: 0.4 CAPSULE ORAL at 08:25

## 2023-08-03 ASSESSMENT — PAIN SCALES - GENERAL
PAINLEVEL_OUTOF10: 4
PAINLEVEL_OUTOF10: 7
PAINLEVEL_OUTOF10: 8
PAINLEVEL_OUTOF10: 5

## 2023-08-03 ASSESSMENT — PAIN DESCRIPTION - ORIENTATION
ORIENTATION: LEFT;MID;LOWER
ORIENTATION: LEFT;RIGHT
ORIENTATION: LEFT;RIGHT

## 2023-08-03 ASSESSMENT — PAIN DESCRIPTION - PAIN TYPE: TYPE: SURGICAL PAIN

## 2023-08-03 ASSESSMENT — PAIN DESCRIPTION - LOCATION
LOCATION: INCISION
LOCATION: FOOT
LOCATION: FOOT

## 2023-08-03 ASSESSMENT — PAIN DESCRIPTION - ONSET: ONSET: ON-GOING

## 2023-08-03 ASSESSMENT — PAIN DESCRIPTION - FREQUENCY: FREQUENCY: CONTINUOUS

## 2023-08-03 ASSESSMENT — PAIN DESCRIPTION - DESCRIPTORS: DESCRIPTORS: ACHING;SORE

## 2023-08-03 ASSESSMENT — PAIN - FUNCTIONAL ASSESSMENT: PAIN_FUNCTIONAL_ASSESSMENT: ACTIVITIES ARE NOT PREVENTED

## 2023-08-03 NOTE — CARE COORDINATION
Pt to have PPM placed this afternoon, writer placed call to Meghan at [facility], left message. 1133 - Per [facility], they are still able to accept pt, will start pre-cert this afternoon after pt's PPM is placed. 80 - Writer updated pt and family that [facility] is still able to accept, will start pre-cert.

## 2023-08-03 NOTE — PROCEDURES
[facility]     Electrophysiology Device Implant                Patient Info:  Elvis Buchanan (89 y.o., male)  1940    Date: 8/3/2023        Indication: Complete Heart Block    Surgeon: Dr. Valentin Locke. Óscar        Procedures:  Sedation and monitoring  Permanent Pacemaker implant  Fluoroscopic guided  Fluoroscopy interpretation  Mild sedation and monitoring  New pocket creation  Pacemaker implantation  RV lead implant  RA lead placement  Intra operative RV lead testing  Intra operative RA lead testing  Pacemaker testing before, during, and after procedure  Skin closure  Fluoro time: 5-7 min      Pre Procedure Conscious Sedation Data:    ASA Class:    [] I [x] II [] III [] IV    Mallampati Class:  [] I [x] II [] III [] IV     Pacemaker: Pre procedure review    Documentation to support the medical necessity of procedure. Complete heart block with no intrinsic escape rhythm. HR 30 bpm    Reason for placement:     [x] Initial [] Recall/Malfunction  [] Upgrade  [] Beyond useful life limit        Device / Data: [employer]     Model # Serial #   Pacer [id number]   RA-Lead [id number]   RV-Lead [id number]            ATRIUM R VENT    P waves 1.1 mV R waves: 4.3 mV    Threshold: Threshold:    Impedance: Impedance:          Description  After the usual preparation of the left neck and chest with chlohexidine prep and ioban over skin, the patient was draped in the usual sterile manner. Local anesthesia was infused into the subcutaneous skin and below the left clavicle from the midline laterally. Micropuncture guided vein acces and fluoroscopic guidance. After micropuncture wire was passed, micropuncture sheath was advanced, large wire advanced and micropuncture sheath exchanged for 7Fr pacer lead sheath. An incision was made inferior and parallel to the clavicle. The incision was carried down to the fascia, dissecting the tissue to the muscle.     Two wires were passed through the 7

## 2023-08-03 NOTE — CARE COORDINATION
I have made an attempt to call Pilar Yarbrough with no answer. Family member requesting update.  Patient is [address] [facility] for info to be updated to Pilar Yarbrough

## 2023-08-03 NOTE — FLOWSHEET NOTE
Report called to [address], no new questions at this time.      Electronically signed by Cristina Jewell RN on 8/3/2023 at 6:21 PM

## 2023-08-03 NOTE — FLOWSHEET NOTE
CTS team notified that when writer changed sternal/pacer dressings that there was purulent/serosanguinous drainage coming from pacer and old chest tube sites.     Electronically signed by Deborah Flores RN on 8/3/2023 at 1:10 PM

## 2023-08-03 NOTE — CONSULTS
[facility]  Inpatient Cardiology Consult             Date:   8/2/2023  Patient name: Chuck Madrid  Date of admission:  7/25/2023  9:47 AM  MRN:   [id number]  YOB: 1940      Reason for Consultation:  PAF; tachy-kelly syndrome    CHIEF COMPLAINT:  Weakness     History Obtained From:  Patient, family and nursing staff    HISTORY OF PRESENT ILLNESS:    The patient is a 80 y.o gentleman with h/o CAD, DM, HTN, HLP who presented last week to [address] [place] [address] ED with recurrent chest pain and dizziness, and was found to have severe multivessel CAD with preserved LVEF. He underwent CABG X3 on 7/26/23 and subsequently has had periods of AF/ RVR as well as sinus bradycardia and pauses up to 10 seconds in duration, requiring epicardial pacing. Amiodarone has been on hold and he is currently tolerating metoprolol at 12.5 mg bid, remaining in sinus rhythm. EKG shows sinus bradycardia with bifasicular block ( RBBB and left axis deviation). Past Medical History:   has a past medical history of Anxiety, CAD (coronary artery disease), GERD (gastroesophageal reflux disease), Gout, Hyperlipidemia, Hypertension, Lymphoma in remission ([address]), and Type II or unspecified type diabetes mellitus without mention of complication, not stated as uncontrolled. Past Surgical History:   has a past surgical history that includes Appendectomy (age 25); Coronary angioplasty with stent; Colonoscopy; back surgery; Carotid endarterectomy (Right); and Coronary artery bypass graft (N/A, 7/26/2023). Home Medications:    Prior to Admission medications    Medication Sig Start Date End Date Taking?  Authorizing Provider   glipiZIDE (GLUCOTROL) 5 MG tablet TAKE 1 TABLET BY MOUTH TWICE A DAY 7/17/23   Belle Renteria, DO   diclofenac sodium (VOLTAREN) 1 % GEL Apply 4 g topically 4 times daily 6/5/23   Belle Renteria, DO   metoprolol succinate (TOPROL XL) 100 MG extended release tablet TAKE 1 TABLET BY MOUTH DAILY 6/5/23
Continuous Infusions:   sodium chloride 25 mL/hr at 08/03/23 0024    sodium chloride      EPINEPHrine      sodium chloride 10 mL/hr at 07/27/23 0502    sodium chloride Stopped (08/01/23 0823)    norepinephrine Stopped (07/27/23 0439)    EPINEPHrine Stopped (07/26/23 1541)    dextrose      sodium chloride      vasopressin (Septic Shock) infusion Stopped (07/27/23 2231)         Labs:     CBC:   Recent Labs     08/01/23  0341 08/02/23  0233 08/03/23  0453   WBC 6.4 7.8 7.3   HGB 8.7* 9.3* 8.3*    259 301     BMP:    Recent Labs     08/01/23  1710 08/02/23  0233 08/03/23  0805   * 129* 135   K 4.1 4.2 4.1   CL 94* 93* 98   CO2 23 24 24   BUN 35* 35* 29*   CREATININE 1.3* 1.3* 1.2   GLUCOSE 245* 100* 114*     Hepatic: No results for input(s): AST, ALT, ALB, BILITOT, ALKPHOS in the last 72 hours. Troponin: No results for input(s): TROPONINI in the last 72 hours. BNP: No results for input(s): BNP in the last 72 hours. Lipids: No results for input(s): CHOL, HDL in the last 72 hours. Invalid input(s): LDLCALCU  INR: No results for input(s): INR in the last 72 hours.     Other active acute problems:  Patient Active Problem List   Diagnosis    Lymphoma in remission ([address])    Hypertension    Anxiety    Type 2 diabetes mellitus with hyperglycemia, without long-term current use of insulin (HCC)    Gout    CAD (coronary artery disease)    GERD (gastroesophageal reflux disease)    Hoarseness of voice    Aphthous stomatitis    Acute pharyngitis    Hyperlipidemia, mixed    Contusion of second toe, left    Urinary frequency    HTN (hypertension)    Hyperlipemia, mixed    Headache    Headache    Stable angina (HCC)    CKD (chronic kidney disease)    Urinary retention    Syncope    Chest pain    Hypotension    Nonspecific ST-T wave electrocardiographic changes    Heart attack (HCC)    CAD, multiple vessel    Multiple vessel coronary artery disease    Arteriosclerosis of arterial coronary artery bypass graft    Sinus
(GLUCOPHAGE) 1000 MG tablet, Take 1 tablet by mouth 2 times daily (with meals)  ibuprofen (ADVIL;MOTRIN) 800 MG tablet, Take 1 tablet by mouth every 8 hours as needed for Pain  tamsulosin (FLOMAX) 0.4 MG capsule, TAKE TWO CAPSULES BY MOUTH DAILY (Patient not taking: Reported on 6/5/2023)  ondansetron (ZOFRAN ODT) 4 MG disintegrating tablet, Take 1 tablet by mouth every 8 hours as needed for Nausea (Patient not taking: Reported on 3/23/2022)  metFORMIN (GLUCOPHAGE) 500 MG tablet, Take 1 tablet by mouth 2 times daily (with meals). (Patient not taking: Reported on 7/29/2022)  allopurinol (ZYLOPRIM) 300 MG tablet, TAKE 1 TABLET BY MOUTH DAILY (Patient not taking: Reported on 3/23/2022)  furosemide (LASIX) 20 MG tablet, Take 1 tablet by mouth daily. (Patient not taking: Reported on 3/23/2022)  docusate sodium (COLACE) 100 MG capsule, Take 1 capsule by mouth daily as needed for Constipation (Patient not taking: Reported on 7/24/2023)  meclizine (ANTIVERT) 25 MG tablet, Take 25 mg by mouth 4 times daily. (Patient not taking: Reported on 3/23/2022)  neomycin-polymyxin-dexameth 3.5-63780-4.1 OINT,   pantoprazole (PROTONIX) 40 MG tablet, Take 1 tablet by mouth daily. ondansetron (ZOFRAN ODT) 4 MG disintegrating tablet, Take 1 tablet by mouth every 8 hours as needed for Nausea or Other (and as needed for dizziness).  (Patient not taking: Reported on 3/23/2022)  aspirin 325 MG tablet, Take 1 tablet by mouth daily    Current Medications:     Scheduled Meds:    furosemide  20 mg IntraVENous BID    And    potassium chloride  10 mEq Oral BID WC    midodrine  5 mg Oral TID WC    [Held by provider] furosemide  20 mg IntraVENous BID    And    [Held by provider] potassium chloride  10 mEq Oral BID WC    sodium chloride flush  5-40 mL IntraVENous 2 times per day    clopidogrel  75 mg Oral Daily    [Held by provider] amiodarone  200 mg Oral TID    mupirocin   Each Nostril BID    polyethylene glycol  17 g Oral Daily    sennosides-docusate
72 ng/mL   Basic Metabolic Panel w/ Reflex to MG    Collection Time: 07/25/23  1:33 AM   Result Value Ref Range    Glucose 137 (H) 70 - 99 mg/dL    BUN 29 (H) 8 - 23 mg/dL    Creatinine 1.4 (H) 0.7 - 1.2 mg/dL    Est, Glom Filt Rate 50 (L) >60 mL/min/1.73m2    Bun/Cre Ratio 21 (H) 9 - 20    Calcium 9.3 8.6 - 10.4 mg/dL    Sodium 137 135 - 144 mmol/L    Potassium 4.0 3.7 - 5.3 mmol/L    Chloride 99 98 - 107 mmol/L    CO2 20 20 - 31 mmol/L    Anion Gap 18 (H) 9 - 17 mmol/L   EKG 12 Lead    Collection Time: 07/25/23  1:37 AM   Result Value Ref Range    Ventricular Rate 65 BPM    Atrial Rate 65 BPM    P-R Interval 202 ms    QRS Duration 162 ms    Q-T Interval 446 ms    QTc Calculation (Bazett) 463 ms    P Axis 36 degrees    R Axis -57 degrees    T Axis 8 degrees   CBC    Collection Time: 07/25/23  1:44 AM   Result Value Ref Range    WBC 9.0 3.5 - 11.3 k/uL    RBC 3.46 (L) 4.21 - 5.77 m/uL    Hemoglobin 10.9 (L) 13.0 - 17.0 g/dL    Hematocrit 32.7 (L) 40.7 - 50.3 %    MCV 94.5 82.6 - 102.9 fL    MCH 31.5 25.2 - 33.5 pg    MCHC 33.3 28.4 - 34.8 g/dL    RDW 12.9 11.8 - 14.4 %    Platelets 773 277 - 722 k/uL    MPV 8.9 8.1 - 13.5 fL    NRBC Automated 0.0 0.0 per 100 WBC   APTT    Collection Time: 07/25/23  1:44 AM   Result Value Ref Range    PTT 23.0 (L) 23.9 - 33.8 sec   Protime-INR    Collection Time: 07/25/23  1:44 AM   Result Value Ref Range    Protime 13.9 11.5 - 14.2 sec    INR 1.1    Anti-Xa, Unfractionated Heparin    Collection Time: 07/25/23  1:44 AM   Result Value Ref Range    Anti-XA Unfrac Heparin <0.10 (L) 0.30 - 0.70 IU/L   Cardiac procedure    Collection Time: 07/25/23  3:53 AM   Result Value Ref Range    Body Surface Area 1.84 m2   Comprehensive Metabolic Panel    Collection Time: 07/25/23 10:37 AM   Result Value Ref Range    Glucose 124 (H) 70 - 99 mg/dL    BUN 27 (H) 8 - 23 mg/dL    Creatinine 1.3 (H) 0.7 - 1.2 mg/dL    Est, Glom Filt Rate 55 (L) >60 mL/min/1.73m2    Calcium 9.1 8.6 - 10.4 mg/dL    Sodium

## 2023-08-04 ENCOUNTER — APPOINTMENT (OUTPATIENT)
Dept: GENERAL RADIOLOGY | Age: 83
DRG: 235 | End: 2023-08-04
Attending: INTERNAL MEDICINE
Payer: MEDICARE

## 2023-08-04 LAB
CA-I BLD-SCNC: 1.17 MMOL/L (ref 1.13–1.33)
ERYTHROCYTE [DISTWIDTH] IN BLOOD BY AUTOMATED COUNT: 14.6 % (ref 11.8–14.4)
HCT VFR BLD AUTO: 27.9 % (ref 40.7–50.3)
HGB BLD-MCNC: 8.9 G/DL (ref 13–17)
MAGNESIUM SERPL-MCNC: 2 MG/DL (ref 1.6–2.6)
MCH RBC QN AUTO: 30.3 PG (ref 25.2–33.5)
MCHC RBC AUTO-ENTMCNC: 31.9 G/DL (ref 28.4–34.8)
MCV RBC AUTO: 94.9 FL (ref 82.6–102.9)
METER GLUCOSE: 137 MG/DL (ref 75–110)
METER GLUCOSE: 141 MG/DL (ref 75–110)
METER GLUCOSE: 172 MG/DL (ref 75–110)
METER GLUCOSE: 181 MG/DL (ref 75–110)
NRBC BLD-RTO: 0 PER 100 WBC
PLATELET # BLD AUTO: 357 K/UL (ref 138–453)
PMV BLD AUTO: 9.1 FL (ref 8.1–13.5)
RBC # BLD AUTO: 2.94 M/UL (ref 4.21–5.77)
WBC OTHER # BLD: 6.7 K/UL (ref 3.5–11.3)

## 2023-08-04 PROCEDURE — 51798 US URINE CAPACITY MEASURE: CPT

## 2023-08-04 PROCEDURE — 83735 ASSAY OF MAGNESIUM: CPT

## 2023-08-04 PROCEDURE — 6370000000 HC RX 637 (ALT 250 FOR IP): Performed by: STUDENT IN AN ORGANIZED HEALTH CARE EDUCATION/TRAINING PROGRAM

## 2023-08-04 PROCEDURE — 2580000003 HC RX 258: Performed by: PHYSICIAN ASSISTANT

## 2023-08-04 PROCEDURE — 6370000000 HC RX 637 (ALT 250 FOR IP): Performed by: NURSE PRACTITIONER

## 2023-08-04 PROCEDURE — 82947 ASSAY GLUCOSE BLOOD QUANT: CPT

## 2023-08-04 PROCEDURE — 6370000000 HC RX 637 (ALT 250 FOR IP): Performed by: PHYSICIAN ASSISTANT

## 2023-08-04 PROCEDURE — 97110 THERAPEUTIC EXERCISES: CPT

## 2023-08-04 PROCEDURE — 82330 ASSAY OF CALCIUM: CPT

## 2023-08-04 PROCEDURE — 85027 COMPLETE CBC AUTOMATED: CPT

## 2023-08-04 PROCEDURE — 71045 X-RAY EXAM CHEST 1 VIEW: CPT

## 2023-08-04 PROCEDURE — 36415 COLL VENOUS BLD VENIPUNCTURE: CPT

## 2023-08-04 PROCEDURE — 2140000001 HC CVICU INTERMEDIATE R&B

## 2023-08-04 PROCEDURE — 97116 GAIT TRAINING THERAPY: CPT

## 2023-08-04 PROCEDURE — 2060000000 HC ICU INTERMEDIATE R&B

## 2023-08-04 PROCEDURE — 99233 SBSQ HOSP IP/OBS HIGH 50: CPT | Performed by: NURSE PRACTITIONER

## 2023-08-04 PROCEDURE — 6370000000 HC RX 637 (ALT 250 FOR IP): Performed by: THORACIC SURGERY (CARDIOTHORACIC VASCULAR SURGERY)

## 2023-08-04 RX ORDER — LORAZEPAM 0.5 MG/1
0.5 TABLET ORAL ONCE
Status: COMPLETED | OUTPATIENT
Start: 2023-08-04 | End: 2023-08-04

## 2023-08-04 RX ORDER — FUROSEMIDE 20 MG/1
20 TABLET ORAL 2 TIMES DAILY
Status: DISCONTINUED | OUTPATIENT
Start: 2023-08-04 | End: 2023-08-06 | Stop reason: HOSPADM

## 2023-08-04 RX ORDER — OXYCODONE HYDROCHLORIDE AND ACETAMINOPHEN 5; 325 MG/1; MG/1
1 TABLET ORAL EVERY 8 HOURS PRN
Qty: 21 TABLET | Refills: 0 | Status: SHIPPED | OUTPATIENT
Start: 2023-08-04 | End: 2023-08-11

## 2023-08-04 RX ORDER — FUROSEMIDE 20 MG/1
20 TABLET ORAL DAILY
Status: DISCONTINUED | OUTPATIENT
Start: 2023-08-04 | End: 2023-08-04

## 2023-08-04 RX ORDER — POTASSIUM CHLORIDE 20 MEQ/1
20 TABLET, EXTENDED RELEASE ORAL DAILY
Status: DISCONTINUED | OUTPATIENT
Start: 2023-08-04 | End: 2023-08-06 | Stop reason: HOSPADM

## 2023-08-04 RX ORDER — ENEMA 19; 7 G/133ML; G/133ML
1 ENEMA RECTAL
Status: ACTIVE | OUTPATIENT
Start: 2023-08-04 | End: 2023-08-05

## 2023-08-04 RX ORDER — BISACODYL 10 MG
10 SUPPOSITORY, RECTAL RECTAL DAILY PRN
Status: DISCONTINUED | OUTPATIENT
Start: 2023-08-04 | End: 2023-08-06 | Stop reason: HOSPADM

## 2023-08-04 RX ADMIN — COLCHICINE 0.6 MG: 0.6 TABLET, FILM COATED ORAL at 08:56

## 2023-08-04 RX ADMIN — SODIUM CHLORIDE TAB 1 GM 1 G: 1 TAB at 08:56

## 2023-08-04 RX ADMIN — OXYCODONE HYDROCHLORIDE AND ACETAMINOPHEN 2 TABLET: 5; 325 TABLET ORAL at 12:57

## 2023-08-04 RX ADMIN — APIXABAN 5 MG: 5 TABLET, FILM COATED ORAL at 12:57

## 2023-08-04 RX ADMIN — POTASSIUM CHLORIDE 20 MEQ: 1500 TABLET, EXTENDED RELEASE ORAL at 16:54

## 2023-08-04 RX ADMIN — NALOXEGOL OXALATE 12.5 MG: 12.5 TABLET, FILM COATED ORAL at 12:58

## 2023-08-04 RX ADMIN — AMIODARONE HYDROCHLORIDE 200 MG: 200 TABLET ORAL at 21:34

## 2023-08-04 RX ADMIN — Medication 5 MG: at 21:32

## 2023-08-04 RX ADMIN — CLOPIDOGREL BISULFATE 75 MG: 75 TABLET ORAL at 08:56

## 2023-08-04 RX ADMIN — INSULIN LISPRO 1 UNITS: 100 INJECTION, SOLUTION INTRAVENOUS; SUBCUTANEOUS at 16:54

## 2023-08-04 RX ADMIN — METOPROLOL TARTRATE 12.5 MG: 25 TABLET, FILM COATED ORAL at 21:32

## 2023-08-04 RX ADMIN — LORAZEPAM 0.5 MG: 0.5 TABLET ORAL at 22:16

## 2023-08-04 RX ADMIN — SODIUM CHLORIDE TAB 1 GM 1 G: 1 TAB at 21:32

## 2023-08-04 RX ADMIN — FUROSEMIDE 20 MG: 20 TABLET ORAL at 12:49

## 2023-08-04 RX ADMIN — TAMSULOSIN HYDROCHLORIDE 0.4 MG: 0.4 CAPSULE ORAL at 08:56

## 2023-08-04 RX ADMIN — ALLOPURINOL 300 MG: 300 TABLET ORAL at 08:57

## 2023-08-04 RX ADMIN — POLYETHYLENE GLYCOL 3350 17 G: 17 POWDER, FOR SOLUTION ORAL at 08:55

## 2023-08-04 RX ADMIN — INSULIN LISPRO 1 UNITS: 100 INJECTION, SOLUTION INTRAVENOUS; SUBCUTANEOUS at 12:49

## 2023-08-04 RX ADMIN — OXYCODONE HYDROCHLORIDE AND ACETAMINOPHEN 2 TABLET: 5; 325 TABLET ORAL at 08:57

## 2023-08-04 RX ADMIN — ASPIRIN 81 MG 81 MG: 81 TABLET ORAL at 08:57

## 2023-08-04 RX ADMIN — SODIUM CHLORIDE, PRESERVATIVE FREE 10 ML: 5 INJECTION INTRAVENOUS at 22:16

## 2023-08-04 RX ADMIN — SENNOSIDES AND DOCUSATE SODIUM 1 TABLET: 50; 8.6 TABLET ORAL at 21:32

## 2023-08-04 RX ADMIN — AMLODIPINE BESYLATE 5 MG: 5 TABLET ORAL at 08:57

## 2023-08-04 RX ADMIN — AMIODARONE HYDROCHLORIDE 200 MG: 200 TABLET ORAL at 08:57

## 2023-08-04 RX ADMIN — APIXABAN 5 MG: 5 TABLET, FILM COATED ORAL at 21:32

## 2023-08-04 RX ADMIN — SODIUM CHLORIDE, PRESERVATIVE FREE 10 ML: 5 INJECTION INTRAVENOUS at 08:58

## 2023-08-04 RX ADMIN — ATORVASTATIN CALCIUM 20 MG: 40 TABLET, FILM COATED ORAL at 21:33

## 2023-08-04 RX ADMIN — SENNOSIDES AND DOCUSATE SODIUM 1 TABLET: 50; 8.6 TABLET ORAL at 08:56

## 2023-08-04 RX ADMIN — FUROSEMIDE 20 MG: 20 TABLET ORAL at 16:54

## 2023-08-04 RX ADMIN — OXYCODONE HYDROCHLORIDE AND ACETAMINOPHEN 2 TABLET: 5; 325 TABLET ORAL at 18:36

## 2023-08-04 RX ADMIN — INSULIN LISPRO 1 UNITS: 100 INJECTION, SOLUTION INTRAVENOUS; SUBCUTANEOUS at 21:33

## 2023-08-04 ASSESSMENT — PAIN SCALES - GENERAL
PAINLEVEL_OUTOF10: 7
PAINLEVEL_OUTOF10: 4
PAINLEVEL_OUTOF10: 7
PAINLEVEL_OUTOF10: 7

## 2023-08-04 ASSESSMENT — PAIN DESCRIPTION - LOCATION
LOCATION: INCISION
LOCATION: CHEST
LOCATION: INCISION

## 2023-08-04 ASSESSMENT — PAIN DESCRIPTION - DESCRIPTORS
DESCRIPTORS: ACHING

## 2023-08-04 ASSESSMENT — PAIN DESCRIPTION - ORIENTATION
ORIENTATION: LEFT;MID
ORIENTATION: LEFT
ORIENTATION: LEFT;MID

## 2023-08-04 ASSESSMENT — PAIN - FUNCTIONAL ASSESSMENT
PAIN_FUNCTIONAL_ASSESSMENT: ACTIVITIES ARE NOT PREVENTED

## 2023-08-04 ASSESSMENT — PAIN DESCRIPTION - PAIN TYPE: TYPE: SURGICAL PAIN

## 2023-08-04 NOTE — CARE COORDINATION
Transition planning. POD 9 CABGx3    PPM implant yesterday. [address] [place], admissions manager with [facility] requesting follow up on referral sent. Submitted auth, P2P upheld, She expects to hear back on Monday from ins.     [address] submitted an expedited appeal today and was informed they take at least 72 hours.

## 2023-08-04 NOTE — DISCHARGE INSTRUCTIONS
ACTIVITY/EXERCISE   ? It will take 2 to 3 months to feel like you did before surgery in terms of energy and strength. ?    Slowly increase your activity after you go home. Pace yourself, listen to your body. If it hurts, stop. During the first 2 months, no digging, outside bike riding, or using arm movement on  a stationary bike for sternal precautions. ?   The limit on how much you can lift, push or pull is 10 pounds. For the first 4 weeks after surgery, you must not lift anything over 10 pounds. (You cannot lift anything heavier than a gallon of  milk). Beginning the 5th week after surgery, you may lift, push or pull up to 20 pounds. ? Begin your walking program on the first day you are home and record how many times per day and number of minutes on your log. You may climb stairs; there are no limits to stair climbing. ? Continue to do your cough and deep breathing exercises and the incentive spirometer 6 times a day as you did in the hospital.   ?    Do not use arms to get up from a seated position. Instead, rock in your chair to give yourself momentum to sit up.   ?    You may begin light house hold chores, washing a few dishes, dusting, setting the table or folding laundry. ?    You can have sex when it is comfortable for you. The amount of stress on the heart during sex is about the same as climbing 2 flights of stairs. APPETITE   ? Your appetite might be decreased at first.  It should slowly improve. ? Small, frequent meals, as well as cold foods, may help. ?   The dietitian will assist you with a low fat, low cholesterol, low salt diet. ? Consult your open heart binder for diet instructions. TEMPERATURE   ? Take your temperature at the same time each day. Take your temperature at 8 a.m. (morning) and 8 p.m. (evening). WEIGHT   ? Weigh yourself when you awaken in the morning and record in your daily log. PAIN   ? You may have pain at the incision. no

## 2023-08-05 ENCOUNTER — APPOINTMENT (OUTPATIENT)
Dept: GENERAL RADIOLOGY | Age: 83
DRG: 235 | End: 2023-08-05
Attending: INTERNAL MEDICINE
Payer: MEDICARE

## 2023-08-05 LAB
CA-I BLD-SCNC: 1.17 MMOL/L (ref 1.13–1.33)
EKG ATRIAL RATE: 84 BPM
EKG P AXIS: 21 DEGREES
EKG P-R INTERVAL: 190 MS
EKG Q-T INTERVAL: 436 MS
EKG QRS DURATION: 160 MS
EKG QTC CALCULATION (BAZETT): 515 MS
EKG R AXIS: -60 DEGREES
EKG T AXIS: 49 DEGREES
EKG VENTRICULAR RATE: 84 BPM
ERYTHROCYTE [DISTWIDTH] IN BLOOD BY AUTOMATED COUNT: 14.5 % (ref 11.8–14.4)
HCT VFR BLD AUTO: 26.2 % (ref 40.7–50.3)
HGB BLD-MCNC: 8.7 G/DL (ref 13–17)
MAGNESIUM SERPL-MCNC: 1.8 MG/DL (ref 1.6–2.6)
MCH RBC QN AUTO: 31.5 PG (ref 25.2–33.5)
MCHC RBC AUTO-ENTMCNC: 33.2 G/DL (ref 28.4–34.8)
MCV RBC AUTO: 94.9 FL (ref 82.6–102.9)
METER GLUCOSE: 170 MG/DL (ref 75–110)
METER GLUCOSE: 174 MG/DL (ref 75–110)
METER GLUCOSE: 175 MG/DL (ref 75–110)
METER GLUCOSE: 202 MG/DL (ref 75–110)
NRBC BLD-RTO: 0 PER 100 WBC
PLATELET # BLD AUTO: 348 K/UL (ref 138–453)
PMV BLD AUTO: 9 FL (ref 8.1–13.5)
RBC # BLD AUTO: 2.76 M/UL (ref 4.21–5.77)
WBC OTHER # BLD: 7.7 K/UL (ref 3.5–11.3)

## 2023-08-05 PROCEDURE — 2140000001 HC CVICU INTERMEDIATE R&B

## 2023-08-05 PROCEDURE — 85027 COMPLETE CBC AUTOMATED: CPT

## 2023-08-05 PROCEDURE — 82330 ASSAY OF CALCIUM: CPT

## 2023-08-05 PROCEDURE — 93005 ELECTROCARDIOGRAM TRACING: CPT | Performed by: THORACIC SURGERY (CARDIOTHORACIC VASCULAR SURGERY)

## 2023-08-05 PROCEDURE — 93010 ELECTROCARDIOGRAM REPORT: CPT | Performed by: INTERNAL MEDICINE

## 2023-08-05 PROCEDURE — 83735 ASSAY OF MAGNESIUM: CPT

## 2023-08-05 PROCEDURE — 97110 THERAPEUTIC EXERCISES: CPT

## 2023-08-05 PROCEDURE — 2580000003 HC RX 258: Performed by: PHYSICIAN ASSISTANT

## 2023-08-05 PROCEDURE — 6370000000 HC RX 637 (ALT 250 FOR IP): Performed by: NURSE PRACTITIONER

## 2023-08-05 PROCEDURE — 97116 GAIT TRAINING THERAPY: CPT

## 2023-08-05 PROCEDURE — 94761 N-INVAS EAR/PLS OXIMETRY MLT: CPT

## 2023-08-05 PROCEDURE — 6370000000 HC RX 637 (ALT 250 FOR IP): Performed by: THORACIC SURGERY (CARDIOTHORACIC VASCULAR SURGERY)

## 2023-08-05 PROCEDURE — 51798 US URINE CAPACITY MEASURE: CPT

## 2023-08-05 PROCEDURE — 36415 COLL VENOUS BLD VENIPUNCTURE: CPT

## 2023-08-05 PROCEDURE — 6370000000 HC RX 637 (ALT 250 FOR IP): Performed by: PHYSICIAN ASSISTANT

## 2023-08-05 PROCEDURE — 71045 X-RAY EXAM CHEST 1 VIEW: CPT

## 2023-08-05 PROCEDURE — 6360000002 HC RX W HCPCS: Performed by: PHYSICIAN ASSISTANT

## 2023-08-05 PROCEDURE — 6370000000 HC RX 637 (ALT 250 FOR IP): Performed by: STUDENT IN AN ORGANIZED HEALTH CARE EDUCATION/TRAINING PROGRAM

## 2023-08-05 PROCEDURE — 82947 ASSAY GLUCOSE BLOOD QUANT: CPT

## 2023-08-05 RX ORDER — LORAZEPAM 0.5 MG/1
0.5 TABLET ORAL EVERY 8 HOURS PRN
Status: DISCONTINUED | OUTPATIENT
Start: 2023-08-05 | End: 2023-08-06 | Stop reason: HOSPADM

## 2023-08-05 RX ADMIN — INSULIN GLARGINE 10 UNITS: 100 INJECTION, SOLUTION SUBCUTANEOUS at 20:22

## 2023-08-05 RX ADMIN — POTASSIUM CHLORIDE 20 MEQ: 1500 TABLET, EXTENDED RELEASE ORAL at 08:15

## 2023-08-05 RX ADMIN — FUROSEMIDE 20 MG: 20 TABLET ORAL at 08:14

## 2023-08-05 RX ADMIN — SODIUM CHLORIDE TAB 1 GM 1 G: 1 TAB at 08:16

## 2023-08-05 RX ADMIN — SODIUM CHLORIDE, PRESERVATIVE FREE 10 ML: 5 INJECTION INTRAVENOUS at 08:15

## 2023-08-05 RX ADMIN — ATORVASTATIN CALCIUM 20 MG: 40 TABLET, FILM COATED ORAL at 20:17

## 2023-08-05 RX ADMIN — Medication 5 MG: at 22:33

## 2023-08-05 RX ADMIN — SODIUM CHLORIDE, PRESERVATIVE FREE 10 ML: 5 INJECTION INTRAVENOUS at 20:21

## 2023-08-05 RX ADMIN — NALOXEGOL OXALATE 12.5 MG: 12.5 TABLET, FILM COATED ORAL at 10:32

## 2023-08-05 RX ADMIN — OXYCODONE HYDROCHLORIDE AND ACETAMINOPHEN 2 TABLET: 5; 325 TABLET ORAL at 00:10

## 2023-08-05 RX ADMIN — OXYCODONE HYDROCHLORIDE AND ACETAMINOPHEN 2 TABLET: 5; 325 TABLET ORAL at 04:34

## 2023-08-05 RX ADMIN — OXYCODONE HYDROCHLORIDE AND ACETAMINOPHEN 2 TABLET: 5; 325 TABLET ORAL at 14:30

## 2023-08-05 RX ADMIN — APIXABAN 5 MG: 5 TABLET, FILM COATED ORAL at 08:14

## 2023-08-05 RX ADMIN — AMIODARONE HYDROCHLORIDE 200 MG: 200 TABLET ORAL at 20:17

## 2023-08-05 RX ADMIN — SODIUM CHLORIDE TAB 1 GM 1 G: 1 TAB at 20:21

## 2023-08-05 RX ADMIN — AMLODIPINE BESYLATE 5 MG: 5 TABLET ORAL at 08:14

## 2023-08-05 RX ADMIN — INSULIN LISPRO 1 UNITS: 100 INJECTION, SOLUTION INTRAVENOUS; SUBCUTANEOUS at 20:23

## 2023-08-05 RX ADMIN — INSULIN LISPRO 2 UNITS: 100 INJECTION, SOLUTION INTRAVENOUS; SUBCUTANEOUS at 11:59

## 2023-08-05 RX ADMIN — OXYCODONE HYDROCHLORIDE AND ACETAMINOPHEN 2 TABLET: 5; 325 TABLET ORAL at 10:34

## 2023-08-05 RX ADMIN — COLCHICINE 0.6 MG: 0.6 TABLET, FILM COATED ORAL at 08:14

## 2023-08-05 RX ADMIN — AMIODARONE HYDROCHLORIDE 200 MG: 200 TABLET ORAL at 08:14

## 2023-08-05 RX ADMIN — MAGNESIUM SULFATE HEPTAHYDRATE 2000 MG: 40 INJECTION, SOLUTION INTRAVENOUS at 22:51

## 2023-08-05 RX ADMIN — APIXABAN 5 MG: 5 TABLET, FILM COATED ORAL at 20:17

## 2023-08-05 RX ADMIN — ASPIRIN 81 MG 81 MG: 81 TABLET ORAL at 08:15

## 2023-08-05 RX ADMIN — TAMSULOSIN HYDROCHLORIDE 0.4 MG: 0.4 CAPSULE ORAL at 08:13

## 2023-08-05 RX ADMIN — SENNOSIDES AND DOCUSATE SODIUM 1 TABLET: 50; 8.6 TABLET ORAL at 20:22

## 2023-08-05 RX ADMIN — FUROSEMIDE 20 MG: 20 TABLET ORAL at 17:16

## 2023-08-05 RX ADMIN — OXYCODONE HYDROCHLORIDE AND ACETAMINOPHEN 2 TABLET: 5; 325 TABLET ORAL at 18:25

## 2023-08-05 RX ADMIN — ALLOPURINOL 300 MG: 300 TABLET ORAL at 08:14

## 2023-08-05 RX ADMIN — POLYETHYLENE GLYCOL 3350 17 G: 17 POWDER, FOR SOLUTION ORAL at 08:16

## 2023-08-05 RX ADMIN — METOPROLOL TARTRATE 12.5 MG: 25 TABLET, FILM COATED ORAL at 20:19

## 2023-08-05 RX ADMIN — OXYCODONE HYDROCHLORIDE AND ACETAMINOPHEN 2 TABLET: 5; 325 TABLET ORAL at 23:07

## 2023-08-05 RX ADMIN — LORAZEPAM 0.5 MG: 0.5 TABLET ORAL at 22:42

## 2023-08-05 RX ADMIN — SENNOSIDES AND DOCUSATE SODIUM 1 TABLET: 50; 8.6 TABLET ORAL at 08:13

## 2023-08-05 ASSESSMENT — PAIN SCALES - GENERAL
PAINLEVEL_OUTOF10: 8
PAINLEVEL_OUTOF10: 9
PAINLEVEL_OUTOF10: 7
PAINLEVEL_OUTOF10: 7

## 2023-08-05 ASSESSMENT — PAIN DESCRIPTION - ORIENTATION: ORIENTATION: LEFT

## 2023-08-05 ASSESSMENT — PAIN - FUNCTIONAL ASSESSMENT: PAIN_FUNCTIONAL_ASSESSMENT: ACTIVITIES ARE NOT PREVENTED

## 2023-08-05 ASSESSMENT — PAIN DESCRIPTION - LOCATION: LOCATION: NECK;GENERALIZED

## 2023-08-05 ASSESSMENT — PAIN DESCRIPTION - DESCRIPTORS: DESCRIPTORS: DISCOMFORT;ACHING

## 2023-08-06 ENCOUNTER — APPOINTMENT (OUTPATIENT)
Dept: GENERAL RADIOLOGY | Age: 83
DRG: 235 | End: 2023-08-06
Attending: INTERNAL MEDICINE
Payer: MEDICARE

## 2023-08-06 VITALS
WEIGHT: 188 LBS | OXYGEN SATURATION: 97 % | BODY MASS INDEX: 27.85 KG/M2 | RESPIRATION RATE: 20 BRPM | HEART RATE: 92 BPM | HEIGHT: 69 IN | TEMPERATURE: 98 F | SYSTOLIC BLOOD PRESSURE: 124 MMHG | DIASTOLIC BLOOD PRESSURE: 76 MMHG

## 2023-08-06 LAB
ANION GAP SERPL CALCULATED.3IONS-SCNC: 13 MMOL/L (ref 9–17)
BUN SERPL-MCNC: 24 MG/DL (ref 8–23)
CA-I BLD-SCNC: 1.18 MMOL/L (ref 1.13–1.33)
CALCIUM SERPL-MCNC: 9.5 MG/DL (ref 8.6–10.4)
CHLORIDE SERPL-SCNC: 97 MMOL/L (ref 98–107)
CO2 SERPL-SCNC: 23 MMOL/L (ref 20–31)
CREAT SERPL-MCNC: 1.1 MG/DL (ref 0.7–1.2)
ERYTHROCYTE [DISTWIDTH] IN BLOOD BY AUTOMATED COUNT: 14.5 % (ref 11.8–14.4)
GFR SERPL CREATININE-BSD FRML MDRD: >60 ML/MIN/1.73M2
GLUCOSE SERPL-MCNC: 147 MG/DL (ref 70–99)
HCT VFR BLD AUTO: 28.8 % (ref 40.7–50.3)
HGB BLD-MCNC: 9.3 G/DL (ref 13–17)
MAGNESIUM SERPL-MCNC: 2.3 MG/DL (ref 1.6–2.6)
MCH RBC QN AUTO: 31.1 PG (ref 25.2–33.5)
MCHC RBC AUTO-ENTMCNC: 32.3 G/DL (ref 28.4–34.8)
MCV RBC AUTO: 96.3 FL (ref 82.6–102.9)
METER GLUCOSE: 134 MG/DL (ref 75–110)
METER GLUCOSE: 199 MG/DL (ref 75–110)
NRBC BLD-RTO: 0 PER 100 WBC
PLATELET # BLD AUTO: 422 K/UL (ref 138–453)
PMV BLD AUTO: 9.3 FL (ref 8.1–13.5)
POTASSIUM SERPL-SCNC: 4.8 MMOL/L (ref 3.7–5.3)
RBC # BLD AUTO: 2.99 M/UL (ref 4.21–5.77)
SODIUM SERPL-SCNC: 133 MMOL/L (ref 135–144)
WBC OTHER # BLD: 8.4 K/UL (ref 3.5–11.3)

## 2023-08-06 PROCEDURE — 6370000000 HC RX 637 (ALT 250 FOR IP): Performed by: NURSE PRACTITIONER

## 2023-08-06 PROCEDURE — 71045 X-RAY EXAM CHEST 1 VIEW: CPT

## 2023-08-06 PROCEDURE — 82947 ASSAY GLUCOSE BLOOD QUANT: CPT

## 2023-08-06 PROCEDURE — 36415 COLL VENOUS BLD VENIPUNCTURE: CPT

## 2023-08-06 PROCEDURE — 82330 ASSAY OF CALCIUM: CPT

## 2023-08-06 PROCEDURE — 85027 COMPLETE CBC AUTOMATED: CPT

## 2023-08-06 PROCEDURE — 2580000003 HC RX 258: Performed by: PHYSICIAN ASSISTANT

## 2023-08-06 PROCEDURE — 6370000000 HC RX 637 (ALT 250 FOR IP): Performed by: PHYSICIAN ASSISTANT

## 2023-08-06 PROCEDURE — 80048 BASIC METABOLIC PNL TOTAL CA: CPT

## 2023-08-06 PROCEDURE — 6370000000 HC RX 637 (ALT 250 FOR IP): Performed by: STUDENT IN AN ORGANIZED HEALTH CARE EDUCATION/TRAINING PROGRAM

## 2023-08-06 PROCEDURE — 6370000000 HC RX 637 (ALT 250 FOR IP): Performed by: THORACIC SURGERY (CARDIOTHORACIC VASCULAR SURGERY)

## 2023-08-06 PROCEDURE — 83735 ASSAY OF MAGNESIUM: CPT

## 2023-08-06 RX ORDER — COLCHICINE 0.6 MG/1
0.6 TABLET ORAL DAILY
Qty: 30 TABLET | Refills: 3 | Status: SHIPPED | OUTPATIENT
Start: 2023-08-07

## 2023-08-06 RX ORDER — ATORVASTATIN CALCIUM 20 MG/1
20 TABLET, FILM COATED ORAL NIGHTLY
Qty: 30 TABLET | Refills: 3 | Status: SHIPPED | OUTPATIENT
Start: 2023-08-06

## 2023-08-06 RX ORDER — BISACODYL 10 MG
10 SUPPOSITORY, RECTAL RECTAL DAILY PRN
Qty: 15 SUPPOSITORY | Refills: 0 | Status: SHIPPED | OUTPATIENT
Start: 2023-08-06 | End: 2023-09-05

## 2023-08-06 RX ORDER — FUROSEMIDE 20 MG/1
20 TABLET ORAL DAILY
Qty: 60 TABLET | Refills: 3 | Status: SHIPPED | OUTPATIENT
Start: 2023-08-06

## 2023-08-06 RX ORDER — TAMSULOSIN HYDROCHLORIDE 0.4 MG/1
0.4 CAPSULE ORAL DAILY
Qty: 30 CAPSULE | Refills: 3 | Status: SHIPPED | OUTPATIENT
Start: 2023-08-07

## 2023-08-06 RX ORDER — ONDANSETRON 4 MG/1
4 TABLET, ORALLY DISINTEGRATING ORAL EVERY 8 HOURS PRN
Qty: 30 TABLET | Refills: 0 | Status: SHIPPED | OUTPATIENT
Start: 2023-08-06

## 2023-08-06 RX ORDER — POTASSIUM CHLORIDE 20 MEQ/1
20 TABLET, EXTENDED RELEASE ORAL DAILY
Qty: 60 TABLET | Refills: 3 | Status: SHIPPED | OUTPATIENT
Start: 2023-08-07

## 2023-08-06 RX ORDER — CHOLECALCIFEROL (VITAMIN D3) 125 MCG
5 CAPSULE ORAL NIGHTLY
Qty: 30 TABLET | Refills: 0 | Status: SHIPPED | OUTPATIENT
Start: 2023-08-06

## 2023-08-06 RX ORDER — AMIODARONE HYDROCHLORIDE 200 MG/1
200 TABLET ORAL 2 TIMES DAILY
Qty: 30 TABLET | Refills: 0 | Status: SHIPPED | OUTPATIENT
Start: 2023-08-06

## 2023-08-06 RX ORDER — AMLODIPINE BESYLATE 5 MG/1
5 TABLET ORAL DAILY
Qty: 30 TABLET | Refills: 3 | Status: SHIPPED | OUTPATIENT
Start: 2023-08-07

## 2023-08-06 RX ORDER — LIDOCAINE 4 G/G
1 PATCH TOPICAL DAILY PRN
Qty: 30 EACH | Refills: 0 | Status: SHIPPED | OUTPATIENT
Start: 2023-08-06

## 2023-08-06 RX ORDER — LORAZEPAM 0.5 MG/1
0.5 TABLET ORAL EVERY 8 HOURS PRN
Qty: 15 TABLET | Refills: 0 | Status: SHIPPED | OUTPATIENT
Start: 2023-08-06 | End: 2023-09-05

## 2023-08-06 RX ADMIN — INSULIN LISPRO 1 UNITS: 100 INJECTION, SOLUTION INTRAVENOUS; SUBCUTANEOUS at 11:26

## 2023-08-06 RX ADMIN — AMLODIPINE BESYLATE 5 MG: 5 TABLET ORAL at 08:29

## 2023-08-06 RX ADMIN — OXYCODONE HYDROCHLORIDE AND ACETAMINOPHEN 2 TABLET: 5; 325 TABLET ORAL at 11:26

## 2023-08-06 RX ADMIN — SENNOSIDES AND DOCUSATE SODIUM 1 TABLET: 50; 8.6 TABLET ORAL at 08:32

## 2023-08-06 RX ADMIN — APIXABAN 5 MG: 5 TABLET, FILM COATED ORAL at 08:29

## 2023-08-06 RX ADMIN — NALOXEGOL OXALATE 12.5 MG: 12.5 TABLET, FILM COATED ORAL at 11:26

## 2023-08-06 RX ADMIN — AMIODARONE HYDROCHLORIDE 200 MG: 200 TABLET ORAL at 08:29

## 2023-08-06 RX ADMIN — SODIUM CHLORIDE, PRESERVATIVE FREE 10 ML: 5 INJECTION INTRAVENOUS at 08:31

## 2023-08-06 RX ADMIN — ASPIRIN 81 MG 81 MG: 81 TABLET ORAL at 08:29

## 2023-08-06 RX ADMIN — POTASSIUM CHLORIDE 20 MEQ: 1500 TABLET, EXTENDED RELEASE ORAL at 08:29

## 2023-08-06 RX ADMIN — ALLOPURINOL 300 MG: 300 TABLET ORAL at 08:29

## 2023-08-06 RX ADMIN — DIPHENHYDRAMINE HYDROCHLORIDE 25 MG: 25 TABLET ORAL at 01:04

## 2023-08-06 RX ADMIN — TAMSULOSIN HYDROCHLORIDE 0.4 MG: 0.4 CAPSULE ORAL at 08:31

## 2023-08-06 RX ADMIN — FUROSEMIDE 20 MG: 20 TABLET ORAL at 08:29

## 2023-08-06 RX ADMIN — SODIUM CHLORIDE TAB 1 GM 1 G: 1 TAB at 08:31

## 2023-08-06 RX ADMIN — COLCHICINE 0.6 MG: 0.6 TABLET, FILM COATED ORAL at 08:29

## 2023-08-06 ASSESSMENT — PAIN SCALES - GENERAL: PAINLEVEL_OUTOF10: 7

## 2023-08-06 NOTE — CARE COORDINATION
In [employer] portal, appeal is listed as overturned. Voicemail left for Sheba Veliz and Sarah Melchor from [facility] requesting return call    [phone] received call from Charlotte Rosen from [facility]. They are able to accept patient today. Patient updated. Discussed transportation. He will make arrangements with his family    200 patient relates that his family will transport him around 3-3:30 PM. Charlotte Rosen updated and agreeable.  AVS faxed    Discharge Report    [facility])  Clinical Case Management Department  Written by: Kevin Luna RN    Patient Name: Shell Gaines  Attending Provider: Nini Stroud MD  Admit Date: 2023  9:47 AM  MRN: [id number]  Account: [de-identified]                     : 1940  Discharge Date: 2023      Disposition: Magy Soto RN

## 2023-08-06 NOTE — PLAN OF CARE
At this time there is no change in patient's current plan of care. We are awaiting for facility to have a bed ready. We are still seeking evaluation for acute rehab at this time. All questions and concerns answered. Patient shows no acute distress.
Late Note entry:    IABP was removed this AM. Tolerated it well.
Patient received from OR orally intubated with ETT # 8.0 secured at the 25 cm justin at the lip.
Problem: Discharge Planning  Goal: Discharge to home or other facility with appropriate resources  7/27/2023 061  Outcome: Progressing       Problem: Safety - Adult  Goal: Free from fall injury  7/27/2023 0611  Outcome: Progressing       Problem: Chronic Conditions and Co-morbidities  Goal: Patient's chronic conditions and co-morbidity symptoms are monitored and maintained or improved  7/27/2023 0611   Outcome: Progressing    Problem: Respiratory - Adult  Goal: Achieves optimal ventilation and oxygenation  7/27/2023 0611  Outcome: Progressing       Problem: Pain  Goal: Verbalizes/displays adequate comfort level or baseline comfort level  Outcome: Progressing
Problem: Discharge Planning  Goal: Discharge to home or other facility with appropriate resources  7/27/2023 1838 by Tra Rosenberg RN  Outcome: Progressing  7/27/2023 0611 by Jayleen Barajas RN  Outcome: Progressing     Problem: Safety - Adult  Goal: Free from fall injury  7/27/2023 1838 by Tra Rosenberg RN  Outcome: Progressing  7/27/2023 0611 by Jayleen Barajas RN  Outcome: Progressing     Problem: Chronic Conditions and Co-morbidities  Goal: Patient's chronic conditions and co-morbidity symptoms are monitored and maintained or improved  7/27/2023 1838 by Tra Rosenberg RN  Outcome: Progressing  7/27/2023 0611 by Jayleen Barajas RN  Outcome: Progressing     Problem: Respiratory - Adult  Goal: Achieves optimal ventilation and oxygenation  7/27/2023 1838 by Tra Rosenberg RN  Outcome: Progressing  7/27/2023 0611 by Jayleen Barajas RN  Outcome: Progressing     Problem: Skin/Tissue Integrity  Goal: Absence of new skin breakdown  Description: 1. Monitor for areas of redness and/or skin breakdown  2. Assess vascular access sites hourly  3. Every 4-6 hours minimum:  Change oxygen saturation probe site  4. Every 4-6 hours:  If on nasal continuous positive airway pressure, respiratory therapy assess nares and determine need for appliance change or resting period.   7/27/2023 1838 by Tra Rosenberg RN  Outcome: Progressing  7/27/2023 0611 by Jayleen Barajas RN  Outcome: Progressing     Problem: Pain  Goal: Verbalizes/displays adequate comfort level or baseline comfort level  7/27/2023 1838 by Tra Rosenberg RN  Outcome: Progressing  7/27/2023 0611 by Jayleen Barajas RN  Outcome: Progressing
Problem: Discharge Planning  Goal: Discharge to home or other facility with appropriate resources  8/3/2023 1554 by Daniella Rasheed RN  Outcome: Progressing     Problem: Safety - Adult  Goal: Free from fall injury  8/3/2023 1554 by Daniella Rasheed RN  Outcome: Progressing     Problem: Chronic Conditions and Co-morbidities  Goal: Patient's chronic conditions and co-morbidity symptoms are monitored and maintained or improved  8/3/2023 1554 by Daniella Rasheed RN  Outcome: Progressing     Problem: Respiratory - Adult  Goal: Achieves optimal ventilation and oxygenation  8/3/2023 1554 by Daniella Rasheed RN  Outcome: Progressing     Problem: Skin/Tissue Integrity  Goal: Absence of new skin breakdown  Description: 1. Monitor for areas of redness and/or skin breakdown  2. Assess vascular access sites hourly  3. Every 4-6 hours minimum:  Change oxygen saturation probe site  4. Every 4-6 hours:  If on nasal continuous positive airway pressure, respiratory therapy assess nares and determine need for appliance change or resting period.   8/3/2023 1554 by Daniella Rasheed RN  Outcome: Progressing     Problem: Pain  Goal: Verbalizes/displays adequate comfort level or baseline comfort level  8/3/2023 1554 by Daniella Rasheed RN  Outcome: Progressing     Problem: ABCDS Injury Assessment  Goal: Absence of physical injury  8/3/2023 1554 by Daniella Rasheed RN  Outcome: Progressing     Problem: Nutrition Deficit:  Goal: Optimize nutritional status  8/3/2023 1554 by Daniella Rasheed RN  Outcome: Progressing
Problem: Discharge Planning  Goal: Discharge to home or other facility with appropriate resources  8/4/2023 0107 by Analia Garg RN  Outcome: Progressing  Flowsheets (Taken 8/3/2023 2000)  Discharge to home or other facility with appropriate resources:   Identify barriers to discharge with patient and caregiver   Arrange for needed discharge resources and transportation as appropriate   Identify discharge learning needs (meds, wound care, etc)  8/3/2023 1554 by Chelsea Powers RN  Outcome: Progressing  8/3/2023 1245 by Chelsea Powers RN  Outcome: Progressing     Problem: Safety - Adult  Goal: Free from fall injury  8/4/2023 0107 by Analia Garg RN  Outcome: Progressing  8/3/2023 1554 by Chelsea Powers RN  Outcome: Progressing  8/3/2023 1245 by Chelsea Powers RN  Outcome: Progressing     Problem: Chronic Conditions and Co-morbidities  Goal: Patient's chronic conditions and co-morbidity symptoms are monitored and maintained or improved  8/4/2023 0107 by Analia Garg RN  Outcome: Progressing  Flowsheets (Taken 8/3/2023 2000)  Care Plan - Patient's Chronic Conditions and Co-Morbidity Symptoms are Monitored and Maintained or Improved:   Monitor and assess patient's chronic conditions and comorbid symptoms for stability, deterioration, or improvement   Collaborate with multidisciplinary team to address chronic and comorbid conditions and prevent exacerbation or deterioration  8/3/2023 1554 by Chelsea Powers RN  Outcome: Progressing  8/3/2023 1245 by Chelsea Powers RN  Outcome: Progressing     Problem: Respiratory - Adult  Goal: Achieves optimal ventilation and oxygenation  8/4/2023 0107 by nAalia Garg RN  Outcome: Progressing  8/3/2023 1554 by Chelsea Powers RN  Outcome: Progressing  8/3/2023 1245 by Chelsea Powers RN  Outcome: Progressing     Problem: Skin/Tissue Integrity  Goal: Absence of new skin breakdown  Description: 1.   Monitor for areas of redness and/or skin
Problem: Discharge Planning  Goal: Discharge to home or other facility with appropriate resources  8/5/2023 0431 by Tona Gold RN  Outcome: Progressing  8/4/2023 1956 by Emerald Baig RN  Outcome: Progressing     Problem: Safety - Adult  Goal: Free from fall injury  8/5/2023 0431 by Tona Gold RN  Outcome: Progressing  8/4/2023 1956 by Emerald Baig RN  Outcome: Progressing     Problem: Chronic Conditions and Co-morbidities  Goal: Patient's chronic conditions and co-morbidity symptoms are monitored and maintained or improved  8/5/2023 0431 by Tona Gold RN  Outcome: Progressing  8/4/2023 1956 by Emerald Baig RN  Outcome: Progressing     Problem: Respiratory - Adult  Goal: Achieves optimal ventilation and oxygenation  8/5/2023 0431 by Tona Gold RN  Outcome: Progressing  8/4/2023 1956 by Emerald Baig RN  Outcome: Progressing     Problem: Skin/Tissue Integrity  Goal: Absence of new skin breakdown  Description: 1. Monitor for areas of redness and/or skin breakdown  2. Assess vascular access sites hourly  3. Every 4-6 hours minimum:  Change oxygen saturation probe site  4. Every 4-6 hours:  If on nasal continuous positive airway pressure, respiratory therapy assess nares and determine need for appliance change or resting period.   8/4/2023 1956 by Emerald Baig RN  Outcome: Progressing     Problem: Pain  Goal: Verbalizes/displays adequate comfort level or baseline comfort level  8/4/2023 1956 by Emerald Baig RN  Outcome: Progressing     Problem: ABCDS Injury Assessment  Goal: Absence of physical injury  8/4/2023 1956 by Emerald Baig RN  Outcome: Progressing     Problem: Nutrition Deficit:  Goal: Optimize nutritional status  8/4/2023 1956 by Emerald Baig RN  Outcome: Progressing
Problem: Discharge Planning  Goal: Discharge to home or other facility with appropriate resources  8/5/2023 1406 by Freddy Gurrola RN  Outcome: Progressing  8/5/2023 0431 by Zachary Castro RN  Outcome: Progressing     Problem: Safety - Adult  Goal: Free from fall injury  8/5/2023 1406 by Freddy Gurrola RN  Outcome: Progressing  8/5/2023 0431 by Zachary Castro RN  Outcome: Progressing     Problem: Chronic Conditions and Co-morbidities  Goal: Patient's chronic conditions and co-morbidity symptoms are monitored and maintained or improved  8/5/2023 1406 by Freddy Gurrola RN  Outcome: Progressing  8/5/2023 0431 by Zachary Castro RN  Outcome: Progressing     Problem: Respiratory - Adult  Goal: Achieves optimal ventilation and oxygenation  8/5/2023 1406 by Freddy Gurrola RN  Outcome: Progressing  8/5/2023 0431 by Zachary Castro RN  Outcome: Progressing     Problem: Skin/Tissue Integrity  Goal: Absence of new skin breakdown  Description: 1. Monitor for areas of redness and/or skin breakdown  2. Assess vascular access sites hourly  3. Every 4-6 hours minimum:  Change oxygen saturation probe site  4. Every 4-6 hours:  If on nasal continuous positive airway pressure, respiratory therapy assess nares and determine need for appliance change or resting period.   Outcome: Progressing     Problem: Pain  Goal: Verbalizes/displays adequate comfort level or baseline comfort level  Outcome: Progressing     Problem: ABCDS Injury Assessment  Goal: Absence of physical injury  Outcome: Progressing     Problem: Nutrition Deficit:  Goal: Optimize nutritional status  Outcome: Progressing
Problem: Discharge Planning  Goal: Discharge to home or other facility with appropriate resources  8/5/2023 2151 by Luc Bills RN  Outcome: Progressing  8/5/2023 1406 by Brayan Rossi RN  Outcome: Progressing     Problem: Safety - Adult  Goal: Free from fall injury  8/5/2023 2151 by Luc Bills RN  Outcome: Progressing  8/5/2023 1406 by Brayan Rossi RN  Outcome: Progressing     Problem: Chronic Conditions and Co-morbidities  Goal: Patient's chronic conditions and co-morbidity symptoms are monitored and maintained or improved  8/5/2023 2151 by Luc Bills RN  Outcome: Progressing  8/5/2023 1406 by Brayan Rossi RN  Outcome: Progressing     Problem: Respiratory - Adult  Goal: Achieves optimal ventilation and oxygenation  8/5/2023 2151 by Luc Bills RN  Outcome: Progressing  8/5/2023 1406 by Brayan Rossi RN  Outcome: Progressing     Problem: Skin/Tissue Integrity  Goal: Absence of new skin breakdown  Description: 1. Monitor for areas of redness and/or skin breakdown  2. Assess vascular access sites hourly  3. Every 4-6 hours minimum:  Change oxygen saturation probe site  4. Every 4-6 hours:  If on nasal continuous positive airway pressure, respiratory therapy assess nares and determine need for appliance change or resting period.   8/5/2023 2151 by Luc Bills RN  Outcome: Progressing  8/5/2023 1406 by Brayan Rossi RN  Outcome: Progressing     Problem: Pain  Goal: Verbalizes/displays adequate comfort level or baseline comfort level  8/5/2023 2151 by Luc Bills RN  Outcome: Progressing  8/5/2023 1406 by Brayan Rossi RN  Outcome: Progressing     Problem: ABCDS Injury Assessment  Goal: Absence of physical injury  8/5/2023 2151 by Luc Bills RN  Outcome: Progressing  8/5/2023 1406 by Brayan Rossi RN  Outcome: Progressing
Problem: Discharge Planning  Goal: Discharge to home or other facility with appropriate resources  Outcome: Progressing     Problem: Safety - Adult  Goal: Free from fall injury  Outcome: Progressing
Problem: Discharge Planning  Goal: Discharge to home or other facility with appropriate resources  Outcome: Progressing     Problem: Safety - Adult  Goal: Free from fall injury  Outcome: Progressing     Problem: Chronic Conditions and Co-morbidities  Goal: Patient's chronic conditions and co-morbidity symptoms are monitored and maintained or improved  Outcome: Progressing     Problem: Respiratory - Adult  Goal: Achieves optimal ventilation and oxygenation  7/26/2023 1839 by Elvis Macdonald RN  Outcome: Progressing  7/26/2023 1529 by Isaak Luu RCP  Outcome: Progressing     Problem: Skin/Tissue Integrity  Goal: Absence of new skin breakdown  Description: 1. Monitor for areas of redness and/or skin breakdown  2. Assess vascular access sites hourly  3. Every 4-6 hours minimum:  Change oxygen saturation probe site  4. Every 4-6 hours:  If on nasal continuous positive airway pressure, respiratory therapy assess nares and determine need for appliance change or resting period.   Outcome: Progressing
Problem: Discharge Planning  Goal: Discharge to home or other facility with appropriate resources  Outcome: Progressing     Problem: Safety - Adult  Goal: Free from fall injury  Outcome: Progressing     Problem: Chronic Conditions and Co-morbidities  Goal: Patient's chronic conditions and co-morbidity symptoms are monitored and maintained or improved  Outcome: Progressing     Problem: Respiratory - Adult  Goal: Achieves optimal ventilation and oxygenation  Outcome: Progressing     Problem: Skin/Tissue Integrity  Goal: Absence of new skin breakdown  Description: 1. Monitor for areas of redness and/or skin breakdown  2. Assess vascular access sites hourly  3. Every 4-6 hours minimum:  Change oxygen saturation probe site  4. Every 4-6 hours:  If on nasal continuous positive airway pressure, respiratory therapy assess nares and determine need for appliance change or resting period.   Outcome: Progressing     Problem: Pain  Goal: Verbalizes/displays adequate comfort level or baseline comfort level  Outcome: Progressing
Problem: Discharge Planning  Goal: Discharge to home or other facility with appropriate resources  Outcome: Progressing     Problem: Safety - Adult  Goal: Free from fall injury  Outcome: Progressing     Problem: Chronic Conditions and Co-morbidities  Goal: Patient's chronic conditions and co-morbidity symptoms are monitored and maintained or improved  Outcome: Progressing     Problem: Respiratory - Adult  Goal: Achieves optimal ventilation and oxygenation  Outcome: Progressing     Problem: Skin/Tissue Integrity  Goal: Absence of new skin breakdown  Description: 1. Monitor for areas of redness and/or skin breakdown  2. Assess vascular access sites hourly  3. Every 4-6 hours minimum:  Change oxygen saturation probe site  4. Every 4-6 hours:  If on nasal continuous positive airway pressure, respiratory therapy assess nares and determine need for appliance change or resting period.   Outcome: Progressing     Problem: Pain  Goal: Verbalizes/displays adequate comfort level or baseline comfort level  Outcome: Progressing     Problem: ABCDS Injury Assessment  Goal: Absence of physical injury  Outcome: Progressing     Problem: Nutrition Deficit:  Goal: Optimize nutritional status  Outcome: Progressing
Problem: Discharge Planning  Goal: Discharge to home or other facility with appropriate resources  Outcome: Progressing     Problem: Safety - Adult  Goal: Free from fall injury  Outcome: Progressing     Problem: Chronic Conditions and Co-morbidities  Goal: Patient's chronic conditions and co-morbidity symptoms are monitored and maintained or improved  Outcome: Progressing     Problem: Respiratory - Adult  Goal: Achieves optimal ventilation and oxygenation  Outcome: Progressing     Problem: Skin/Tissue Integrity  Goal: Absence of new skin breakdown  Description: 1. Monitor for areas of redness and/or skin breakdown  2. Assess vascular access sites hourly  3. Every 4-6 hours minimum:  Change oxygen saturation probe site  4. Every 4-6 hours:  If on nasal continuous positive airway pressure, respiratory therapy assess nares and determine need for appliance change or resting period.   Outcome: Progressing     Problem: Pain  Goal: Verbalizes/displays adequate comfort level or baseline comfort level  Outcome: Progressing     Problem: ABCDS Injury Assessment  Goal: Absence of physical injury  Outcome: Progressing     Problem: Nutrition Deficit:  Goal: Optimize nutritional status  Outcome: Progressing
Problem: Discharge Planning  Goal: Discharge to home or other facility with appropriate resources  Outcome: Progressing     Problem: Safety - Adult  Goal: Free from fall injury  Outcome: Progressing     Problem: Chronic Conditions and Co-morbidities  Goal: Patient's chronic conditions and co-morbidity symptoms are monitored and maintained or improved  Outcome: Progressing     Problem: Respiratory - Adult  Goal: Achieves optimal ventilation and oxygenation  Outcome: Progressing     Problem: Skin/Tissue Integrity  Goal: Absence of new skin breakdown  Description: 1. Monitor for areas of redness and/or skin breakdown  2. Assess vascular access sites hourly  3. Every 4-6 hours minimum:  Change oxygen saturation probe site  4. Every 4-6 hours:  If on nasal continuous positive airway pressure, respiratory therapy assess nares and determine need for appliance change or resting period.   Outcome: Progressing     Problem: Pain  Goal: Verbalizes/displays adequate comfort level or baseline comfort level  Outcome: Progressing  Flowsheets  Taken 8/3/2023 0400 by Beth Ambrose RN  Verbalizes/displays adequate comfort level or baseline comfort level:   Encourage patient to monitor pain and request assistance   Assess pain using appropriate pain scale  Taken 8/3/2023 0000 by Beth Ambrose RN  Verbalizes/displays adequate comfort level or baseline comfort level:   Encourage patient to monitor pain and request assistance   Assess pain using appropriate pain scale     Problem: ABCDS Injury Assessment  Goal: Absence of physical injury  Outcome: Progressing     Problem: Nutrition Deficit:  Goal: Optimize nutritional status  Outcome: Progressing
Problem: Discharge Planning  Goal: Discharge to home or other facility with appropriate resources  Outcome: Progressing     Problem: Safety - Adult  Goal: Free from fall injury  Outcome: Progressing  Flowsheets (Taken 8/1/2023 1015 by Nathalia Bowden RN)  Free From Fall Injury: Instruct family/caregiver on patient safety     Problem: Chronic Conditions and Co-morbidities  Goal: Patient's chronic conditions and co-morbidity symptoms are monitored and maintained or improved  Outcome: Progressing     Problem: Respiratory - Adult  Goal: Achieves optimal ventilation and oxygenation  Outcome: Progressing     Problem: Skin/Tissue Integrity  Goal: Absence of new skin breakdown  Description: 1. Monitor for areas of redness and/or skin breakdown  2. Assess vascular access sites hourly  3. Every 4-6 hours minimum:  Change oxygen saturation probe site  4. Every 4-6 hours:  If on nasal continuous positive airway pressure, respiratory therapy assess nares and determine need for appliance change or resting period.   Outcome: Progressing     Problem: Pain  Goal: Verbalizes/displays adequate comfort level or baseline comfort level  Outcome: Progressing     Problem: ABCDS Injury Assessment  Goal: Absence of physical injury  Outcome: Progressing  Flowsheets (Taken 8/1/2023 1015 by Nathalia Bowden, RN)  Absence of Physical Injury: Implement safety measures based on patient assessment     Problem: Nutrition Deficit:  Goal: Optimize nutritional status  Outcome: Progressing
Problem: Respiratory - Adult  Goal: Achieves optimal ventilation and oxygenation  Outcome: Progressing
Problem: Safety - Adult  Goal: Free from fall injury  7/29/2023 0239 by Kiki Singh RN  Outcome: Progressing    Problem: Skin/Tissue Integrity  Goal: Absence of new skin breakdown  Description: 1. Monitor for areas of redness and/or skin breakdown  2. Assess vascular access sites hourly  3. Every 4-6 hours minimum:  Change oxygen saturation probe site  4. Every 4-6 hours:  If on nasal continuous positive airway pressure, respiratory therapy assess nares and determine need for appliance change or resting period.   7/29/2023 0239 by Kiki Singh RN  Outcome: Progressing     Problem: Pain  Goal: Verbalizes/displays adequate comfort level or baseline comfort level
Problem: Safety - Adult  Goal: Free from fall injury  7/29/2023 1416 by Nicola Edwards RN  Outcome: Progressing  7/29/2023 0239 by Albert Tenorio RN  Outcome: Progressing     Problem: Respiratory - Adult  Goal: Achieves optimal ventilation and oxygenation  7/29/2023 1416 by Nciola Edwards RN  Outcome: Progressing  7/29/2023 0239 by Albert Tenorio RN  Outcome: Progressing     Problem: Skin/Tissue Integrity  Goal: Absence of new skin breakdown  Description: 1. Monitor for areas of redness and/or skin breakdown  2. Assess vascular access sites hourly  3. Every 4-6 hours minimum:  Change oxygen saturation probe site  4. Every 4-6 hours:  If on nasal continuous positive airway pressure, respiratory therapy assess nares and determine need for appliance change or resting period.   7/29/2023 1416 by Nicola Edwards RN  Outcome: Progressing  7/29/2023 0239 by Albert Tenorio RN  Outcome: Progressing
Problem: Safety - Adult  Goal: Free from fall injury  Recent Flowsheet Documentation  Taken 7/31/2023 1016 by Denisha Metcalf RN  Free From Fall Injury: Instruct family/caregiver on patient safety     Problem: ABCDS Injury Assessment  Goal: Absence of physical injury  Recent Flowsheet Documentation  Taken 7/31/2023 1016 by Denisha Metcalf RN  Absence of Physical Injury: Implement safety measures based on patient assessment
Assessment  Goal: Absence of physical injury  7/30/2023 0353 by Yamel Greenwoodr, RN  Outcome: Progressing  7/30/2023 0353 by Yamel العراقي, RN  Outcome: Progressing
If on nasal continuous positive airway pressure, respiratory therapy assess nares and determine need for appliance change or resting period.   7/30/2023 1605 by Mirtha Richardson RN  Outcome: Not Progressing  7/30/2023 0353 by Thomas Sanchez RN  Outcome: Progressing  7/30/2023 0353 by Thomas Sanchez RN  Outcome: Progressing
breakdown  2. Assess vascular access sites hourly  3. Every 4-6 hours minimum:  Change oxygen saturation probe site  4. Every 4-6 hours:  If on nasal continuous positive airway pressure, respiratory therapy assess nares and determine need for appliance change or resting period.   Outcome: Progressing     Problem: Pain  Goal: Verbalizes/displays adequate comfort level or baseline comfort level  Outcome: Progressing  Flowsheets (Taken 7/31/2023 2000)  Verbalizes/displays adequate comfort level or baseline comfort level:   Encourage patient to monitor pain and request assistance   Assess pain using appropriate pain scale   Administer analgesics based on type and severity of pain and evaluate response   Implement non-pharmacological measures as appropriate and evaluate response     Problem: ABCDS Injury Assessment  Goal: Absence of physical injury  Outcome: Progressing  Flowsheets (Taken 8/1/2023 0020)  Absence of Physical Injury: Implement safety measures based on patient assessment
skin breakdown  Description: 1. Monitor for areas of redness and/or skin breakdown  2. Assess vascular access sites hourly  3. Every 4-6 hours minimum:  Change oxygen saturation probe site  4. Every 4-6 hours:  If on nasal continuous positive airway pressure, respiratory therapy assess nares and determine need for appliance change or resting period.   7/30/2023 1937 by Shona Cancino RN  Outcome: Progressing  7/30/2023 1605 by Natan Acevedo RN  Outcome: Not Progressing

## 2023-08-07 LAB — ECHO BSA: 2.08 M2

## 2023-08-11 LAB
VAS LEFT GSV BK DIST DIAM: 2 MM
VAS LEFT GSV BK MID DIAM: 2 MM
VAS LEFT GSV BK PROX DIAM: 3 MM
VAS LEFT GSV THIGH DIST DIAM: 2 MM
VAS LEFT GSV THIGH MID DIAM: 3 MM
VAS LEFT GSV THIGH PROX DIAM: 5 MM
VAS RIGHT GSV BK DIST DIAM: 2 MM
VAS RIGHT GSV BK MID DIAM: 1 MM
VAS RIGHT GSV BK PROX DIAM: 1 MM
VAS RIGHT GSV THIGH DIST DIAM: 1 MM
VAS RIGHT GSV THIGH MID DIAM: 2 MM
VAS RIGHT GSV THIGH PROX DIAM: 2 MM

## 2023-08-15 ENCOUNTER — TELEMEDICINE (OUTPATIENT)
Dept: FAMILY MEDICINE CLINIC | Age: 83
End: 2023-08-15
Payer: MEDICARE

## 2023-08-15 DIAGNOSIS — F41.9 ANXIETY: ICD-10-CM

## 2023-08-15 DIAGNOSIS — F32.9 REACTIVE DEPRESSION: Primary | ICD-10-CM

## 2023-08-15 PROCEDURE — G8417 CALC BMI ABV UP PARAM F/U: HCPCS | Performed by: FAMILY MEDICINE

## 2023-08-15 PROCEDURE — 1036F TOBACCO NON-USER: CPT | Performed by: FAMILY MEDICINE

## 2023-08-15 PROCEDURE — 99214 OFFICE O/P EST MOD 30 MIN: CPT | Performed by: FAMILY MEDICINE

## 2023-08-15 PROCEDURE — G8428 CUR MEDS NOT DOCUMENT: HCPCS | Performed by: FAMILY MEDICINE

## 2023-08-15 PROCEDURE — 1111F DSCHRG MED/CURRENT MED MERGE: CPT | Performed by: FAMILY MEDICINE

## 2023-08-15 PROCEDURE — 1123F ACP DISCUSS/DSCN MKR DOCD: CPT | Performed by: FAMILY MEDICINE

## 2023-08-15 RX ORDER — LORAZEPAM 0.5 MG/1
0.5 TABLET ORAL EVERY 8 HOURS PRN
Qty: 60 TABLET | Refills: 0 | Status: ON HOLD | OUTPATIENT
Start: 2023-08-15 | End: 2023-08-19

## 2023-08-15 RX ORDER — PAROXETINE HYDROCHLORIDE 20 MG/1
20 TABLET, FILM COATED ORAL DAILY
Qty: 30 TABLET | Refills: 11 | Status: SHIPPED | OUTPATIENT
Start: 2023-08-15

## 2023-08-18 ENCOUNTER — APPOINTMENT (OUTPATIENT)
Dept: GENERAL RADIOLOGY | Age: 83
End: 2023-08-18
Payer: MEDICARE

## 2023-08-18 ENCOUNTER — APPOINTMENT (OUTPATIENT)
Dept: CT IMAGING | Age: 83
End: 2023-08-18
Payer: MEDICARE

## 2023-08-18 ENCOUNTER — HOSPITAL ENCOUNTER (OUTPATIENT)
Age: 83
Setting detail: OBSERVATION
Discharge: LEFT BEFORE DISCHARGE | End: 2023-08-19
Attending: EMERGENCY MEDICINE | Admitting: FAMILY MEDICINE
Payer: MEDICARE

## 2023-08-18 DIAGNOSIS — R42 DIZZINESS: ICD-10-CM

## 2023-08-18 DIAGNOSIS — Z95.1 S/P CABG (CORONARY ARTERY BYPASS GRAFT): Primary | ICD-10-CM

## 2023-08-18 PROBLEM — R53.1 GENERALIZED WEAKNESS: Status: ACTIVE | Noted: 2023-08-18

## 2023-08-18 PROBLEM — Z95.0 S/P PLACEMENT OF CARDIAC PACEMAKER: Status: ACTIVE | Noted: 2023-08-18

## 2023-08-18 LAB
ALBUMIN SERPL-MCNC: 3.8 G/DL (ref 3.5–5.2)
ALP SERPL-CCNC: 160 U/L (ref 40–129)
ALT SERPL-CCNC: 15 U/L (ref 5–41)
ANION GAP SERPL CALCULATED.3IONS-SCNC: 13 MMOL/L (ref 9–17)
AST SERPL-CCNC: 9 U/L
BASOPHILS # BLD: 0.04 K/UL (ref 0–0.2)
BASOPHILS NFR BLD: 1 % (ref 0–2)
BILIRUB SERPL-MCNC: 0.4 MG/DL (ref 0.3–1.2)
BNP SERPL-MCNC: 1005 PG/ML
BUN SERPL-MCNC: 45 MG/DL (ref 8–23)
BUN/CREAT SERPL: 28 (ref 9–20)
CALCIUM SERPL-MCNC: 8.9 MG/DL (ref 8.6–10.4)
CHLORIDE SERPL-SCNC: 97 MMOL/L (ref 98–107)
CO2 SERPL-SCNC: 22 MMOL/L (ref 20–31)
CREAT SERPL-MCNC: 1.6 MG/DL (ref 0.7–1.2)
EOSINOPHIL # BLD: 0.26 K/UL (ref 0–0.44)
EOSINOPHILS RELATIVE PERCENT: 5 % (ref 1–4)
ERYTHROCYTE [DISTWIDTH] IN BLOOD BY AUTOMATED COUNT: 14.7 % (ref 11.8–14.4)
GFR SERPL CREATININE-BSD FRML MDRD: 42 ML/MIN/1.73M2
GLUCOSE BLD-MCNC: 168 MG/DL (ref 75–110)
GLUCOSE SERPL-MCNC: 153 MG/DL (ref 70–99)
HCT VFR BLD AUTO: 27.6 % (ref 40.7–50.3)
HGB BLD-MCNC: 9 G/DL (ref 13–17)
IMM GRANULOCYTES # BLD AUTO: 0.1 K/UL (ref 0–0.3)
IMM GRANULOCYTES NFR BLD: 2 %
INR PPP: 1.3
LACTATE BLDV-SCNC: 2 MMOL/L (ref 0.5–1.9)
LACTATE BLDV-SCNC: 2.5 MMOL/L (ref 0.5–1.9)
LYMPHOCYTES NFR BLD: 1.62 K/UL (ref 1.1–3.7)
LYMPHOCYTES RELATIVE PERCENT: 29 % (ref 24–43)
MCH RBC QN AUTO: 30 PG (ref 25.2–33.5)
MCHC RBC AUTO-ENTMCNC: 32.6 G/DL (ref 28.4–34.8)
MCV RBC AUTO: 92 FL (ref 82.6–102.9)
MONOCYTES NFR BLD: 0.69 K/UL (ref 0.1–1.2)
MONOCYTES NFR BLD: 12 % (ref 3–12)
NEUTROPHILS NFR BLD: 51 % (ref 36–65)
NEUTS SEG NFR BLD: 2.97 K/UL (ref 1.5–8.1)
NRBC BLD-RTO: 0 PER 100 WBC
PARTIAL THROMBOPLASTIN TIME: 26.4 SEC (ref 23.9–33.8)
PLATELET # BLD AUTO: 302 K/UL (ref 138–453)
PMV BLD AUTO: 8.6 FL (ref 8.1–13.5)
POTASSIUM SERPL-SCNC: 4.1 MMOL/L (ref 3.7–5.3)
PROT SERPL-MCNC: 7 G/DL (ref 6.4–8.3)
PROTHROMBIN TIME: 16.2 SEC (ref 11.5–14.2)
RBC # BLD AUTO: 3 M/UL (ref 4.21–5.77)
RBC # BLD: ABNORMAL 10*6/UL
SODIUM SERPL-SCNC: 132 MMOL/L (ref 135–144)
TROPONIN I SERPL HS-MCNC: 40 NG/L (ref 0–22)
TROPONIN I SERPL HS-MCNC: 40 NG/L (ref 0–22)
WBC OTHER # BLD: 5.7 K/UL (ref 3.5–11.3)

## 2023-08-18 PROCEDURE — 83880 ASSAY OF NATRIURETIC PEPTIDE: CPT

## 2023-08-18 PROCEDURE — 85610 PROTHROMBIN TIME: CPT

## 2023-08-18 PROCEDURE — 87075 CULTR BACTERIA EXCEPT BLOOD: CPT

## 2023-08-18 PROCEDURE — 99223 1ST HOSP IP/OBS HIGH 75: CPT | Performed by: STUDENT IN AN ORGANIZED HEALTH CARE EDUCATION/TRAINING PROGRAM

## 2023-08-18 PROCEDURE — 99285 EMERGENCY DEPT VISIT HI MDM: CPT

## 2023-08-18 PROCEDURE — 93005 ELECTROCARDIOGRAM TRACING: CPT | Performed by: EMERGENCY MEDICINE

## 2023-08-18 PROCEDURE — 83605 ASSAY OF LACTIC ACID: CPT

## 2023-08-18 PROCEDURE — 96374 THER/PROPH/DIAG INJ IV PUSH: CPT

## 2023-08-18 PROCEDURE — 87077 CULTURE AEROBIC IDENTIFY: CPT

## 2023-08-18 PROCEDURE — 87070 CULTURE OTHR SPECIMN AEROBIC: CPT

## 2023-08-18 PROCEDURE — 87186 SC STD MICRODIL/AGAR DIL: CPT

## 2023-08-18 PROCEDURE — 6360000002 HC RX W HCPCS: Performed by: EMERGENCY MEDICINE

## 2023-08-18 PROCEDURE — 96365 THER/PROPH/DIAG IV INF INIT: CPT

## 2023-08-18 PROCEDURE — 82947 ASSAY GLUCOSE BLOOD QUANT: CPT

## 2023-08-18 PROCEDURE — 80053 COMPREHEN METABOLIC PANEL: CPT

## 2023-08-18 PROCEDURE — 2580000003 HC RX 258: Performed by: EMERGENCY MEDICINE

## 2023-08-18 PROCEDURE — 71045 X-RAY EXAM CHEST 1 VIEW: CPT

## 2023-08-18 PROCEDURE — 84484 ASSAY OF TROPONIN QUANT: CPT

## 2023-08-18 PROCEDURE — 85025 COMPLETE CBC W/AUTO DIFF WBC: CPT

## 2023-08-18 PROCEDURE — 87205 SMEAR GRAM STAIN: CPT

## 2023-08-18 PROCEDURE — 85730 THROMBOPLASTIN TIME PARTIAL: CPT

## 2023-08-18 PROCEDURE — G0378 HOSPITAL OBSERVATION PER HR: HCPCS

## 2023-08-18 PROCEDURE — 2580000003 HC RX 258: Performed by: NURSE PRACTITIONER

## 2023-08-18 PROCEDURE — 70450 CT HEAD/BRAIN W/O DYE: CPT

## 2023-08-18 RX ORDER — SODIUM CHLORIDE 0.9 % (FLUSH) 0.9 %
10 SYRINGE (ML) INJECTION PRN
Status: DISCONTINUED | OUTPATIENT
Start: 2023-08-18 | End: 2023-08-19 | Stop reason: HOSPADM

## 2023-08-18 RX ORDER — TAMSULOSIN HYDROCHLORIDE 0.4 MG/1
0.4 CAPSULE ORAL DAILY
Status: DISCONTINUED | OUTPATIENT
Start: 2023-08-19 | End: 2023-08-19 | Stop reason: HOSPADM

## 2023-08-18 RX ORDER — LIDOCAINE 4 G/G
1 PATCH TOPICAL DAILY PRN
Status: DISCONTINUED | OUTPATIENT
Start: 2023-08-18 | End: 2023-08-19 | Stop reason: HOSPADM

## 2023-08-18 RX ORDER — PRAVASTATIN SODIUM 80 MG/1
80 TABLET ORAL NIGHTLY
COMMUNITY

## 2023-08-18 RX ORDER — SODIUM CHLORIDE 9 MG/ML
INJECTION, SOLUTION INTRAVENOUS CONTINUOUS
Status: DISCONTINUED | OUTPATIENT
Start: 2023-08-18 | End: 2023-08-19 | Stop reason: HOSPADM

## 2023-08-18 RX ORDER — ACETAMINOPHEN 650 MG/1
650 SUPPOSITORY RECTAL EVERY 6 HOURS PRN
Status: DISCONTINUED | OUTPATIENT
Start: 2023-08-18 | End: 2023-08-19 | Stop reason: HOSPADM

## 2023-08-18 RX ORDER — COLCHICINE 0.6 MG/1
0.6 TABLET ORAL DAILY
Status: DISCONTINUED | OUTPATIENT
Start: 2023-08-18 | End: 2023-08-19 | Stop reason: HOSPADM

## 2023-08-18 RX ORDER — ATORVASTATIN CALCIUM 20 MG/1
20 TABLET, FILM COATED ORAL NIGHTLY
Status: DISCONTINUED | OUTPATIENT
Start: 2023-08-18 | End: 2023-08-19

## 2023-08-18 RX ORDER — ASPIRIN 325 MG
325 TABLET ORAL DAILY
Status: DISCONTINUED | OUTPATIENT
Start: 2023-08-18 | End: 2023-08-19

## 2023-08-18 RX ORDER — SODIUM CHLORIDE 9 MG/ML
INJECTION, SOLUTION INTRAVENOUS PRN
Status: DISCONTINUED | OUTPATIENT
Start: 2023-08-18 | End: 2023-08-19 | Stop reason: HOSPADM

## 2023-08-18 RX ORDER — ALLOPURINOL 300 MG/1
300 TABLET ORAL DAILY
Status: DISCONTINUED | OUTPATIENT
Start: 2023-08-18 | End: 2023-08-19 | Stop reason: HOSPADM

## 2023-08-18 RX ORDER — PAROXETINE HYDROCHLORIDE 20 MG/1
20 TABLET, FILM COATED ORAL DAILY
Status: DISCONTINUED | OUTPATIENT
Start: 2023-08-19 | End: 2023-08-19 | Stop reason: HOSPADM

## 2023-08-18 RX ORDER — FUROSEMIDE 20 MG/1
20 TABLET ORAL DAILY
Status: DISCONTINUED | OUTPATIENT
Start: 2023-08-18 | End: 2023-08-19 | Stop reason: HOSPADM

## 2023-08-18 RX ORDER — DEXTROSE MONOHYDRATE 100 MG/ML
INJECTION, SOLUTION INTRAVENOUS CONTINUOUS PRN
Status: DISCONTINUED | OUTPATIENT
Start: 2023-08-18 | End: 2023-08-19 | Stop reason: HOSPADM

## 2023-08-18 RX ORDER — SODIUM CHLORIDE 0.9 % (FLUSH) 0.9 %
5-40 SYRINGE (ML) INJECTION EVERY 12 HOURS SCHEDULED
Status: DISCONTINUED | OUTPATIENT
Start: 2023-08-18 | End: 2023-08-19 | Stop reason: HOSPADM

## 2023-08-18 RX ORDER — AMLODIPINE BESYLATE 5 MG/1
5 TABLET ORAL DAILY
Status: DISCONTINUED | OUTPATIENT
Start: 2023-08-18 | End: 2023-08-19 | Stop reason: HOSPADM

## 2023-08-18 RX ORDER — ONDANSETRON 4 MG/1
4 TABLET, ORALLY DISINTEGRATING ORAL EVERY 8 HOURS PRN
Status: DISCONTINUED | OUTPATIENT
Start: 2023-08-18 | End: 2023-08-19 | Stop reason: HOSPADM

## 2023-08-18 RX ORDER — INSULIN LISPRO 100 [IU]/ML
0-4 INJECTION, SOLUTION INTRAVENOUS; SUBCUTANEOUS
Status: DISCONTINUED | OUTPATIENT
Start: 2023-08-19 | End: 2023-08-19 | Stop reason: HOSPADM

## 2023-08-18 RX ORDER — POLYETHYLENE GLYCOL 3350 17 G/17G
17 POWDER, FOR SOLUTION ORAL DAILY PRN
Status: DISCONTINUED | OUTPATIENT
Start: 2023-08-18 | End: 2023-08-19 | Stop reason: HOSPADM

## 2023-08-18 RX ORDER — ONDANSETRON 2 MG/ML
4 INJECTION INTRAMUSCULAR; INTRAVENOUS EVERY 6 HOURS PRN
Status: DISCONTINUED | OUTPATIENT
Start: 2023-08-18 | End: 2023-08-19 | Stop reason: HOSPADM

## 2023-08-18 RX ORDER — INSULIN LISPRO 100 [IU]/ML
0-4 INJECTION, SOLUTION INTRAVENOUS; SUBCUTANEOUS NIGHTLY
Status: DISCONTINUED | OUTPATIENT
Start: 2023-08-18 | End: 2023-08-19 | Stop reason: HOSPADM

## 2023-08-18 RX ORDER — OXYCODONE AND ACETAMINOPHEN 10; 325 MG/1; MG/1
TABLET ORAL
Status: ON HOLD | COMMUNITY
Start: 2023-08-09 | End: 2023-08-19 | Stop reason: ALTCHOICE

## 2023-08-18 RX ORDER — AMIODARONE HYDROCHLORIDE 200 MG/1
200 TABLET ORAL 2 TIMES DAILY
Status: DISCONTINUED | OUTPATIENT
Start: 2023-08-18 | End: 2023-08-19 | Stop reason: HOSPADM

## 2023-08-18 RX ORDER — ACETAMINOPHEN 325 MG/1
650 TABLET ORAL EVERY 6 HOURS PRN
Status: DISCONTINUED | OUTPATIENT
Start: 2023-08-18 | End: 2023-08-19 | Stop reason: HOSPADM

## 2023-08-18 RX ORDER — SEMAGLUTIDE 0.68 MG/ML
1 INJECTION, SOLUTION SUBCUTANEOUS WEEKLY
COMMUNITY
Start: 2023-07-16

## 2023-08-18 RX ORDER — HYALURONATE SODIUM, STABILIZED 60 MG/3 ML
SYRINGE (ML) INTRAARTICULAR
Status: ON HOLD | COMMUNITY
Start: 2023-07-12 | End: 2023-08-19 | Stop reason: ALTCHOICE

## 2023-08-18 RX ORDER — MAGNESIUM SULFATE 1 G/100ML
1000 INJECTION INTRAVENOUS PRN
Status: DISCONTINUED | OUTPATIENT
Start: 2023-08-18 | End: 2023-08-19 | Stop reason: HOSPADM

## 2023-08-18 RX ADMIN — CEFEPIME 2000 MG: 2 INJECTION, POWDER, FOR SOLUTION INTRAVENOUS at 18:47

## 2023-08-18 RX ADMIN — SODIUM CHLORIDE: 9 INJECTION, SOLUTION INTRAVENOUS at 20:44

## 2023-08-18 ASSESSMENT — ENCOUNTER SYMPTOMS
SHORTNESS OF BREATH: 0
ABDOMINAL PAIN: 0

## 2023-08-18 ASSESSMENT — PAIN SCALES - GENERAL
PAINLEVEL_OUTOF10: 0
PAINLEVEL_OUTOF10: 2

## 2023-08-18 ASSESSMENT — PAIN - FUNCTIONAL ASSESSMENT: PAIN_FUNCTIONAL_ASSESSMENT: 0-10

## 2023-08-18 NOTE — ED PROVIDER NOTES
[address] [facility]  Emergency Department Encounter  Emergency Medicine Physician Note     Pt Name:Greg Lin  MRN: [id number]  [address] [facility] [place] 1940  Date of evaluation: 8/18/23  PCP:  Doug Powell DO  Note Started: 5:10 PM EDT      CHIEF COMPLAINT       Chief Complaint   Patient presents with    Dizziness       HISTORY OF PRESENT ILLNESS  (Location/Symptom, Timing/Onset, Context/Setting, Quality, Duration, Modifying Factors, Severity.)      Nicolas Ramirez is a 80 y.o. male who presents with syncopal episode per EMS. Patient with recent history of CABG, secondary to MI. Patient was noted to become lightheaded and dizzy. Patient denies falling or traumatic injury. Patient reports worsening fatigue over the last couple days. Patient states that he felt tired and had to sit down on the stairs because he got dizzy. Patient states that he was out in the sun earlier today, this was the first time he went out in the sun since his CABG. PAST MEDICAL / SURGICAL / SOCIAL / FAMILY HISTORY      has a past medical history of Anxiety, CAD (coronary artery disease), GERD (gastroesophageal reflux disease), Gout, Hyperlipidemia, Hypertension, Lymphoma in remission ([address]), and Type II or unspecified type diabetes mellitus without mention of complication, not stated as uncontrolled. No additional pertinent        has a past surgical history that includes Appendectomy (age 25); Coronary angioplasty with stent; Colonoscopy; back surgery; Carotid endarterectomy (Right); Coronary artery bypass graft (N/A, 7/26/2023); ep device procedure (N/A, 8/3/2023); Cardiac procedure (N/A, 7/25/2023); Cardiac procedure (N/A, 7/25/2023); and Cardiac procedure (N/A, 7/25/2023).   No additional pertinent       Social History     Socioeconomic History    Marital status:      Spouse name: Not on file    Number of children: Not on file    Years of education: Not on file    Highest education level: Not on file   Occupational History program.  Efforts were made to edit the dictations but occasionally words are mis-transcribed.)        Karen Bob DO  08/18/23 4368

## 2023-08-18 NOTE — ED NOTES
ED to inpatient nurses report     Chief Complaint   Patient presents with    Dizziness      Present to ED from home. Pt c/o dizziness and lightheaded. Pt thinks he spent too much time in the sun today. LOC: alert and orientated to name, place, date  Vital signs   Vitals:    08/18/23 1800 08/18/23 1845 08/18/23 1930 08/18/23 1949   BP: 120/62 118/65 116/61    Pulse: 65 68 69    Resp: 18 18 17    Temp:       TempSrc:       SpO2:   96%    Weight:    188 lb (85.3 kg)      Oxygen Baseline: none  Current needs required: none      LDAs:   Peripheral IV 08/18/23 Left Forearm (Active)       Peripheral IV 08/18/23 Right Forearm (Active)     Mobility:  ambulates with walker  Fall Risk: HIGH    Pending ED orders: none    Present condition: stable    Code Status: Full    Consults: IP CONSULT TO HOSPITALIST  [x]  Hospitalist  Completed  [x] yes [] no Who: Intermed  []  Medicine  Completed  [] yes [] No Who:   [x]  Cardiology  Completed  [] yes [x] No Who:   []  GI   Completed  [] yes [] No Who:   []  Neurology  Completed  [] yes [] No Who:   []  Nephrology Completed  [] yes [] No Who:    []  Vascular  Completed  [] yes [] No Who:   []  Ortho  Completed  [] yes [] No Who:     []  Surgery  Completed  [] yes [] No Who:    []  Urology  Completed  [] yes [] No Who:    []  CT Surgery Completed  [] yes [] No Who:   []  Podiatry  Completed  [] yes [] No Who:    []  Other    Completed  [] yes [] No Who:    Interventions: lab work, CT head, chest xray, cefepime, wound cultures    Important Events:   Pt recent STEMI with CABG and stent placement.         Electronically signed by Emily Patel RN on 8/18/2023 at 8:02 PM         Eladia Hollins  08/18/23 2002

## 2023-08-19 ENCOUNTER — APPOINTMENT (OUTPATIENT)
Age: 83
End: 2023-08-19
Attending: INTERNAL MEDICINE
Payer: MEDICARE

## 2023-08-19 VITALS
SYSTOLIC BLOOD PRESSURE: 121 MMHG | HEIGHT: 69 IN | RESPIRATION RATE: 16 BRPM | DIASTOLIC BLOOD PRESSURE: 79 MMHG | HEART RATE: 71 BPM | WEIGHT: 164.4 LBS | BODY MASS INDEX: 24.35 KG/M2 | TEMPERATURE: 97.4 F | OXYGEN SATURATION: 99 %

## 2023-08-19 LAB
ANION GAP SERPL CALCULATED.3IONS-SCNC: 12 MMOL/L (ref 9–17)
ANION GAP SERPL CALCULATED.3IONS-SCNC: 14 MMOL/L (ref 9–17)
BASOPHILS # BLD: 0.06 K/UL (ref 0–0.2)
BASOPHILS NFR BLD: 1 % (ref 0–2)
BUN SERPL-MCNC: 39 MG/DL (ref 8–23)
BUN SERPL-MCNC: 39 MG/DL (ref 8–23)
BUN/CREAT SERPL: 26 (ref 9–20)
BUN/CREAT SERPL: 28 (ref 9–20)
CALCIUM SERPL-MCNC: 8.9 MG/DL (ref 8.6–10.4)
CALCIUM SERPL-MCNC: 9.1 MG/DL (ref 8.6–10.4)
CHLORIDE SERPL-SCNC: 100 MMOL/L (ref 98–107)
CHLORIDE SERPL-SCNC: 99 MMOL/L (ref 98–107)
CO2 SERPL-SCNC: 23 MMOL/L (ref 20–31)
CO2 SERPL-SCNC: 23 MMOL/L (ref 20–31)
CREAT SERPL-MCNC: 1.4 MG/DL (ref 0.7–1.2)
CREAT SERPL-MCNC: 1.5 MG/DL (ref 0.7–1.2)
ECHO AO ROOT DIAM: 2.7 CM
ECHO AO ROOT INDEX: 1.42 CM/M2
ECHO AV AREA PEAK VELOCITY: 0.9 CM2
ECHO AV AREA VTI: 0.9 CM2
ECHO AV AREA/BSA PEAK VELOCITY: 0.5 CM2/M2
ECHO AV AREA/BSA VTI: 0.5 CM2/M2
ECHO AV MEAN GRADIENT: 7 MMHG
ECHO AV MEAN VELOCITY: 1.3 M/S
ECHO AV PEAK GRADIENT: 11 MMHG
ECHO AV PEAK VELOCITY: 1.7 M/S
ECHO AV VELOCITY RATIO: 0.35
ECHO AV VTI: 41.6 CM
ECHO BSA: 2.04 M2
ECHO EST RA PRESSURE: 3 MMHG
ECHO LA AREA 4C: 16.4 CM2
ECHO LA DIAMETER INDEX: 1.63 CM/M2
ECHO LA DIAMETER: 3.1 CM
ECHO LA MAJOR AXIS: 4.9 CM
ECHO LA TO AORTIC ROOT RATIO: 1.15
ECHO LA VOL 4C: 41 ML (ref 18–58)
ECHO LA VOLUME INDEX A4C: 22 ML/M2 (ref 16–34)
ECHO LV E' LATERAL VELOCITY: 13 CM/S
ECHO LV E' SEPTAL VELOCITY: 8 CM/S
ECHO LV FRACTIONAL SHORTENING: 43 % (ref 28–44)
ECHO LV INTERNAL DIMENSION DIASTOLE INDEX: 2.42 CM/M2
ECHO LV INTERNAL DIMENSION DIASTOLIC: 4.6 CM (ref 4.2–5.9)
ECHO LV INTERNAL DIMENSION SYSTOLIC INDEX: 1.37 CM/M2
ECHO LV INTERNAL DIMENSION SYSTOLIC: 2.6 CM
ECHO LV IVSD: 1 CM (ref 0.6–1)
ECHO LV MASS 2D: 181.2 G (ref 88–224)
ECHO LV MASS INDEX 2D: 95.4 G/M2 (ref 49–115)
ECHO LV POSTERIOR WALL DIASTOLIC: 1.2 CM (ref 0.6–1)
ECHO LV RELATIVE WALL THICKNESS RATIO: 0.52
ECHO LVOT AREA: 2.5 CM2
ECHO LVOT AV VTI INDEX: 0.34
ECHO LVOT DIAM: 1.8 CM
ECHO LVOT MEAN GRADIENT: 1 MMHG
ECHO LVOT PEAK GRADIENT: 2 MMHG
ECHO LVOT PEAK VELOCITY: 0.6 M/S
ECHO LVOT STROKE VOLUME INDEX: 18.7 ML/M2
ECHO LVOT SV: 35.6 ML
ECHO LVOT VTI: 14 CM
ECHO MV A VELOCITY: 0.7 M/S
ECHO MV E DECELERATION TIME (DT): 210 MS
ECHO MV E VELOCITY: 0.76 M/S
ECHO MV E/A RATIO: 1.09
ECHO MV E/E' LATERAL: 5.85
ECHO MV E/E' RATIO (AVERAGED): 7.67
ECHO MV E/E' SEPTAL: 9.5
ECHO RIGHT VENTRICULAR SYSTOLIC PRESSURE (RVSP): 32 MMHG
ECHO RV FREE WALL PEAK S': 10 CM/S
ECHO RV TAPSE: 1.5 CM (ref 1.7–?)
ECHO TV REGURGITANT MAX VELOCITY: 2.67 M/S
ECHO TV REGURGITANT PEAK GRADIENT: 29 MMHG
EOSINOPHIL # BLD: 0.26 K/UL (ref 0–0.44)
EOSINOPHILS RELATIVE PERCENT: 4 % (ref 1–4)
ERYTHROCYTE [DISTWIDTH] IN BLOOD BY AUTOMATED COUNT: 14.9 % (ref 11.8–14.4)
GFR SERPL CREATININE-BSD FRML MDRD: 46 ML/MIN/1.73M2
GFR SERPL CREATININE-BSD FRML MDRD: 50 ML/MIN/1.73M2
GLUCOSE BLD-MCNC: 149 MG/DL (ref 75–110)
GLUCOSE BLD-MCNC: 166 MG/DL (ref 75–110)
GLUCOSE SERPL-MCNC: 127 MG/DL (ref 70–99)
GLUCOSE SERPL-MCNC: 167 MG/DL (ref 70–99)
HCT VFR BLD AUTO: 27 % (ref 40.7–50.3)
HGB BLD-MCNC: 8.6 G/DL (ref 13–17)
IMM GRANULOCYTES # BLD AUTO: 0.08 K/UL (ref 0–0.3)
IMM GRANULOCYTES NFR BLD: 1 %
LYMPHOCYTES NFR BLD: 1.56 K/UL (ref 1.1–3.7)
LYMPHOCYTES RELATIVE PERCENT: 25 % (ref 24–43)
MCH RBC QN AUTO: 30.2 PG (ref 25.2–33.5)
MCHC RBC AUTO-ENTMCNC: 31.9 G/DL (ref 28.4–34.8)
MCV RBC AUTO: 94.7 FL (ref 82.6–102.9)
MONOCYTES NFR BLD: 0.67 K/UL (ref 0.1–1.2)
MONOCYTES NFR BLD: 11 % (ref 3–12)
MYOGLOBIN SERPL-MCNC: 31 NG/ML (ref 28–72)
NEUTROPHILS NFR BLD: 58 % (ref 36–65)
NEUTS SEG NFR BLD: 3.68 K/UL (ref 1.5–8.1)
NRBC BLD-RTO: 0 PER 100 WBC
PLATELET # BLD AUTO: 314 K/UL (ref 138–453)
PMV BLD AUTO: 9.1 FL (ref 8.1–13.5)
POTASSIUM SERPL-SCNC: 4.3 MMOL/L (ref 3.7–5.3)
POTASSIUM SERPL-SCNC: 4.3 MMOL/L (ref 3.7–5.3)
RBC # BLD AUTO: 2.85 M/UL (ref 4.21–5.77)
RBC # BLD: ABNORMAL 10*6/UL
SODIUM SERPL-SCNC: 135 MMOL/L (ref 135–144)
SODIUM SERPL-SCNC: 136 MMOL/L (ref 135–144)
TROPONIN I SERPL HS-MCNC: 43 NG/L (ref 0–22)
WBC OTHER # BLD: 6.3 K/UL (ref 3.5–11.3)

## 2023-08-19 PROCEDURE — 84484 ASSAY OF TROPONIN QUANT: CPT

## 2023-08-19 PROCEDURE — 93306 TTE W/DOPPLER COMPLETE: CPT

## 2023-08-19 PROCEDURE — 97116 GAIT TRAINING THERAPY: CPT

## 2023-08-19 PROCEDURE — 82947 ASSAY GLUCOSE BLOOD QUANT: CPT

## 2023-08-19 PROCEDURE — 36415 COLL VENOUS BLD VENIPUNCTURE: CPT

## 2023-08-19 PROCEDURE — 97535 SELF CARE MNGMENT TRAINING: CPT

## 2023-08-19 PROCEDURE — 6370000000 HC RX 637 (ALT 250 FOR IP): Performed by: STUDENT IN AN ORGANIZED HEALTH CARE EDUCATION/TRAINING PROGRAM

## 2023-08-19 PROCEDURE — G0378 HOSPITAL OBSERVATION PER HR: HCPCS

## 2023-08-19 PROCEDURE — 97530 THERAPEUTIC ACTIVITIES: CPT

## 2023-08-19 PROCEDURE — 6370000000 HC RX 637 (ALT 250 FOR IP): Performed by: NURSE PRACTITIONER

## 2023-08-19 PROCEDURE — 99232 SBSQ HOSP IP/OBS MODERATE 35: CPT | Performed by: STUDENT IN AN ORGANIZED HEALTH CARE EDUCATION/TRAINING PROGRAM

## 2023-08-19 PROCEDURE — 6370000000 HC RX 637 (ALT 250 FOR IP)

## 2023-08-19 PROCEDURE — 97163 PT EVAL HIGH COMPLEX 45 MIN: CPT

## 2023-08-19 PROCEDURE — 2580000003 HC RX 258: Performed by: NURSE PRACTITIONER

## 2023-08-19 PROCEDURE — 85025 COMPLETE CBC W/AUTO DIFF WBC: CPT

## 2023-08-19 PROCEDURE — 97166 OT EVAL MOD COMPLEX 45 MIN: CPT

## 2023-08-19 PROCEDURE — 83874 ASSAY OF MYOGLOBIN: CPT

## 2023-08-19 PROCEDURE — 80048 BASIC METABOLIC PNL TOTAL CA: CPT

## 2023-08-19 RX ORDER — ASPIRIN 81 MG/1
81 TABLET, CHEWABLE ORAL DAILY
Status: DISCONTINUED | OUTPATIENT
Start: 2023-08-20 | End: 2023-08-19 | Stop reason: HOSPADM

## 2023-08-19 RX ORDER — METOPROLOL SUCCINATE 50 MG/1
50 TABLET, EXTENDED RELEASE ORAL DAILY
COMMUNITY

## 2023-08-19 RX ORDER — LANOLIN ALCOHOL/MO/W.PET/CERES
9 CREAM (GRAM) TOPICAL NIGHTLY
Status: DISCONTINUED | OUTPATIENT
Start: 2023-08-19 | End: 2023-08-19 | Stop reason: HOSPADM

## 2023-08-19 RX ORDER — DOCUSATE SODIUM 100 MG/1
100 CAPSULE, LIQUID FILLED ORAL DAILY
COMMUNITY

## 2023-08-19 RX ORDER — LANOLIN ALCOHOL/MO/W.PET/CERES
3 CREAM (GRAM) TOPICAL NIGHTLY
Status: DISCONTINUED | OUTPATIENT
Start: 2023-08-20 | End: 2023-08-19

## 2023-08-19 RX ORDER — LANOLIN ALCOHOL/MO/W.PET/CERES
CREAM (GRAM) TOPICAL
Status: COMPLETED
Start: 2023-08-19 | End: 2023-08-19

## 2023-08-19 RX ORDER — LORAZEPAM 0.5 MG/1
0.5 TABLET ORAL EVERY 8 HOURS PRN
Status: DISCONTINUED | OUTPATIENT
Start: 2023-08-19 | End: 2023-08-19 | Stop reason: HOSPADM

## 2023-08-19 RX ORDER — SODIUM CHLORIDE 1 G/1
1 TABLET ORAL EVERY 12 HOURS
COMMUNITY

## 2023-08-19 RX ORDER — METOPROLOL SUCCINATE 50 MG/1
50 TABLET, EXTENDED RELEASE ORAL DAILY
Status: DISCONTINUED | OUTPATIENT
Start: 2023-08-19 | End: 2023-08-19 | Stop reason: HOSPADM

## 2023-08-19 RX ORDER — PRAVASTATIN SODIUM 40 MG
80 TABLET ORAL NIGHTLY
Status: DISCONTINUED | OUTPATIENT
Start: 2023-08-19 | End: 2023-08-19 | Stop reason: HOSPADM

## 2023-08-19 RX ADMIN — Medication 3 MG: at 02:22

## 2023-08-19 RX ADMIN — APIXABAN 2.5 MG: 2.5 TABLET, FILM COATED ORAL at 09:32

## 2023-08-19 RX ADMIN — TAMSULOSIN HYDROCHLORIDE 0.4 MG: 0.4 CAPSULE ORAL at 09:32

## 2023-08-19 RX ADMIN — SODIUM CHLORIDE, PRESERVATIVE FREE 10 ML: 5 INJECTION INTRAVENOUS at 13:00

## 2023-08-19 RX ADMIN — Medication 12.5 MG: at 09:32

## 2023-08-19 RX ADMIN — AMLODIPINE BESYLATE 5 MG: 5 TABLET ORAL at 09:32

## 2023-08-19 RX ADMIN — AMIODARONE HYDROCHLORIDE 200 MG: 200 TABLET ORAL at 09:32

## 2023-08-19 RX ADMIN — ASPIRIN 325 MG: 325 TABLET ORAL at 09:32

## 2023-08-19 RX ADMIN — PAROXETINE HYDROCHLORIDE 20 MG: 20 TABLET, FILM COATED ORAL at 09:32

## 2023-08-19 RX ADMIN — LORAZEPAM 0.5 MG: 0.5 TABLET ORAL at 13:01

## 2023-08-19 NOTE — PROGRESS NOTES
[facility]  Office: [address], DO, Arabella Winfield, DO, Robyn Faithia, DO, Apolinar Leaver Blood, DO, Mary Palafox MD, Armani Caballero MD, Matilda Taylor MD, Karrie Israel MD,  Coleen Morton MD, Lorie Nyhan, MD, Danilo Golden, DO, Trevin Lora MD,  Ishan Ocampo MD, Colleen Roca MD, Letty Rogers DO, Jake Majano MD,  Shaista Hagen DO, Rossy Diggs MD, Thea Cancino MD, Naomy Lewis MD, Liz Crump MD,  Lizz Baca MD, Chaya Zambrano MD, Micheline Dandy, MD, Craig Acevedo, DO, Rubia Field MD,  Angelic Edmondson MD, Idris Quintero, CNP,  Rona Arechiga, CNP, Shyann Osuna, CNP, Anay Garnett, CNP,  Kristan Wang, [facility], Fely Morel, CNP, Jose Mims, CNP, Ilya Mckee, CNP, Andria Castro, CNP, Johnny Gates, CNP, Chris Prince PA-C, Ruth Velasquez, SWATI, Juan F Franco, [facility], Kelechi Rizzo, [address] [place] [address] [place] Jain    Progress Note    8/19/2023    8:11 AM    Name:   Leeanne Elmore  MRN:     [id number]     Acct:      [de-identified]   Room:   [address] [place] [address] Day:  0  Admit Date:  8/18/2023  4:56 PM    PCP:   Shira Turcios DO  Code Status:  Full Code    Subjective:     C/C:   Chief Complaint   Patient presents with    Dizziness     Interval History Status: improved.    Denies any further episodes of dizziness  Denies any chest pain, shortness of breath, palpitation  Patient he was working out and was out in the sun for longer time when he felt dizzy  Patient has recent CABG and pacemaker placement 2 weeks ago  Sternal wound is healing with some dried scar tissue  Per patient there was some concern of discharge yesterday  He was started on cefepime  I did not notice any discharge on examination  He denies any pain at the site of scar, fever, chills, cough, shortness of breath, phlegm production  Brief History:   Per documentation  77-year-old male with a past medical history of gram variable rods    3 . CAD-status post CABG. continue aspirin and Eliquis. Continue beta-blocker. Continue amiodarone. 4. PAF-continue Eliquis and amiodarone and metoprolol    5  Anxiety/depression-continue home medications. 6  Normocytic anemia-hemoglobin stable. Monitor CBC     7 Hyponatremia- resolved  8  Essential hypertension-continue amlodipine and metoprolol. 9   Type 2 diabetes-SSI. .  Diabetic diet. Hold oral antihyperglycemics inpatient. 10  Hyperlipidemia-continue statin. 11   Gout-hold  allopurinol.      Vinh Marrero MD  8/19/2023  8:11 AM

## 2023-08-19 NOTE — PROGRESS NOTES
Pt agitated with being woken up for vitals. Pt states to get no sleep when staff has to go in room routinely. Education provided on reason for vitals and frequent rounding.

## 2023-08-19 NOTE — PROGRESS NOTES
Dr Omar Carrasco in to see patient, updated writer on plan, states that he thinks patient should stay overnight for further observation, writer called to patient's room, patient states that he is \"ready to [place]", wife at bedside, informed that cardiologist stated that he recommends that he stays overnight, patient states that he is not staying, that being in the hospital is bad for his mental and he is \"ready to go\", patient offered encouragement, Dr Omar Carrasco notified at the nurses station and Dr Sridevi Quintero notified via perfect serve and asked if she would like to come speak with patient prior to having IV removed, made aware that Dr Omar Carrasco had seen patient, no change n plan of care received

## 2023-08-19 NOTE — PROGRESS NOTES
Transitions of Care Pharmacy Service   Medication Review    The patient's list of current home medications has been reviewed and updated. Source(s) of information: Patient, Harshil (mario on [address]), [employer], electronic chart    Please note:   -He wasn't using his lantus 10 units nightly prescribed after hospitalization on 8/9 as he didn't think he needed it. However, he was not checking his BG. I encouraged him to ask Dr. Sandor Patel for a meter and strips and start checking at least a few times weekly in the morning to ensure fasting BG is under 130 per ADA recommendations. He is unsure if he should be taking glipizide, but has been taking this along with metformin and Ozempic for diabetes.    -He also wishes to stop the allopurinol as he does not think he gets gout often enough to take it. I recommended he talk to Dr. Sandor Patel about this as an outpatient.     -Lisinopril was stopped on 8/6 per chart    -Lasix was recently decreased from BID to daily as postop swelling has subsided. I educated him to ask about potassium if lasix is ever discontinued. Please feel free to call with any questions about this encounter. Thank you. Kameron Chavez, [facility]  Transitions of Care Pharmacy Service  Phone:  [phone]  Fax: [phone]      Prior to Admission medications    Medication Sig Start Date End Date Taking? Authorizing Provider   metoprolol succinate (TOPROL XL) 50 MG extended release tablet Take 1 tablet by mouth daily   Yes Historical Provider, MD   sodium chloride 1 g tablet Take 1 tablet by mouth in the morning and 1 tablet in the evening.    Yes Historical Provider, MD   docusate sodium (COLACE) 100 MG capsule Take 1 capsule by mouth Daily   Yes Historical Provider, MD   pravastatin (PRAVACHOL) 80 MG tablet Take 1 tablet by mouth nightly   Yes Historical Provider, MD   OZEMPIC, 0.25 OR 0.5 MG/DOSE, 2 MG/3ML SOPN Inject 1 mg into the skin once a week 7/16/23   Historical Provider, MD   PARoxetine

## 2023-08-19 NOTE — PROGRESS NOTES
IV and telemetry removed, home medications given to patient from lock-up, patient taken with personal belongings to emergency room exit where private vehicle was parked, patient discharged home with wife

## 2023-08-19 NOTE — PROGRESS NOTES
Occupational Therapy  Facility/Department: [facility] PROGRESSIVE CARE  Occupational Therapy Initial Assessment    Name: Alvarez Winston  : 1940  MRN: [id number]  Date of Service: 2023    JAY River reports patient is medically stable for therapy treatment this date. Chart reviewed prior to treatment and patient is agreeable for therapy. All lines intact and patient positioned comfortably at end of treatment. All patient needs addressed prior to ending therapy session. Due to recent hospitalization and medical condition, pt would benefit from additional intermittent skilled therapy at time of discharge. Please refer to the AM-PAC score for current functional status. Discharge Recommendations:  Patient would benefit from continued therapy after discharge  OT Equipment Recommendations  Equipment Needed: Yes  Mobility Devices: ADL Assistive Devices  ADL Assistive Devices: Reacher;Long-handled Shoe [place];Long-handled Sponge;Emergency Alert System       Patient Diagnosis(es): The primary encounter diagnosis was S/P CABG (coronary artery bypass graft). A diagnosis of Dizziness was also pertinent to this visit. Past Medical History:  has a past medical history of Anxiety, CAD (coronary artery disease), GERD (gastroesophageal reflux disease), Gout, Hyperlipidemia, Hypertension, Lymphoma in remission ([address]), and Type II or unspecified type diabetes mellitus without mention of complication, not stated as uncontrolled. Past Surgical History:  has a past surgical history that includes Appendectomy (age 25); Coronary angioplasty with stent; Colonoscopy; back surgery; Carotid endarterectomy (Right); Coronary artery bypass graft (N/A, 2023); ep device procedure (N/A, 8/3/2023); Cardiac procedure (N/A, 2023); Cardiac procedure (N/A, 2023); and Cardiac procedure (N/A, 2023). PER H&P:    Alvarez Winston is a 80 y.o.  Non- / non  male who presents with Dizziness   and is

## 2023-08-19 NOTE — PROGRESS NOTES
AMA document presented and signed, patient instructed to return if symptoms reoccur, understanding verbalized

## 2023-08-19 NOTE — PROGRESS NOTES
Patient admitted to room 1004 VS taken, telemetry placed and call light given/within reach. Patient A&O and given room orientation. Orders released/reviewed, initial assessment completed and navigator started. See chart for more detail.

## 2023-08-19 NOTE — PROGRESS NOTES
Physical Therapy  Facility/Department: [facility] PROGRESSIVE CARE  Physical Therapy Initial Assessment    Name: Leeanne Elmore  : 1940  MRN: [id number]  Date of Service: 2023    Discharge Recommendations:  Patient would benefit from continued therapy after discharge          Patient Diagnosis(es): The primary encounter diagnosis was S/P CABG (coronary artery bypass graft). A diagnosis of Dizziness was also pertinent to this visit. Past Medical History:  has a past medical history of Anxiety, CAD (coronary artery disease), GERD (gastroesophageal reflux disease), Gout, Hyperlipidemia, Hypertension, Lymphoma in remission ([address]), and Type II or unspecified type diabetes mellitus without mention of complication, not stated as uncontrolled. Past Surgical History:  has a past surgical history that includes Appendectomy (age 25); Coronary angioplasty with stent; Colonoscopy; back surgery; Carotid endarterectomy (Right); Coronary artery bypass graft (N/A, 2023); ep device procedure (N/A, 8/3/2023); Cardiac procedure (N/A, 2023); Cardiac procedure (N/A, 2023); and Cardiac procedure (N/A, 2023). Assessment   Body Structures, Functions, Activity Limitations Requiring Skilled Therapeutic Intervention: Decreased endurance  Assessment: Pt lacks pre sx level of activity tolerance  Therapy Prognosis: Good  Decision Making: High Complexity  Requires PT Follow-Up: Yes  Activity Tolerance  Activity Tolerance: Patient limited by endurance; Patient tolerated treatment well     Plan   Physcial Therapy Plan  General Plan: 5-7 times per week  Current Treatment Recommendations: Transfer training, Gait training, Stair training, Endurance training, Therapeutic activities  Safety Devices  Type of Devices: Chair alarm in place, Call light within reach, Left in chair, Gait belt, Nurse notified  Restraints  Restraints Initially in Place: No     Restrictions  Restrictions/Precautions  Restrictions/Precautions: General falls)  Bed mobility  Rolling to Left: Modified independent  Rolling to Right: Modified independent  Supine to Sit: Modified independent  Sit to Supine: Modified independent  Scooting: Modified independent  Transfers  Sit to Stand: Stand by assistance  Stand to Sit: Stand by assistance  Stand Pivot Transfers: Stand by assistance  Ambulation  Surface: Level tile  Device: Rolling Walker  Assistance: Stand by assistance  Gait Deviations: Slow Bela  Distance: 210' x 1  Comments: To chair     Balance  Posture: Good  Sitting - Static: Good  Sitting - Dynamic: Good  Standing - Static: Good (w/ RW)  Standing - Dynamic: Good (w/ RW)           OutComes Score  Balance Score: 11 (08/19/23 1420)  Gait Score: 8 (08/19/23 1420)        Tinetti Total Score: 19 (08/19/23 1420)                                   AM-PAC Score  AM-PAC Inpatient Mobility Raw Score : 20 (08/19/23 1420)  AM-PAC Inpatient T-Scale Score : 47.67 (08/19/23 1420)  Mobility Inpatient CMS 0-100% Score: 35.83 (08/19/23 1420)  Mobility Inpatient CMS G-Code Modifier : CJ (08/19/23 1420)          Tinneti Score  Balance Score: 11 (08/19/23 1420)  Gait Score: 8 (08/19/23 1420)  Tinetti Total Score: 19 (08/19/23 1420)  Tinetti Disability Index: 20-39% (08/19/23 1420)    Goals  Short Term Goals  Time Frame for Short Term Goals: 12 treatments  Short Term Goal 1: Independent transfers  Short Term Goal 2: Independent ambulation 400' x 1 without assistive device  Short Term Goal 3: Tolerate 30 min ther act  Short Term Goal 4:  Independently ascend/descend 14 steps w/ B HR  Patient Goals   Patient Goals : Home       Education  Patient Education  Education Given To: Patient  Education Provided: Role of Therapy;Plan of Care;Energy Conservation  Education Provided Comments: Handouts: ankle pumps,energy conservation  Education Method: Demonstration;Verbal;Printed Information/Hand-outs  Barriers to Learning: None  Education Outcome: Verbalized understanding;Demonstrated

## 2023-08-19 NOTE — PROGRESS NOTES
Pt has ECHO ordered for today per Cardio; Dr Foss Comes. Pt has IV fluids infusing, this was verified with NP S[facility] despite significant cardiac history. Pt has pacemaker. Multiple incision sites from previous admission. Chest, abd, and sternum- all CLAUDIA. Drainage noted and swabbed. + for rare gram variable rods. IV Cefepime ordered. VSS. Bed alarm on and call light in reach.

## 2023-08-19 NOTE — PROGRESS NOTES
[x] There are one or more home medications that need clarification before Medication Reconciliation can be completed. The Med List Status has been marked as In Progress. To assist with Home Medication Reconciliation the following actions have been taken:    [x] Pharmacy medication reconciliation service requested. (Note: This can be done by sending a Perfect Serve message to [facility] Rec Pharmacist or by Ирина Sidhu [phone].)            Pt brought in home medications, sent to pharmacy to verify but unable to be completed due to medications not matching up. [facility] rec to verify. Meds in lock up.

## 2023-08-19 NOTE — PLAN OF CARE
Admission completed   Family in at bedside, home medications sent to pharmacy to be verified. Medications reconciled with family at bed sided, new medications added. Home med list is up to date. Patient resting in bed at this time. Denies any pain, only complains of generalized fatigue. Problem: Discharge Planning  Goal: Discharge to home or other facility with appropriate resources  Outcome: Progressing     Problem: Pain  Goal: Verbalizes/displays adequate comfort level or baseline comfort level  Outcome: Progressing     Problem: Safety - Adult  Goal: Free from fall injury  Outcome: Progressing     Problem: ABCDS Injury Assessment  Goal: Absence of physical injury  Outcome: Progressing     Problem: Skin/Tissue Integrity  Goal: Absence of new skin breakdown  Description: 1. Monitor for areas of redness and/or skin breakdown  2. Assess vascular access sites hourly  3. Every 4-6 hours minimum:  Change oxygen saturation probe site  4. Every 4-6 hours:  If on nasal continuous positive airway pressure, respiratory therapy assess nares and determine need for appliance change or resting period.   Outcome: Progressing     Problem: Risk for Elopement  Goal: Patient will not exit the unit/facility without proper excort  Outcome: Progressing

## 2023-08-19 NOTE — ACP (ADVANCE CARE PLANNING)
Advance Care Planning     Advance Care Planning Activator (Inpatient)  Conversation Note      Date of ACP Conversation: 8/19/2023     Conversation Conducted with: Patient with Decision Making Capacity    ACP Activator: Kimberli Santizo RN        Health Care Decision Maker: self     Current Designated Health Care Decision Maker:     Primary Decision Maker: Kira Ordonez - [phone]  Click here to complete [address] including section of the Healthcare Decision Maker Relationship (ie \"Primary\")  Today we documented Decision Maker(s) consistent with Legal Next of Kin hierarchy. Care Preferences    Ventilation: \"If you were in your present state of health and suddenly became very ill and were unable to breathe on your own, what would your preference be about the use of a ventilator (breathing machine) if it were available to you? \"      Would the patient desire the use of ventilator (breathing machine)?: yes    Agrees with Full code-no formal HCPOA/Living Will, agrees with wife as next of kin.      [] Yes   [] No   Educated Patient / Decision Maker regarding differences between Advance Directives and portable DNR orders.     Length of ACP Conversation in minutes:  10    Conversation Outcomes:  ACP discussion completed    Follow-up plan:    [] Schedule follow-up conversation to continue planning  [] Referred individual to Provider for additional questions/concerns   [] Advised patient/agent/surrogate to review completed ACP document and update if needed with changes in condition, patient preferences or care setting    [] This note routed to one or more involved healthcare providers

## 2023-08-19 NOTE — PROGRESS NOTES
Call received from [facility] Paula Rose, updated, labs reviewed and questions answered

## 2023-08-20 LAB
EKG ATRIAL RATE: 66 BPM
EKG P AXIS: 45 DEGREES
EKG P-R INTERVAL: 242 MS
EKG Q-T INTERVAL: 470 MS
EKG QRS DURATION: 172 MS
EKG QTC CALCULATION (BAZETT): 492 MS
EKG R AXIS: -77 DEGREES
EKG T AXIS: 44 DEGREES
EKG VENTRICULAR RATE: 66 BPM
MICROORGANISM SPEC CULT: ABNORMAL
MICROORGANISM SPEC CULT: ABNORMAL
MICROORGANISM/AGENT SPEC: ABNORMAL
MICROORGANISM/AGENT SPEC: ABNORMAL
SPECIMEN DESCRIPTION: ABNORMAL

## 2023-08-20 NOTE — DISCHARGE SUMMARY
[facility]  Office: [address], DO, Patsy Ramos, DO, Marcel Boyerex, DO, Quiana Nielsen Blood, DO, Hailey Manjarrez MD, Paige Arreola MD, Storm Tyson MD, Kayli Rossi MD,  Lisette Flaherty MD, Mook Nash MD, John Stallings, DO, Yasmin Diaz MD,  Alice Stearns, DO, Ronnell Simms MD, Coral العراقي MD, Alonso Naylor, DO, Ayanna Cline MD,  Homar Herzog, DO, Sharonda Bach MD, Alpesh Tejada MD, Canelo Carmona MD, Modesto Rendon MD,  Isak Gonzales MD, Kellie Alonzo MD, Radha Mejia MD, Vish Gonzalez, DO, Flavio Link MD,  Krysten Link MD, Danae Whitley, CNP,  Marquez Herrera, CNP, Ginna Neville, CNP, Marshall Holcomb, CNP,  Shelly Solano, [facility], Mode Joe, CNP, Sarah King, CNP, Julia Cristina, CNP, Maurisio Murphy, CNP, Yanira Franks, CNP, Stepan Saucedo PA-C, Brandon Crowe, [facility], Jono Lane, CNP, Ariane Leigh, [facility]    Discharge Summary     Patient ID: Steve Garnett  :  1940   MRN: [id number]     ACCOUNT:  [de-identified]   Patient's PCP: Brant Arcos DO  Admit Date: 2023   Discharge Date: 2023 left AMA    Code Status:  Prior  Admitting Physician: Storm Tyson MD  Discharge Physician: Canelo Camrona MD     Active Discharge Diagnoses:     Hospital Problem Lists:  Principal Problem:    Generalized weakness  Active Problems:    Arteriosclerosis of arterial coronary artery bypass graft    Lymphoma in remission [facility])    Type 2 diabetes mellitus with hyperglycemia, without long-term current use of insulin (HCC)    GERD (gastroesophageal reflux disease)    HTN (hypertension)    Multiple vessel coronary artery disease    Hyponatremia    S/P CABG (coronary artery bypass graft)    S/P placement of cardiac pacemaker  Resolved Problems:    * No resolved hospital problems.  *      Admission Condition:  fair     Discharged Condition: ;left [address] [place] [address] Drive Stay:

## 2023-08-21 ENCOUNTER — CARE COORDINATION (OUTPATIENT)
Dept: CASE MANAGEMENT | Age: 83
End: 2023-08-21

## 2023-08-21 LAB
MICROORGANISM SPEC CULT: ABNORMAL
MICROORGANISM/AGENT SPEC: ABNORMAL
MICROORGANISM/AGENT SPEC: ABNORMAL
SPECIMEN DESCRIPTION: ABNORMAL

## 2023-08-21 RX ORDER — CIPROFLOXACIN 500 MG/1
500 TABLET, FILM COATED ORAL 2 TIMES DAILY
Qty: 14 TABLET | Refills: 0 | Status: SHIPPED | OUTPATIENT
Start: 2023-08-21 | End: 2023-08-28

## 2023-08-21 NOTE — CARE COORDINATION
[facility] Care Transitions Initial Follow Up Call - patient in background, but spoke primarily with daughter, Katherine Valentin who is an ICU nurse    Call within 2 business days of discharge: Yes    Patient Current Location:  Home: [address] [place] [address]    Care Transition Nurse contacted the family by telephone to perform post hospital discharge assessment. Verified name and  with family as identifiers. Provided introduction to self, and explanation of the Care Transition Nurse role. Patient: Phil Nance   Patient : 1940   MRN: [id number]    Reason for Admission: Generalized weakness, vasovagal or orthostasis, S/P recent CABG/ppm, possible RLL PNE   Discharge Date: 23   RARS: Readmission Risk Score: 17.6      Last Discharge [address]       Date Complaint Diagnosis Description Type Department Provider    23 Dizziness S/P CABG (coronary artery bypass graft) . .. ED to Hosp-Admission (Discharged) (ADMITTED) EMMA Chavez MD; Deysi Mews Ru. .. Was this an external facility discharge? No   Challenges to be reviewed by the provider   Additional needs identified to be addressed with provider: Yes  Spoke with daughter who is a nurse - aware that patient needs a HFU appt for meds, orders               Method of communication with provider:  routed . Admitted  for lightheadedness, possibly vagal - responded to increased IVF. Stopped lasix while hospitalized. Was discharged after CABG + PPM on  & [facility]  His daughter is an ICU nurse that keeps good eyes on him. I spoke with daughter, Katherine Valentin for this initial discharge call. Katherine Valentin reports that patient is eating, drinking - denies any further lightheadedness. Tolerating fluids well, eating. Denies any visible signs of suspected RLL PNE.   Ambulating in house - anxious to get started with cardiac rehab sooner - plans to check with surgeon at post op appt on ,  Daughter is planning on

## 2023-08-24 ENCOUNTER — OFFICE VISIT (OUTPATIENT)
Dept: CARDIOTHORACIC SURGERY | Age: 83
End: 2023-08-24

## 2023-08-24 VITALS
DIASTOLIC BLOOD PRESSURE: 71 MMHG | WEIGHT: 172.4 LBS | OXYGEN SATURATION: 98 % | RESPIRATION RATE: 20 BRPM | TEMPERATURE: 98.7 F | SYSTOLIC BLOOD PRESSURE: 130 MMHG | HEART RATE: 69 BPM | HEIGHT: 69 IN | BODY MASS INDEX: 25.53 KG/M2

## 2023-08-24 DIAGNOSIS — Z95.1 S/P CABG X 3: Primary | ICD-10-CM

## 2023-08-24 PROCEDURE — 99024 POSTOP FOLLOW-UP VISIT: CPT | Performed by: NURSE PRACTITIONER

## 2023-08-29 ENCOUNTER — ENROLLMENT (OUTPATIENT)
Dept: CASE MANAGEMENT | Age: 83
End: 2023-08-29

## 2023-08-31 ENCOUNTER — CARE COORDINATION (OUTPATIENT)
Dept: CASE MANAGEMENT | Age: 83
End: 2023-08-31

## 2023-08-31 NOTE — CARE COORDINATION
[facility] Care Transitions Follow Up Call    Patient Current Location:  Home: [address], [address] Coordinator contacted the family by telephone to follow up after admission on . Verified name and  with family as identifiers. Patient: Olga Edmond  Patient : 1940   MRN: [id number]  Reason for Admission: Generalized weakness   Discharge Date: 23 RARS: Readmission Risk Score: 17.6      Needs to be reviewed by the provider   Additional needs identified to be addressed with provider: No  none             Method of communication with provider: none. Subsequent transitional call. Spoke to daughter Deborah Pack. She denies that her dad has lightheadedness sob orthostatic hypotension cp congestion or palpitations. She states his heart fraction is 55% he is staying off the lasix. His amiodarone was stopped. Thinks that some symptoms was caused d/t medication. She denies bilateral swelling in the legs. Sternal site good no s/s infection. Appetite is good. No concerns with B/B. She reports he has some depression and is on a SSI for that. He will be starting cardiac rehab on . Addressed changes since last contact:   post op appointment  cardiac rehab  Discussed follow-up appointments. If no appointment was previously scheduled, appointment scheduling offered: Yes. Is follow up appointment scheduled within 7 days of discharge? Yes. Follow Up  Future Appointments   Date Time Provider [address] [place] [address]   2023 11:30 AM STA CARDIAC REHAB RM 01 [facility] Card Re [facility]   2023  1:30 PM Tiffani Arevalo, APRN - CNP AFL [facility]         LPN Care Coordinator reviewed discharge instructions, medical action plan, and red flags with family and discussed any barriers to care and/or understanding of plan of care after discharge.  Discussed appropriate site of care based on symptoms and resources available to patient including: PCP  Specialist  When to call [phone]

## 2023-09-01 ENCOUNTER — HOSPITAL ENCOUNTER (OUTPATIENT)
Dept: CARDIAC REHAB | Age: 83
Setting detail: THERAPIES SERIES
Discharge: HOME | End: 2023-09-01
Payer: MEDICARE

## 2023-09-01 VITALS — WEIGHT: 169.2 LBS | OXYGEN SATURATION: 96 % | RESPIRATION RATE: 18 BRPM | BODY MASS INDEX: 25.06 KG/M2 | HEIGHT: 69 IN

## 2023-09-01 PROCEDURE — 93798 PHYS/QHP OP CAR RHAB W/ECG: CPT

## 2023-09-01 PROCEDURE — 93797 PHYS/QHP OP CAR RHAB WO ECG: CPT

## 2023-09-01 ASSESSMENT — EJECTION FRACTION: EF_VALUE: 30

## 2023-09-01 ASSESSMENT — EXERCISE STRESS TEST
PEAK_BP: 128/70
PEAK_METS: 3.5
PEAK_RPE: 12
PEAK_HR: 103
PEAK_BP: 128/70

## 2023-09-01 ASSESSMENT — PATIENT HEALTH QUESTIONNAIRE - PHQ9
SUM OF ALL RESPONSES TO PHQ QUESTIONS 1-9: 0
SUM OF ALL RESPONSES TO PHQ9 QUESTIONS 1 & 2: 0
SUM OF ALL RESPONSES TO PHQ QUESTIONS 1-9: 0
SUM OF ALL RESPONSES TO PHQ QUESTIONS 1-9: 0
2. FEELING DOWN, DEPRESSED OR HOPELESS: 0
SUM OF ALL RESPONSES TO PHQ QUESTIONS 1-9: 0
1. LITTLE INTEREST OR PLEASURE IN DOING THINGS: 0

## 2023-09-01 NOTE — FLOWSHEET NOTE
09/01/23 1140   Treatment Diagnosis   Treatment Diagnosis 1 CABG   CABG Date 07/25/23   Referral Date 08/07/23   Significant Cardiovascular History Pacemaker   Co-morbidities Malignancy;Diabetes   Oxygen Saturation / Titration    Stages of Change  Action   Oxygen Intervention   Oxygen Use No   Individual Treatment Plan   ITP Visit Type Initial assessment   1st Date of Exercise  09/01/23   ITP Next Review Date 09/29/23   Visit #/Total Visits 36   EF% 30 %   Risk Stratification High   ITP Exercise;Psychosocial;Nutrition;Education   Exercise    Giles Total Score 25   DASI Total Score 24.2   Stages of Change Action   Assisted Devices Walker  (uses a walker at home at times, since surgery)   Exercise Prescription   Mode Treadmill;Ergometer;Elliptical;Bike   Frequency per week 3x week   Duration Per Session 31 minutes, to increase Q 3rd visit   Intensity METS       3.5  (NS=3.5    TM=1.6)   RPE 12   Progression increase intensity per RPE scale   Resistance Training Yes   Exercise Blood Pressures   Resting /60   Peak /70   Is BP WDL Yes   Exercise Activity at Home   Type walking   Frequency daily   Duration 2-40 minutes   Resistance Training No   Exercise Education   Education Self pulse;Exercise safety;Signs/symptoms to report;RPE scale;Equipment orientation; Warm up/cool down;Physically active   Exercise Target Goal   Target Goal(s) Individual exercise RX;BP < 140/90 or < 130/80, if DM or CKD; Aerobic activity 30 + minutes/day  5 days/week   Patient Stated Exercise Goals \"get stronger, back to previous activity level\"   Psychosocial   Stages of Change Action   PHQ-9 Total Score 0   Psychosocial Intervention   Interventions Currently under treatment for depression; No intervention indicated   Currently Taking Psychotropic Meds Yes   Medication Changes No   Psychosocial Education   Education Impact self care behaviors on health;Signs/symptoms of depression   Psychosocial Target Goals   Target Goal(s) Assess

## 2023-09-01 NOTE — CARDIO/PULMONARY
Pt oriented to Cardiac Rehab, goal set and ITP initiated. CAD Risk Factors and Prevention video viewed. Pt educated on Rate of Perceived Education scale. Consents signed. Biopsy Photograph Reviewed: Yes

## 2023-09-01 NOTE — DISCHARGE INSTRUCTIONS
Cardiac Rehabilitation: Care Instructions  Your Care Instructions    Cardiac rehabilitation is a program for people who have a heart problem, such as a heart attack, heart failure, or a heart valve disease. The program includes exercise, lifestyle changes, education, and emotional support. Cardiac rehab can help you improve the quality of your life through better overall health. It can help you lose weight and feel better about yourself. On your cardiac rehab team, you may have your doctor, a nurse specialist, a dietitian, and a physical therapist. They will design your cardiac rehab program specifically for you. You will learn how to reduce your risk for heart problems, how to manage stress, and how to eat a heart-healthy diet. By the end of the program, you will be ready to maintain a healthier lifestyle on your own. Follow-up care is a key part of your treatment and safety. Be sure to make and go to all appointments, and call your doctor if you are having problems. It's also a good idea to know your test results and keep a list of the medicines you take. How can you care for yourself at home? Take your medicines exactly as prescribed. Call your doctor if you think you are having a problem with your medicine. You will get more details on the specific medicines your doctor prescribes. Weigh yourself every day if your doctor tells you to. Watch for sudden weight gain. Weigh yourself on the same scale with the same amount of clothing at the same time of day. Plan your meals so that you are eating heart-healthy foods. Eat a variety of foods daily. Fresh fruits and vegetables and whole-grains are good choices. Limit your fat intake, especially saturated and trans fat. Limit salt (sodium). Increase fiber in your diet. Limit alcohol. Learn how to take your pulse so that you can track your heart rate during exercise.   Always check with your doctor before you begin a new exercise program.  Warm up before

## 2023-09-06 ENCOUNTER — HOSPITAL ENCOUNTER (OUTPATIENT)
Dept: CARDIAC REHAB | Age: 83
Setting detail: THERAPIES SERIES
Discharge: HOME | End: 2023-09-06
Payer: MEDICARE

## 2023-09-08 ENCOUNTER — CARE COORDINATION (OUTPATIENT)
Dept: CASE MANAGEMENT | Age: 83
End: 2023-09-08

## 2023-09-08 ENCOUNTER — TELEPHONE (OUTPATIENT)
Dept: FAMILY MEDICINE CLINIC | Age: 83
End: 2023-09-08

## 2023-09-08 ENCOUNTER — HOSPITAL ENCOUNTER (OUTPATIENT)
Dept: CARDIAC REHAB | Age: 83
Setting detail: THERAPIES SERIES
Discharge: HOME | End: 2023-09-08
Payer: MEDICARE

## 2023-09-08 NOTE — TELEPHONE ENCOUNTER
Patient's wife called in stating patient has been taking Paxil for past few weeks and she says he has been complaining of dizziness and last night he passed out for about a minute. Dizziness has been going on for a while now since medication. She says patient was previously on zoloft and she wants to know if he can go back on zoloft.  Please advise

## 2023-09-08 NOTE — TELEPHONE ENCOUNTER
Paxil should not make him dizzy and certainly not make him pass out. He should be taking it at night it can make him drowsy. And if he had an episode where he basically passed out he really needs to be calling his cardiologist immediately.   He also needs to be monitoring his blood sugar and know where that sugar was at at that episode

## 2023-09-08 NOTE — CARDIO/PULMONARY
Patients wife called to report that patient will not be able to make it for scheduled appointment. Wife states \" [place] got dizzy/lightheaded and passed out\"  . Wife states she was able to assist patient up.   RN advised wife/patient to be checked out in an ER and call cardiologist.
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
## 2023-09-11 ENCOUNTER — CARE COORDINATION (OUTPATIENT)
Dept: CASE MANAGEMENT | Age: 83
End: 2023-09-11

## 2023-09-11 ENCOUNTER — HOSPITAL ENCOUNTER (OUTPATIENT)
Dept: CARDIAC REHAB | Age: 83
Setting detail: THERAPIES SERIES
Discharge: HOME | End: 2023-09-11
Payer: MEDICARE

## 2023-09-11 RX ORDER — SEMAGLUTIDE 0.68 MG/ML
INJECTION, SOLUTION SUBCUTANEOUS
Qty: 3 ML | Refills: 0 | Status: SHIPPED | OUTPATIENT
Start: 2023-09-11

## 2023-09-11 NOTE — TELEPHONE ENCOUNTER
Kitty Galan is calling to request a refill on the following medication(s):    Last Visit Date (If Applicable):  6/50/9595    Next Visit Date:    Visit date not found    Medication Request:  Requested Prescriptions     Pending Prescriptions Disp Refills    OZEMPIC, 0.25 OR 0.5 MG/DOSE, 2 MG/3ML SOPN [Pharmacy Med Name: Gaviota Rodriguez 0.25-0.5 MG/DOSE(2 MG/3 ML)] 3 mL 0     Sig: DIAL AND INJECT UNDER THE SKIN 0.25 MG WEEKLY FOR 4 WEEKS THEN DIAL AND INJECT 0.5 MG WEEKLY FOR 2 WEEKS

## 2023-09-11 NOTE — CARDIO/PULMONARY
No call no show to cardiac rehab-spoke with patient's wife, states he is \"not doing well\" and sleeping a lot. He has a dr appt Juancho Talbot and will discuss cardiac rehab with  and let us know.

## 2023-09-11 NOTE — CARE COORDINATION
on symptoms and resources available to patient including: PCP  Specialist  When to call Kaitlin Rodarte. The patient agrees to contact the PCP office for questions related to their healthcare. Advance Care Planning:   not on file; education provided. Patients top risk factors for readmission: medical condition-   Interventions to address risk factors: Obtained and reviewed discharge summary and/or continuity of care documents    Offered patient enrollment in the Remote Patient Monitoring (RPM) program for in-home monitoring:  did not discuss . Care Transitions Subsequent and Final Call    Subsequent and Final Calls  Are you currently active with any services?: Outpatient/Community Services  Care Transitions Interventions                          Other Interventions:             LPN Care Coordinator provided contact information for future needs. Plan for follow-up call in 5-7 days based on severity of symptoms and risk factors. Plan for next call: symptom management-new symptoms ? Staying hydrated ? Dizziness? BS?      Casie Leon LPN

## 2023-09-13 ENCOUNTER — APPOINTMENT (OUTPATIENT)
Dept: CARDIAC REHAB | Age: 83
End: 2023-09-13
Payer: MEDICARE

## 2023-09-15 ENCOUNTER — APPOINTMENT (OUTPATIENT)
Dept: CARDIAC REHAB | Age: 83
End: 2023-09-15
Payer: MEDICARE

## 2023-09-18 ENCOUNTER — HOSPITAL ENCOUNTER (OUTPATIENT)
Dept: CARDIAC REHAB | Age: 83
Setting detail: THERAPIES SERIES
Discharge: HOME | End: 2023-09-18
Payer: MEDICARE

## 2023-09-18 ENCOUNTER — CARE COORDINATION (OUTPATIENT)
Dept: CASE MANAGEMENT | Age: 83
End: 2023-09-18

## 2023-09-18 NOTE — CARE COORDINATION
Care Transitions Outreach Attempt #1      Attempt #1 to reach patient for transitions of care follow up. Unable to reach patient. VMB full. Left message to spouse. Will re attempt contact another day. Patient: Jeana Theodore Patient : 1940 MRN: [id number]    Last Discharge [address]       Date Complaint Diagnosis Description Type Department Provider    23 Dizziness S/P CABG (coronary artery bypass graft) . .. ED to Hosp-Admission (Discharged) (ADMITTED) EMMA Larios MD; Callum Alvarado. ..             Noted following upcoming appointments from discharge chart review:   [facility] follow up appointment(s):   Future Appointments   Date Time Provider [address]   2023 10:30 AM STA [address] [place] [address]   2023 10:30 AM STA CARDIAC REHAB RM 08 [facility]   2023 10:30 AM STA CARDIAC REHAB RM 08 [facility]   2023 10:30 AM STA CARDIAC REHAB RM 08 [facility]   2023 10:30 AM STA CARDIAC REHAB RM 08 [facility]   10/2/2023 10:30 AM STA CARDIAC REHAB RM 08 ST[facility]   10/4/2023 10:30 AM STA CARDIAC REHAB RM 08 [facility]   10/6/2023 10:30 AM STA CARDIAC REHAB RM 08 [facility]   10/9/2023 10:30 AM STA CARDIAC REHAB RM 08 [facility]AZ [facility]   10/11/2023 10:30 AM STA CARDIAC REHAB RM 08 [facility]   10/13/2023 10:30 AM STA CARDIAC REHAB RM 08 [facility]   10/16/2023 10:30 AM STA CARDIAC REHAB RM 08 [facility]   10/18/2023 10:30 AM STA CARDIAC REHAB RM 08 [facility]AZ [facility]   10/20/2023 10:30 AM STA CARDIAC REHAB RM 08 [facility]   10/23/2023 10:30 AM STA CARDIAC REHAB RM 08 [facility]   10/25/2023 10:30 AM STA CARDIAC REHAB RM 08 STAZ Card Re [facility]   10/27/2023 10:30 AM STA CARDIAC REHAB RM 08 STAZ Card Re [facility]   10/30/2023 10:30 AM STA CARDIAC REHAB RM 08 STAZ Card Re [facility]   2023 10:30 AM STA CARDIAC REHAB RM 08 STAZ Card Re

## 2023-09-19 ENCOUNTER — CARE COORDINATION (OUTPATIENT)
Dept: CASE MANAGEMENT | Age: 83
End: 2023-09-19

## 2023-09-19 NOTE — CARE COORDINATION
Care Transitions Outreach Attempt # 2     Call within 2 business days of discharge: Yes   Attempted to reach patient for transitions of care follow up. Unable to reach patient. Left detailed VM with reason for call & contact information. Requested a return call. Will resolve episode if no return call. Patient: Katherine Hatch Patient : 1940 MRN: [id number]    Last Discharge [address]       Date Complaint Diagnosis Description Type Department Provider    23 Dizziness S/P CABG (coronary artery bypass graft) . .. ED to Hosp-Admission (Discharged) (ADMITTED) EMMA Hernandez MD; Jose Elias Funes Ru. .. Was this an external facility discharge?  No Discharge Facility: [facility]    Noted following upcoming appointments from discharge chart review:   [facility] follow up appointment(s):   Future Appointments   Date Time Provider [address]   2023 10:30 AM  [address]   2023 10:30 AM STA CARDIAC REHAB RM 08 [facility]   2023 10:30 AM STA CARDIAC REHAB RM 08 [facility]   2023 10:30 AM STA CARDIAC REHAB RM 08 [facility]   2023 10:30 AM STA CARDIAC REHAB RM 08 [facility]   10/2/2023 10:30 AM STA CARDIAC REHAB RM 08 [facility]AZ [facility]   10/4/2023 10:30 AM STA CARDIAC REHAB RM 08 [facility]   10/6/2023 10:30 AM STA CARDIAC REHAB RM 08 [facility]AZ [facility]   10/9/2023 10:30 AM STA CARDIAC REHAB RM 08 [facility]AZ [facility]   10/11/2023 10:30 AM STA CARDIAC REHAB RM 08 [facility]AZ [facility]   10/13/2023 10:30 AM STA CARDIAC REHAB RM 08 [facility]   10/16/2023 10:30 AM STA CARDIAC REHAB RM 08 [facility]   10/18/2023 10:30 AM STA CARDIAC REHAB RM 08 [facility]   10/20/2023 10:30 AM STA CARDIAC REHAB RM 08 [facility] Card Re [facility]   10/23/2023 10:30 AM STA CARDIAC REHAB RM 08 [facility] Card Re [facility]   10/25/2023 10:30 AM STA CARDIAC REHAB RM 08 [facility] Card Re [facility]   10/27/2023

## 2023-09-20 ENCOUNTER — HOSPITAL ENCOUNTER (OUTPATIENT)
Dept: CARDIAC REHAB | Age: 83
Setting detail: THERAPIES SERIES
Discharge: HOME | End: 2023-09-20
Payer: MEDICARE

## 2023-09-22 ENCOUNTER — HOSPITAL ENCOUNTER (OUTPATIENT)
Dept: CARDIAC REHAB | Age: 83
Setting detail: THERAPIES SERIES
Discharge: HOME | End: 2023-09-22
Payer: MEDICARE

## 2023-09-25 ENCOUNTER — HOSPITAL ENCOUNTER (OUTPATIENT)
Dept: CARDIAC REHAB | Age: 83
Setting detail: THERAPIES SERIES
Discharge: HOME | End: 2023-09-25
Payer: MEDICARE

## 2023-09-25 NOTE — CARDIO/PULMONARY
No call/no show to cardiac rehab. Pt has only been to 1 appt with no follow-up, will cancel remaining appts.

## 2023-09-27 ENCOUNTER — APPOINTMENT (OUTPATIENT)
Dept: CARDIAC REHAB | Age: 83
End: 2023-09-27
Payer: MEDICARE

## 2023-09-29 ENCOUNTER — APPOINTMENT (OUTPATIENT)
Dept: CARDIAC REHAB | Age: 83
End: 2023-09-29
Payer: MEDICARE

## 2023-10-11 RX ORDER — SEMAGLUTIDE 0.68 MG/ML
INJECTION, SOLUTION SUBCUTANEOUS
Qty: 3 ML | Refills: 0 | Status: SHIPPED | OUTPATIENT
Start: 2023-10-11

## 2023-10-11 NOTE — TELEPHONE ENCOUNTER
Supriya Smith is calling to request a refill on the following medication(s):    Medication Request:  Requested Prescriptions     Pending Prescriptions Disp Refills    OZEMPIC, 0.25 OR 0.5 MG/DOSE, 2 MG/3ML SOPN [Pharmacy Med Name: Mak Prior 0.25-0.5 MG/DOSE(2 MG/3 ML)] 3 mL 0     Sig: DIAL AND INJECT UNDER THE SKIN 0.25 MG WEEKLY FOR 4 WEEKS THEN DIAL AND INJECT 0.5 MG WEEKLY FOR 2 WEEKS       Last Visit Date (If Applicable):  2/59/6571    Next Visit Date:    Visit date not found

## 2023-11-25 ENCOUNTER — APPOINTMENT (OUTPATIENT)
Dept: GENERAL RADIOLOGY | Age: 83
End: 2023-11-25
Payer: MEDICARE

## 2023-11-25 ENCOUNTER — APPOINTMENT (OUTPATIENT)
Dept: CT IMAGING | Age: 83
End: 2023-11-25
Payer: MEDICARE

## 2023-11-25 ENCOUNTER — HOSPITAL ENCOUNTER (EMERGENCY)
Age: 83
Discharge: HOME | End: 2023-11-25
Attending: EMERGENCY MEDICINE
Payer: MEDICARE

## 2023-11-25 VITALS
WEIGHT: 178 LBS | TEMPERATURE: 98.1 F | RESPIRATION RATE: 20 BRPM | HEART RATE: 63 BPM | BODY MASS INDEX: 26.29 KG/M2 | DIASTOLIC BLOOD PRESSURE: 65 MMHG | SYSTOLIC BLOOD PRESSURE: 133 MMHG | OXYGEN SATURATION: 98 %

## 2023-11-25 DIAGNOSIS — I10 HYPERTENSION, UNSPECIFIED TYPE: Primary | ICD-10-CM

## 2023-11-25 LAB
ANION GAP SERPL CALCULATED.3IONS-SCNC: 12 MMOL/L (ref 9–17)
BASOPHILS # BLD: 0.05 K/UL (ref 0–0.2)
BASOPHILS NFR BLD: 1 % (ref 0–2)
BUN SERPL-MCNC: 29 MG/DL (ref 8–23)
BUN/CREAT SERPL: 26 (ref 9–20)
CALCIUM SERPL-MCNC: 9.6 MG/DL (ref 8.6–10.4)
CHLORIDE SERPL-SCNC: 106 MMOL/L (ref 98–107)
CO2 SERPL-SCNC: 23 MMOL/L (ref 20–31)
CREAT SERPL-MCNC: 1.1 MG/DL (ref 0.7–1.2)
EOSINOPHIL # BLD: 0.36 K/UL (ref 0–0.44)
EOSINOPHILS RELATIVE PERCENT: 6 % (ref 1–4)
ERYTHROCYTE [DISTWIDTH] IN BLOOD BY AUTOMATED COUNT: 13.6 % (ref 11.8–14.4)
GFR SERPL CREATININE-BSD FRML MDRD: >60 ML/MIN/1.73M2
GLUCOSE SERPL-MCNC: 97 MG/DL (ref 70–99)
HCT VFR BLD AUTO: 38.1 % (ref 40.7–50.3)
HGB BLD-MCNC: 12.2 G/DL (ref 13–17)
IMM GRANULOCYTES # BLD AUTO: 0.09 K/UL (ref 0–0.3)
IMM GRANULOCYTES NFR BLD: 1 %
LYMPHOCYTES NFR BLD: 2.1 K/UL (ref 1.1–3.7)
LYMPHOCYTES RELATIVE PERCENT: 32 % (ref 24–43)
MAGNESIUM SERPL-MCNC: 1.7 MG/DL (ref 1.6–2.6)
MCH RBC QN AUTO: 30.1 PG (ref 25.2–33.5)
MCHC RBC AUTO-ENTMCNC: 32 G/DL (ref 28.4–34.8)
MCV RBC AUTO: 94.1 FL (ref 82.6–102.9)
MONOCYTES NFR BLD: 0.76 K/UL (ref 0.1–1.2)
MONOCYTES NFR BLD: 12 % (ref 3–12)
NEUTROPHILS NFR BLD: 48 % (ref 36–65)
NEUTS SEG NFR BLD: 3.24 K/UL (ref 1.5–8.1)
NRBC BLD-RTO: 0 PER 100 WBC
PLATELET # BLD AUTO: 313 K/UL (ref 138–453)
PMV BLD AUTO: 9.2 FL (ref 8.1–13.5)
POTASSIUM SERPL-SCNC: 4.5 MMOL/L (ref 3.7–5.3)
RBC # BLD AUTO: 4.05 M/UL (ref 4.21–5.77)
SODIUM SERPL-SCNC: 141 MMOL/L (ref 135–144)
TROPONIN I SERPL HS-MCNC: 23 NG/L (ref 0–22)
TROPONIN I SERPL HS-MCNC: 25 NG/L (ref 0–22)
WBC OTHER # BLD: 6.6 K/UL (ref 3.5–11.3)

## 2023-11-25 PROCEDURE — 2580000003 HC RX 258: Performed by: EMERGENCY MEDICINE

## 2023-11-25 PROCEDURE — 85025 COMPLETE CBC W/AUTO DIFF WBC: CPT

## 2023-11-25 PROCEDURE — 93005 ELECTROCARDIOGRAM TRACING: CPT | Performed by: EMERGENCY MEDICINE

## 2023-11-25 PROCEDURE — 70450 CT HEAD/BRAIN W/O DYE: CPT

## 2023-11-25 PROCEDURE — 84484 ASSAY OF TROPONIN QUANT: CPT

## 2023-11-25 PROCEDURE — 6370000000 HC RX 637 (ALT 250 FOR IP): Performed by: EMERGENCY MEDICINE

## 2023-11-25 PROCEDURE — 80048 BASIC METABOLIC PNL TOTAL CA: CPT

## 2023-11-25 PROCEDURE — 70498 CT ANGIOGRAPHY NECK: CPT

## 2023-11-25 PROCEDURE — 83735 ASSAY OF MAGNESIUM: CPT

## 2023-11-25 PROCEDURE — 6360000004 HC RX CONTRAST MEDICATION: Performed by: EMERGENCY MEDICINE

## 2023-11-25 PROCEDURE — 99285 EMERGENCY DEPT VISIT HI MDM: CPT

## 2023-11-25 PROCEDURE — 71045 X-RAY EXAM CHEST 1 VIEW: CPT

## 2023-11-25 RX ORDER — CLONIDINE HYDROCHLORIDE 0.1 MG/1
0.1 TABLET ORAL ONCE
Status: COMPLETED | OUTPATIENT
Start: 2023-11-25 | End: 2023-11-25

## 2023-11-25 RX ORDER — SODIUM CHLORIDE 0.9 % (FLUSH) 0.9 %
10 SYRINGE (ML) INJECTION PRN
Status: DISCONTINUED | OUTPATIENT
Start: 2023-11-25 | End: 2023-11-25 | Stop reason: HOSPADM

## 2023-11-25 RX ORDER — 0.9 % SODIUM CHLORIDE 0.9 %
80 INTRAVENOUS SOLUTION INTRAVENOUS ONCE
Status: COMPLETED | OUTPATIENT
Start: 2023-11-25 | End: 2023-11-25

## 2023-11-25 RX ADMIN — SODIUM CHLORIDE 80 ML: 9 INJECTION, SOLUTION INTRAVENOUS at 19:43

## 2023-11-25 RX ADMIN — SODIUM CHLORIDE, PRESERVATIVE FREE 10 ML: 5 INJECTION INTRAVENOUS at 19:44

## 2023-11-25 RX ADMIN — IOPAMIDOL 75 ML: 755 INJECTION, SOLUTION INTRAVENOUS at 19:44

## 2023-11-25 RX ADMIN — CLONIDINE HYDROCHLORIDE 0.1 MG: 0.1 TABLET ORAL at 18:52
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
## 2023-11-26 LAB
EKG ATRIAL RATE: 77 BPM
EKG P AXIS: 49 DEGREES
EKG Q-T INTERVAL: 404 MS
EKG QRS DURATION: 156 MS
EKG QTC CALCULATION (BAZETT): 457 MS
EKG R AXIS: -60 DEGREES
EKG T AXIS: 50 DEGREES
EKG VENTRICULAR RATE: 77 BPM

## 2023-11-26 ASSESSMENT — ENCOUNTER SYMPTOMS
ABDOMINAL PAIN: 0
SHORTNESS OF BREATH: 0

## 2023-11-26 NOTE — ED NOTES
Pt educated on discharge paper work, medication adjustments, and to follow up with PCP      Melvi Ozuna, RN  11/25/23 0122

## 2023-11-26 NOTE — ED NOTES
Pt came into ED complaining of hypertension, dizziness, and headache. Pt states he took bp at home and it was 218/109. Pt reports he takes bp medication as prescribed. Pt has had dizziness for months, but states it was different today, and he had a pounding headache. Pt has a cardiac history, with cardiac surgery in July 2023. Pt denies any weakness, pt able to ambulate without assistance. Pt denies chest pain or sob. Pt wife at bedside. Pt on room air, vitals stable, on full cardiac monitor. Will continue to monitor.  Pt is alert and Taqueria Hyatt RN  11/25/23 2044

## 2023-11-27 ENCOUNTER — OFFICE VISIT (OUTPATIENT)
Dept: FAMILY MEDICINE CLINIC | Age: 83
End: 2023-11-27
Payer: MEDICARE

## 2023-11-27 VITALS
HEART RATE: 87 BPM | BODY MASS INDEX: 27.4 KG/M2 | OXYGEN SATURATION: 99 % | SYSTOLIC BLOOD PRESSURE: 193 MMHG | DIASTOLIC BLOOD PRESSURE: 100 MMHG | WEIGHT: 185 LBS | HEIGHT: 69 IN

## 2023-11-27 DIAGNOSIS — F41.9 ANXIETY: ICD-10-CM

## 2023-11-27 DIAGNOSIS — G44.1 OTHER VASCULAR HEADACHE: ICD-10-CM

## 2023-11-27 DIAGNOSIS — I10 PRIMARY HYPERTENSION: Primary | ICD-10-CM

## 2023-11-27 PROCEDURE — G8417 CALC BMI ABV UP PARAM F/U: HCPCS | Performed by: FAMILY MEDICINE

## 2023-11-27 PROCEDURE — 3080F DIAST BP >= 90 MM HG: CPT | Performed by: FAMILY MEDICINE

## 2023-11-27 PROCEDURE — 1123F ACP DISCUSS/DSCN MKR DOCD: CPT | Performed by: FAMILY MEDICINE

## 2023-11-27 PROCEDURE — G8427 DOCREV CUR MEDS BY ELIG CLIN: HCPCS | Performed by: FAMILY MEDICINE

## 2023-11-27 PROCEDURE — G8484 FLU IMMUNIZE NO ADMIN: HCPCS | Performed by: FAMILY MEDICINE

## 2023-11-27 PROCEDURE — 99214 OFFICE O/P EST MOD 30 MIN: CPT | Performed by: FAMILY MEDICINE

## 2023-11-27 PROCEDURE — 3077F SYST BP >= 140 MM HG: CPT | Performed by: FAMILY MEDICINE

## 2023-11-27 PROCEDURE — 1036F TOBACCO NON-USER: CPT | Performed by: FAMILY MEDICINE

## 2023-11-27 RX ORDER — AMLODIPINE BESYLATE 5 MG/1
5 TABLET ORAL DAILY
Qty: 30 TABLET | Refills: 3 | Status: SHIPPED | OUTPATIENT
Start: 2023-11-27

## 2023-11-27 RX ORDER — SEMAGLUTIDE 0.68 MG/ML
INJECTION, SOLUTION SUBCUTANEOUS
Qty: 3 ML | Refills: 0 | Status: SHIPPED | OUTPATIENT
Start: 2023-11-27

## 2023-12-04 RX ORDER — PRAVASTATIN SODIUM 80 MG/1
80 TABLET ORAL DAILY
Qty: 90 TABLET | Refills: 3 | Status: SHIPPED | OUTPATIENT
Start: 2023-12-04

## 2023-12-04 RX ORDER — LISINOPRIL 10 MG/1
TABLET ORAL
Qty: 90 TABLET | Refills: 1 | Status: SHIPPED | OUTPATIENT
Start: 2023-12-04

## 2023-12-18 NOTE — H&P
----- Message from Cathy Plaza PA-C sent at 12/18/2023 10:05 AM CST -----  Please call patient.  Platelet count is elevated at 469.  Patient should repeat CBC in about four weeks.  Has she been taking an iron supplement as indicated on current medication list?   All other labs are unremarkable.    [facility]  Office: [id number], DO, Johanna Jimenes, DO, Jeri Luong, DO, Jose Baez, DO, Jo Ann Woodard MD, Nancy Branch MD, Manuelito Victor MD, Yvon Max MD,  Ary Henry MD, Jones Stephens MD, Wander Caba, DO, Peyton Najera MD,  Glenard Duverney, MD, Margarito Alcala MD, Kami Viramontes, DO, Rossy Salazar MD,  Jilda Collet, DO, Parag Thakkar MD, Jen Hastings MD, Kimi Tucker MD, Hollis Escalante MD,  Mark Watts MD, Tammy Burgos MD, Niki Pichardo DO, Renny Sánchez MD,  Therese Claudio MD, Casandra Loomis, CNP,  Dom Etienne, CNP, Viji Beltrán, CNP, Lemuel Giron, CNP,  Judy Nash, [facility], Song Nieto, CNP, Homar Robin, CNP, Kat Munoz, CNP, Luzma Cruz, CNP, Jerzy Olvera, CNP, Marilee Conley PA-C, Herbert Peralta, SWATI, Pia Steel, CNP, Kelvin Bush, [facility]    HISTORY AND PHYSICAL EXAMINATION            Date:   7/24/2023  Patient name:  Irma Rick  Date of admission:  7/24/2023 11:43 AM  MRN:   [id number]  Account:  [de-identified]  YOB: 1940  PCP:    Korey Melara DO  Room:   2022/2022-01  Code Status:    Full Code    Chief Complaint:     Chief Complaint   Patient presents with    Chest Pain     Pt reports midsternal chest pressure with nausea. Last took zofran around 1030 this morning. Dizziness       History Obtained From:     patient, electronic medical record    History of Present Illness:     Irma Rick is a 80 y.o. Non- / non  male who presents with Chest Pain (Pt reports midsternal chest pressure with nausea. Last took zofran around 1030 this morning.) and Dizziness   and is admitted to the hospital for the management of Stable angina ([address]). [Er the patient and his family he has several complaints but his primary complaints are currently feeling a dizzy/heavy.  He states he sometime has

## 2024-01-08 RX ORDER — SEMAGLUTIDE 0.68 MG/ML
INJECTION, SOLUTION SUBCUTANEOUS
Qty: 3 ML | Refills: 0 | Status: SHIPPED | OUTPATIENT
Start: 2024-01-08

## 2024-01-25 ENCOUNTER — HOSPITAL ENCOUNTER (OUTPATIENT)
Dept: PREADMISSION TESTING | Age: 84
Discharge: HOME | End: 2024-01-25
Payer: MEDICARE

## 2024-01-25 VITALS
TEMPERATURE: 98.4 F | RESPIRATION RATE: 16 BRPM | HEIGHT: 70 IN | WEIGHT: 179.9 LBS | DIASTOLIC BLOOD PRESSURE: 64 MMHG | SYSTOLIC BLOOD PRESSURE: 120 MMHG | HEART RATE: 80 BPM | OXYGEN SATURATION: 96 % | BODY MASS INDEX: 25.75 KG/M2

## 2024-01-25 LAB
ABO + RH BLD: NORMAL
ANION GAP SERPL CALCULATED.3IONS-SCNC: 15 MMOL/L (ref 9–17)
ARM BAND NUMBER: NORMAL
BLOOD BANK SAMPLE EXPIRATION: NORMAL
BLOOD GROUP ANTIBODIES SERPL: NEGATIVE
BUN SERPL-MCNC: 31 MG/DL (ref 8–23)
BUN/CREAT SERPL: 26 (ref 9–20)
CALCIUM SERPL-MCNC: 9.3 MG/DL (ref 8.6–10.4)
CHLORIDE SERPL-SCNC: 102 MMOL/L (ref 98–107)
CO2 SERPL-SCNC: 21 MMOL/L (ref 20–31)
CREAT SERPL-MCNC: 1.2 MG/DL (ref 0.7–1.2)
ERYTHROCYTE [DISTWIDTH] IN BLOOD BY AUTOMATED COUNT: 13.5 % (ref 11.8–14.4)
EST. AVERAGE GLUCOSE BLD GHB EST-MCNC: 171 MG/DL
GFR SERPL CREATININE-BSD FRML MDRD: 60 ML/MIN/1.73M2
GLUCOSE SERPL-MCNC: 255 MG/DL (ref 70–99)
HBA1C MFR BLD: 7.6 % (ref 4–6)
HCT VFR BLD AUTO: 35.8 % (ref 40.7–50.3)
HGB BLD-MCNC: 11.8 G/DL (ref 13–17)
MCH RBC QN AUTO: 31 PG (ref 25.2–33.5)
MCHC RBC AUTO-ENTMCNC: 33 G/DL (ref 28.4–34.8)
MCV RBC AUTO: 94 FL (ref 82.6–102.9)
NRBC BLD-RTO: 0 PER 100 WBC
PLATELET # BLD AUTO: 306 K/UL (ref 138–453)
PMV BLD AUTO: 9.8 FL (ref 8.1–13.5)
POTASSIUM SERPL-SCNC: 4.4 MMOL/L (ref 3.7–5.3)
RBC # BLD AUTO: 3.81 M/UL (ref 4.21–5.77)
SODIUM SERPL-SCNC: 138 MMOL/L (ref 135–144)
WBC OTHER # BLD: 6.9 K/UL (ref 3.5–11.3)

## 2024-01-25 PROCEDURE — 80048 BASIC METABOLIC PNL TOTAL CA: CPT

## 2024-01-25 PROCEDURE — 86850 RBC ANTIBODY SCREEN: CPT

## 2024-01-25 PROCEDURE — 86900 BLOOD TYPING SEROLOGIC ABO: CPT

## 2024-01-25 PROCEDURE — 87641 MR-STAPH DNA AMP PROBE: CPT

## 2024-01-25 PROCEDURE — 86901 BLOOD TYPING SEROLOGIC RH(D): CPT

## 2024-01-25 PROCEDURE — 36415 COLL VENOUS BLD VENIPUNCTURE: CPT

## 2024-01-25 PROCEDURE — 83036 HEMOGLOBIN GLYCOSYLATED A1C: CPT

## 2024-01-25 PROCEDURE — 85027 COMPLETE CBC AUTOMATED: CPT

## 2024-01-25 RX ORDER — CELECOXIB 200 MG/1
200 CAPSULE ORAL ONCE
OUTPATIENT
Start: 2024-02-08

## 2024-01-25 RX ORDER — ACETAMINOPHEN 500 MG
1000 TABLET ORAL ONCE
OUTPATIENT
Start: 2024-02-08

## 2024-01-25 RX ORDER — GABAPENTIN 300 MG/1
300 CAPSULE ORAL ONCE
OUTPATIENT
Start: 2024-02-08

## 2024-01-25 RX ORDER — LISINOPRIL 10 MG/1
10 TABLET ORAL DAILY
COMMUNITY

## 2024-01-25 ASSESSMENT — PROMIS GLOBAL HEALTH SCALE
IN THE PAST 7 DAYS, HOW OFTEN HAVE YOU BEEN BOTHERED BY EMOTIONAL PROBLEMS, SUCH AS FEELING ANXIOUS, DEPRESSED, OR IRRITABLE [ON A SCALE FROM 1 (NEVER) TO 5 (ALWAYS)]?: 3
IN GENERAL, HOW WOULD YOU RATE YOUR PHYSICAL HEALTH [ON A SCALE OF 1 (POOR) TO 5 (EXCELLENT)]?: 5
IN GENERAL, WOULD YOU SAY YOUR QUALITY OF LIFE IS...[ON A SCALE OF 1 (POOR) TO 5 (EXCELLENT)]: 5
TO WHAT EXTENT ARE YOU ABLE TO CARRY OUT YOUR EVERYDAY PHYSICAL ACTIVITIES SUCH AS WALKING, CLIMBING STAIRS, CARRYING GROCERIES, OR MOVING A CHAIR [ON A SCALE OF 1 (NOT AT ALL) TO 5 (COMPLETELY)]?: 2
IN THE PAST 7 DAYS, HOW WOULD YOU RATE YOUR FATIGUE ON AVERAGE [ON A SCALE FROM 1 (NONE) TO 5 (VERY SEVERE)]?: 4
SUM OF RESPONSES TO QUESTIONS 2, 4, 5, & 10: 18
IN GENERAL, WOULD YOU SAY YOUR HEALTH IS...[ON A SCALE OF 1 (POOR) TO 5 (EXCELLENT)]: 4
IN GENERAL, PLEASE RATE HOW WELL YOU CARRY OUT YOUR USUAL SOCIAL ACTIVITIES (INCLUDES ACTIVITIES AT HOME, AT WORK, AND IN YOUR COMMUNITY, AND RESPONSIBILITIES AS A PARENT, CHILD, SPOUSE, EMPLOYEE, FRIEND, ETC) [ON A SCALE OF 1 (POOR) TO 5 (EXCELLENT)]?: 5
IN GENERAL, HOW WOULD YOU RATE YOUR MENTAL HEALTH, INCLUDING YOUR MOOD AND YOUR ABILITY TO THINK [ON A SCALE OF 1 (POOR) TO 5 (EXCELLENT)]?: 5
IN GENERAL, HOW WOULD YOU RATE YOUR SATISFACTION WITH YOUR SOCIAL ACTIVITIES AND RELATIONSHIPS [ON A SCALE OF 1 (POOR) TO 5 (EXCELLENT)]?: 5
IN THE PAST 7 DAYS, HOW WOULD YOU RATE YOUR PAIN ON AVERAGE [ON A SCALE FROM 0 (NO PAIN) TO 10 (WORST IMAGINABLE PAIN)]?: 5
SUM OF RESPONSES TO QUESTIONS 3, 6, 7, & 8: 16

## 2024-01-25 ASSESSMENT — KOOS JR
RISING FROM SITTING: 1
HOW SEVERE IS YOUR KNEE STIFFNESS AFTER FIRST WAKING IN MORNING: 1
KOOS JR TOTAL INTERVAL SCORE: 57.14
BENDING TO THE FLOOR TO PICK UP OBJECT: 2
STANDING UPRIGHT: 1
GOING UP OR DOWN STAIRS: 3
TWISING OR PIVOTING ON KNEE: 3
STRAIGHTENING KNEE FULLY: 1

## 2024-01-25 ASSESSMENT — PAIN DESCRIPTION - LOCATION: LOCATION: KNEE

## 2024-01-25 ASSESSMENT — PAIN DESCRIPTION - ORIENTATION: ORIENTATION: LEFT

## 2024-01-25 ASSESSMENT — PAIN SCALES - GENERAL: PAINLEVEL_OUTOF10: 10

## 2024-01-25 NOTE — H&P
History and Physical Service   [facility]    HISTORY AND PHYSICAL EXAMINATION            Date of Evaluation: 1/25/2024  Patient name:  Greg Flores  MRN:   [id number]  YOB: 1940  PCP:    Belle Renteria DO    History Obtained From:     Patient, medical records    History of Present Illness:     This is Greg rhoades 84 y.o. male who presents for a pre-admission testing appointment for an upcoming left knee total arthroplasty by Dr. Paul scheduled on 2/8/2024 at 0730 due to Osteoarthritis of left knee. The patient's chief complaint is left knee pain which has progressively worsened over the past 3 years. Pain is aggravated by walking and is minimally relieved with Tylenol, Advil. Patient clicking, grinding in the knee. Prior treatment includes injection. Denies recent falls and injuries.     History of coronary artery disease, hypertension, hyperlipidemia. Patient was admitted to the hospital 7/2023 with chest pain. Code blue was called after patient was found pulseless in the bathroom requiring CPR about 10 seconds. Patient had emergent cardiac catheterization with intra-aortic balloon pump insertion and severe multivessel coronary artery disease. Patient was transferred to [facility] and underwent CABG x3 (LIMA-LAD, saphenous veins to RCA and circumflex) on 7/26/2023. Patient had postoperative atrial fibrillation and bradycardia which required permanent pacemaker placement. Patient was discharged on 8/6/2023. Patient is on long-term anticoagulation. Patient was evaluated by cardiologist Dr. Pantoja on 12/18/2023. Patient denies shortness of breath, chest pain, palpitations, dizziness.     Sleep apnea questionnaire  1) Do you snore loudly? Yes  2) Do you often feel tired, fatigued, or sleepy? No  3) Has anyone observed you stop breathing or choking/gasping during your sleep? No  4) Do you have hypertension? Yes  5) BMI >35 kg/m2? No  6) Age > 50? Yes  7) Pt is a male?  through XII grossly intact. Strength 5/5 bilaterally.  Skin: No gross lesions, rashes, bruising, or bleeding on exposed skin area.  Extremities:  Posterior tibial pulses 2+ bilaterally. No pedal edema.  No calf tenderness with palpation.  Psych:  Normal affect.     Investigations:      Laboratory Testing:  Recent Results (from the past 24 hour(s))   CBC    Collection Time: 01/25/24  9:42 AM   Result Value Ref Range    WBC 6.9 3.5 - 11.3 k/uL    RBC 3.81 (L) 4.21 - 5.77 m/uL    Hemoglobin 11.8 (L) 13.0 - 17.0 g/dL    Hematocrit 35.8 (L) 40.7 - 50.3 %    MCV 94.0 82.6 - 102.9 fL    MCH 31.0 25.2 - 33.5 pg    MCHC 33.0 28.4 - 34.8 g/dL    RDW 13.5 11.8 - 14.4 %    Platelets 306 138 - 453 k/uL    MPV 9.8 8.1 - 13.5 fL    NRBC Automated 0.0 0.0 per 100 WBC   Basic Metabolic Panel    Collection Time: 01/25/24  9:42 AM   Result Value Ref Range    Sodium 138 135 - 144 mmol/L    Potassium 4.4 3.7 - 5.3 mmol/L    Chloride 102 98 - 107 mmol/L    CO2 21 20 - 31 mmol/L    Anion Gap 15 9 - 17 mmol/L    Glucose 255 (H) 70 - 99 mg/dL    BUN 31 (H) 8 - 23 mg/dL    Creatinine 1.2 0.7 - 1.2 mg/dL    Est, Glom Filt Rate 60 (L) >60 mL/min/1.73m2    Bun/Cre Ratio 26 (H) 9 - 20    Calcium 9.3 8.6 - 10.4 mg/dL       Recent Labs     01/25/24  0942   HGB 11.8*   HCT 35.8*   WBC 6.9   MCV 94.0      K 4.4      CO2 21   BUN 31*   CREATININE 1.2   GLUCOSE 255*       No results for input(s): \"COVID19\" in the last 720 hours.    *Please note that labs listed above are the most recent lab values available in EPIC at the time of the visit and additional labs may have been drawn or resulted since that time.    Imaging/Diagnostics:    No results found.    EKG: See Epic.    Diagnosis:      1. Osteoarthritis of left knee.    Plans:     1. Left knee total arthroplasty      AMALIA Woodson - CNP  1/25/2024  10:58 AM

## 2024-01-25 NOTE — PROGRESS NOTES
[facility] Joint Replacement Pre-surgical Assessment    Scheduled Surgery Date: 02/08/2024  Surgery Time: 0730    Surgeon: QUITA  Procedure: left Total Knee    Primary Insurance Coverage [facility] MEDICARE  Pre-op class attended YES WITH WIFE 1100.    PCP: Belle Renteria DO  Clearance received by PCP: Yes    Anticipated Discharge Plan: home  Agency (if applicable): OPPT    Significant PMH:   urgical History    Procedure Laterality Date Comment Source   APPENDECTOMY  age 18     BACK SURGERY       CARDIAC PROCEDURE N/A 07/25/2023 Coronary angiography performed by Cale Pantoja MD at [facility] CARDIAC CATH LAB    CARDIAC PROCEDURE N/A 07/25/2023 Aortic root aortogram performed by Cale Pantoja MD at [facility] CARDIAC CATH LAB    CARDIAC PROCEDURE N/A 07/25/2023 Intra-aortic balloon pump insertion performed by Cale Pantoja MD at [facility] CARDIAC CATH LAB    CAROTID ENDARTERECTOMY Right  2017    COLONOSCOPY   [facility]    CORONARY ANGIOPLASTY WITH STENT PLACEMENT   two occasions, one stent first, and then two the next time, Dr SCOTT Canseco    CORONARY ARTERY BYPASS GRAFT N/A 07/26/2023 CABG CORONARY ARTERY BYPASS X3 ON PUMP, KATHERINE performed by Josué Blackwell MD at [facility] CVOR    EP DEVICE PROCEDURE N/A 08/03/2023 m avni / ppm / rm 1012 performed by Cale Cantrell DO at [facility] CARDIAC CATH LAB    PACEMAKER INSERTION   done at [facility], does not have a card      ED Notes    ED Notes    Medical History    Diagnosis Date Comment Source   Anxiety  with the chemotherapy    CAD (coronary artery disease)  Dr Mccauley, has three stents.    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 PT HAS A FWW.    Electronically signed by: Margarita Ruiz RN on 1/25/2024 at 2:00 PM

## 2024-01-25 NOTE — PRE-PROCEDURE INSTRUCTIONS
On the Day of Your Surgery, Thursday, 2/8/24, Please Arrive At 5:45 AM     Enter the hospital through the Main Entrance, take the lobby elevators to the second floor and check in at the Surgery Registration desk.     Continue to take your home medications as you normally do up to and including the night before surgery with the exception of blood thinning medications.    Blood Thinning Medications:  Please stop prescription blood thinning medications such as Apixaban (Eliquis); Clopidogrel (Plavix); Dabigatran (Pradaxa); Prasugrel (Effient); Rivaroxaban (Xarelto); Ticagrelor (Brilinta); Warfarin (Coumadin) only as directed by your surgeon and/or the prescribing physician  Ask the doctor who prescribes the Eliquis and Aspirin about when to stop the medication for surgery    Some common examples of other medications that can thin your blood are: Aspirin, Ibuprofen (Advil, Motrin), Naproxen (Aleve), Meloxicam (Mobic), Celecoxib (Celebrex), Fish Oil, many Herbal Supplements.  These medications should usually be stopped at least 7 days prior to surgery.        Tylenol is OK to take for pain the week prior to surgery.    Failure to stop certain medications may interfere with your scheduled surgery.    If you receive instructions from your surgeon regarding what medications to stop prior to surgery, please follow those specific instructions.    If You Have Diabetes:  Do not take any of your diabetic medications, (injectables or by mouth) the morning of surgery unless otherwise instructed by the doctor who manages your diabetes. If you are taking insulin, contact the doctor the manages your diabetes for instructions about any changes to your insulin dosages the day before surgery.   Stop the Ozempic at least seven days prior to surgery     Please take the following medication(s) the day of surgery with small sips of water:              Metoprolol and Amlodipine    Please use your inhaler(s) if needed and bring your

## 2024-01-26 ENCOUNTER — TELEPHONE (OUTPATIENT)
Dept: FAMILY MEDICINE CLINIC | Age: 84
End: 2024-01-26

## 2024-01-26 LAB
MRSA, DNA, NASAL: NEGATIVE
SPECIMEN DESCRIPTION: NORMAL

## 2024-01-26 NOTE — TELEPHONE ENCOUNTER
Patient's wife called stating patient is having knee surgery 2/8/24 and needs to stop his Eliquis and wants to know if he needs to be seen before his surgery? Please Advise

## 2024-01-26 NOTE — TELEPHONE ENCOUNTER
Patient is to get cardiac clearance first and see if he is okay this soon after his heart to be allowed to be having knee surgery and the cardiologist test to decide what he wants to do in regards to the Eliquis

## 2024-01-29 NOTE — PERIOP NOTE
reviews history,pacemaker interrogation report & last cardiology note.  Reviews cardiac clearance & no interventions received for pacemaker requested by MD

## 2024-02-05 RX ORDER — SEMAGLUTIDE 0.68 MG/ML
INJECTION, SOLUTION SUBCUTANEOUS
Qty: 3 ML | Refills: 0 | Status: SHIPPED | OUTPATIENT
Start: 2024-02-05

## 2024-02-06 ENCOUNTER — CASE MANAGEMENT (OUTPATIENT)
Dept: CASE MANAGEMENT | Age: 84
End: 2024-02-06

## 2024-02-06 NOTE — PROGRESS NOTES
Pre-op phone call    Spoke with patient preop concerning:     Procedure left  Knee arthroplasty with Dr. DEVLIN  on  02/08/2024 .    DME: Patient has shower chair and walker.    DME: Patient needs walker AND HAS A FWW.    Therapy after surgery: [facility] WITH [employer]. CALLED AND VERIFIED WITH O.L. D/T WIFE SUZANNE USED [employer] WHEN SHE HAD HER TJR PAULA FERRERAWOOD. LEFT BECKY A MESSAGE TO CONFIRM.    Other concerns:NONE

## 2024-02-07 ENCOUNTER — ANESTHESIA EVENT (OUTPATIENT)
Dept: OPERATING ROOM | Age: 84
End: 2024-02-07
Payer: MEDICARE

## 2024-02-07 NOTE — PROGRESS NOTES
Orthopedic Coordinator Note    Patient s/p left total Knee replacement on 02/08/2024 WITH DR. DEVLIN.    The following appointments are currently scheduled:    Post-op with surgeon 02/21/2024 AT 1345, PT TO ARRIVE EARLY FOR XRAYS.    Physical Therapy C WITH [facility] IS ABLE TO ACCEPT.      Wheeled walker order is not entered  Face to face documentation is N/A-PT HAS A FWW.    DVT Prophylaxis: PT WILL TAKE 2.5MG ELIQIS X 12 DAYS AND THEN RESUME 5MG ELIQUIS BID FOR AFIB/CARDIAC STENT. PT TO RESUME 02/09/2024.      Any questions please contact Margarita Ruiz RN, MSN  [phone]      Electronically signed by: Margarita Ruiz RN on 2/7/2024 at 1:54 PM

## 2024-02-07 NOTE — DISCHARGE INSTR - COC
Continuity of Care Form    Patient Name: Greg Flores   :  1940  MRN:  [id number]    Admit date:  (Not on file)  Discharge date:  2024    Code Status Order: Prior   Advance Directives:     Admitting Physician:  Rosas Paul MD  PCP: Belle Renteria DO    Discharging Nurse: JAY Thornton  Discharging Hospital Unit/Room#: No information available for this encounter.  Discharging Unit Phone Number: [phone]    Emergency Contact:   Extended Emergency Contact Information  Primary Emergency Contact: Bailey Floyd  Home Phone: [phone]  Relation: Child  Preferred language: English  Secondary Emergency Contact: Saroj Floyd  Home Phone: [phone]  Relation: Son-in-Law    Past Surgical History:  Past Surgical History:   Procedure Laterality Date    APPENDECTOMY  age 18    BACK SURGERY      CARDIAC PROCEDURE N/A 2023    Coronary angiography performed by Cale Pantoja MD at [facility] CARDIAC CATH LAB    CARDIAC PROCEDURE N/A 2023    Aortic root aortogram performed by Cale Pantoja MD at [facility] CARDIAC CATH LAB    CARDIAC PROCEDURE N/A 2023    Intra-aortic balloon pump insertion performed by Cale Pantoja MD at [facility] CARDIAC CATH LAB    CAROTID ENDARTERECTOMY Right     2017    [facility]ly [facility]    CORONARY ANGIOPLASTY WITH STENT PLACEMENT      two occasions, one stent first, and then two the next time, Dr SCOTT Canseco    CORONARY ARTERY BYPASS GRAFT N/A 2023    CABG CORONARY ARTERY BYPASS X3 ON PUMP, KATHERINE performed by Josué Blackwell MD at [facility] CVOR    EP DEVICE PROCEDURE N/A 2023    m avni / ppm / rm 1012 performed by Cale Cantrell DO at [facility] CARDIAC CATH LAB    PACEMAKER INSERTION      done at [facility], does not have a card       Immunization History:   Immunization History   Administered Date(s) Administered    COVID-19, PFIZER PURPLE top, DILUTE for use, (age 12 y+), 30mcg/0.3mL 2021, 2021, 2021    Influenza 10/07/2013    Influenza Virus Vaccine  oriented and alert    IV Access:  - None    Nursing Mobility/ADLs:  Walking   Assisted  Transfer  Assisted  Bathing  Assisted  Dressing  Assisted  Toileting  Assisted  Feeding  Independent  Med Admin  Independent  Med Delivery   whole    Wound Care Documentation and Therapy:  Incision 07/26/23 Knee Left (Active)   Number of days: 196       Incision 07/26/23 Sternum (Active)   Number of days: 196       Incision 07/26/23 Pretibial Proximal;Right (Active)   Number of days: 196       Incision 08/03/23 Chest Left;Upper (Active)   Number of days: 188       Incision 08/18/23 Abdomen Lower;Medial (Active)   Number of days: 172        Elimination:  Continence:   Bowel: No  Bladder: No  Urinary Catheter: None   Colostomy/Ileostomy/Ileal Conduit: No       Date of Last BM: 2/8/2024  No intake or output data in the 24 hours ending 02/07/24 1403  No intake/output data recorded.    Safety Concerns:     At Risk for Falls    Impairments/Disabilities:      None    Nutrition Therapy:  Current Nutrition Therapy:   - Oral Diet:  General    Routes of Feeding: Oral  Liquids: No Restrictions  Daily Fluid Restriction: no  Last Modified Barium Swallow with Video (Video Swallowing Test): not done    Treatments at the Time of Hospital Discharge:   Respiratory Treatments:   Oxygen Therapy:  is not on home oxygen therapy.  Ventilator:    - No ventilator support    Rehab Therapies: Physical Therapy  Weight Bearing Status/Restrictions: No weight bearing restrictions  Other Medical Equipment (for information only, NOT a DME order):  walker  Other Treatments: LEFT TOTAL KNEE REPLACEMENT    Patient's personal belongings (please select all that are sent with patient):  Glasses    RN SIGNATURE:  Electronically signed by Hillary Angel RN on 2/8/24 at 2:37 PM EST    CASE MANAGEMENT/SOCIAL WORK SECTION    Inpatient Status Date: ***    Readmission Risk Assessment Score:  Readmission Risk              Risk of Unplanned Readmission:  0           Discharging

## 2024-02-07 NOTE — DISCHARGE INSTRUCTIONS
ANY ORTHOPEDIC QUESTIONS OR ANY OTHER CONCERNS YOU MAY CALL THE ORTHOPEDIC COORDINATOR:  RAYNE MILLER RN, MSN  [phone]  [email or web address]    DISCHARGE INSTRUCTIONS  Caring for yourself after joint replacement surgery (Total Hip and Total Knee Replacement)    Activity and Therapy  Receive physical therapy three times per week. (Pain medication one hour prior to therapy)   Perform PT exercises on own when not receiving home or outpatient PT.  Ideally exercises should be at least two times a day.  Increase level of activity and ambulation each day.  Perform deep breathing exercises daily.  Patient provides self-care when possible.  Work on Range of motion for Total knee patients.   No pillow under the knee for Total knee patients.  Elevate the surgical leg when seated.  No driving until cleared by surgeon      Diet:  Increase oral intake of fruits, fiber and water to prevent constipation.  Drink fluids frequently and take stool softeners to aid in bowel motility.  Increase protein intake/reduce high-sugar intake to help promote healing and prevent infection.    Incision Care:  Keep Aquacel or other dressing intact and remove as directed by surgeon, unless saturated, in which case, call surgeon and request instructions.  If dressing falls off, call surgeon.  Waylon Hose on in the am and off in the pm to reduce swelling.  Ice affected area four times a day, for twenty minutes.    Pain Medications and Anticoagulant  You have been place on an anticoagulant to prevent blood clots.  Take this medication exactly as prescribed.  Be alert for signs of bleeding.  Take care not to injure yourself.  You have been provided pain medicine to control your pain.  Do not take more narcotics than prescribed.  You may begin weaning from narcotics as your pain level improves by decreasing the amount or frequency of the narcotics.  You may also take plain acetaminophen as an alternate to the narcotics.   Never exceed the  recommended dosage.   Ice, rest and elevating the surgical limb also help with pain control.      When to call the Surgeon:  Increased redness, warmth, drainage, swelling or odor from incision site.  Temperature above 101 degrees.  Pain not controlled by prescribed medications.  Calf tenderness, swelling, or redness.  Shortness of breath or chest pain.  If you cannot urinate and have been consuming liquids  Any incision or surgical-related concerns.  Call surgeon with concerns PRIOR TO going to hospital.    Normal Conditions:  Swelling in the operative leg: this should reduce over time.  Bruising behind the knee and around surgical area.  Some post-operative pain.    Constipation related to pain medications/decreased mobility.  (Increase fiber & water intake.)   Slight warmth of operative leg.    Fatigue and moderate pain after therapy.    Numbness near the incision site.  Nausea - take pain medications with food.  Cut back on pain medication.    NOTE: Remember to go to follow-up orthopedic appointment with surgeon      Keep it Clean - Post-Operative Home instructions    These instructions are to help you have the best possible recovery after your surgical procedure. [facility] is here to support you. If you have questions, call [phone] Monday through Friday from 7:30AM to 8:30PM to speak to a nurse. If you need to speak to someone outside of these hours, call your physician.     Incision Do’s and Don’ts  Do wash hands before and after dressing changes or when you have had any contact with your incision. Use hand  or antibacterial soap.  Do keep your incision clean and dry. It’s OK to wash the skin around your incision with mild soap and water.  Do change your dressing as you were told.   Do notify your doctor if the dressing becomes wet or dirty.  Do use a clean washcloth every time when cleaning your incision.   Do sleep on clean linens.  Do keep pets away from incision site.  Don’t sit in a bathtub,

## 2024-02-08 ENCOUNTER — ANESTHESIA (OUTPATIENT)
Dept: OPERATING ROOM | Age: 84
End: 2024-02-08
Payer: MEDICARE

## 2024-02-08 ENCOUNTER — HOSPITAL ENCOUNTER (OUTPATIENT)
Age: 84
Discharge: HOME | End: 2024-02-08
Attending: ORTHOPAEDIC SURGERY | Admitting: ORTHOPAEDIC SURGERY
Payer: MEDICARE

## 2024-02-08 VITALS
DIASTOLIC BLOOD PRESSURE: 69 MMHG | HEIGHT: 70 IN | RESPIRATION RATE: 16 BRPM | SYSTOLIC BLOOD PRESSURE: 133 MMHG | WEIGHT: 178.8 LBS | BODY MASS INDEX: 25.6 KG/M2 | OXYGEN SATURATION: 97 % | TEMPERATURE: 97.9 F | HEART RATE: 83 BPM

## 2024-02-08 DIAGNOSIS — G89.18 ACUTE POSTOPERATIVE PAIN: Primary | ICD-10-CM

## 2024-02-08 PROBLEM — M17.12 LOCALIZED OSTEOARTHRITIS OF LEFT KNEE: Status: ACTIVE | Noted: 2024-02-08

## 2024-02-08 LAB
GLUCOSE BLD-MCNC: 133 MG/DL (ref 75–110)
GLUCOSE BLD-MCNC: 187 MG/DL (ref 75–110)

## 2024-02-08 PROCEDURE — 3700000000 HC ANESTHESIA ATTENDED CARE: Performed by: ORTHOPAEDIC SURGERY

## 2024-02-08 PROCEDURE — 3600000005 HC SURGERY LEVEL 5 BASE: Performed by: ORTHOPAEDIC SURGERY

## 2024-02-08 PROCEDURE — 7100000000 HC PACU RECOVERY - FIRST 15 MIN: Performed by: ORTHOPAEDIC SURGERY

## 2024-02-08 PROCEDURE — 6360000002 HC RX W HCPCS

## 2024-02-08 PROCEDURE — 6360000002 HC RX W HCPCS: Performed by: ORTHOPAEDIC SURGERY

## 2024-02-08 PROCEDURE — C1776 JOINT DEVICE (IMPLANTABLE): HCPCS | Performed by: ORTHOPAEDIC SURGERY

## 2024-02-08 PROCEDURE — 97530 THERAPEUTIC ACTIVITIES: CPT

## 2024-02-08 PROCEDURE — 3700000001 HC ADD 15 MINUTES (ANESTHESIA): Performed by: ORTHOPAEDIC SURGERY

## 2024-02-08 PROCEDURE — 2580000003 HC RX 258: Performed by: ANESTHESIOLOGY

## 2024-02-08 PROCEDURE — 2709999900 HC NON-CHARGEABLE SUPPLY: Performed by: ORTHOPAEDIC SURGERY

## 2024-02-08 PROCEDURE — 97535 SELF CARE MNGMENT TRAINING: CPT

## 2024-02-08 PROCEDURE — 2580000003 HC RX 258: Performed by: ORTHOPAEDIC SURGERY

## 2024-02-08 PROCEDURE — 97116 GAIT TRAINING THERAPY: CPT

## 2024-02-08 PROCEDURE — 6360000002 HC RX W HCPCS: Performed by: ANESTHESIOLOGY

## 2024-02-08 PROCEDURE — 6370000000 HC RX 637 (ALT 250 FOR IP): Performed by: ANESTHESIOLOGY

## 2024-02-08 PROCEDURE — 7100000001 HC PACU RECOVERY - ADDTL 15 MIN: Performed by: ORTHOPAEDIC SURGERY

## 2024-02-08 PROCEDURE — 2500000003 HC RX 250 WO HCPCS

## 2024-02-08 PROCEDURE — 82947 ASSAY GLUCOSE BLOOD QUANT: CPT

## 2024-02-08 PROCEDURE — 97162 PT EVAL MOD COMPLEX 30 MIN: CPT

## 2024-02-08 PROCEDURE — 3600000015 HC SURGERY LEVEL 5 ADDTL 15MIN: Performed by: ORTHOPAEDIC SURGERY

## 2024-02-08 PROCEDURE — C1713 ANCHOR/SCREW BN/BN,TIS/BN: HCPCS | Performed by: ORTHOPAEDIC SURGERY

## 2024-02-08 PROCEDURE — 6370000000 HC RX 637 (ALT 250 FOR IP): Performed by: ORTHOPAEDIC SURGERY

## 2024-02-08 PROCEDURE — 97166 OT EVAL MOD COMPLEX 45 MIN: CPT

## 2024-02-08 DEVICE — COMPONENT PAT DIA32MM THK8.5MM KNEE POLY CEM CONVENTIONAL: Type: IMPLANTABLE DEVICE | Site: KNEE | Status: FUNCTIONAL

## 2024-02-08 DEVICE — PSN MC VE ASF L 10MM 8-11 GH: Type: IMPLANTABLE DEVICE | Site: KNEE | Status: FUNCTIONAL

## 2024-02-08 DEVICE — DUP USE 339820 IMPL KNEE PSN TIB STM 5 DEG SZ G L: Type: IMPLANTABLE DEVICE | Site: KNEE | Status: FUNCTIONAL

## 2024-02-08 DEVICE — PSN FEM CR CMT CCR STD SZ10 L: Type: IMPLANTABLE DEVICE | Site: KNEE | Status: FUNCTIONAL

## 2024-02-08 DEVICE — CEMENT BNE 40GM HI VISC RADPQ FOR REV SURG: Type: IMPLANTABLE DEVICE | Site: KNEE | Status: FUNCTIONAL

## 2024-02-08 RX ORDER — SODIUM CHLORIDE 9 MG/ML
INJECTION, SOLUTION INTRAVENOUS PRN
Status: DISCONTINUED | OUTPATIENT
Start: 2024-02-08 | End: 2024-02-08 | Stop reason: HOSPADM

## 2024-02-08 RX ORDER — DEXTROSE MONOHYDRATE 100 MG/ML
INJECTION, SOLUTION INTRAVENOUS CONTINUOUS PRN
Status: DISCONTINUED | OUTPATIENT
Start: 2024-02-08 | End: 2024-02-08 | Stop reason: HOSPADM

## 2024-02-08 RX ORDER — VANCOMYCIN HYDROCHLORIDE 1 G/20ML
INJECTION, POWDER, LYOPHILIZED, FOR SOLUTION INTRAVENOUS
Status: DISCONTINUED
Start: 2024-02-08 | End: 2024-02-08

## 2024-02-08 RX ORDER — VANCOMYCIN HYDROCHLORIDE 1 G/20ML
INJECTION, POWDER, LYOPHILIZED, FOR SOLUTION INTRAVENOUS PRN
Status: DISCONTINUED | OUTPATIENT
Start: 2024-02-08 | End: 2024-02-08 | Stop reason: ALTCHOICE

## 2024-02-08 RX ORDER — CELECOXIB 200 MG/1
200 CAPSULE ORAL ONCE
Status: COMPLETED | OUTPATIENT
Start: 2024-02-08 | End: 2024-02-08

## 2024-02-08 RX ORDER — TRANEXAMIC ACID 100 MG/ML
INJECTION, SOLUTION INTRAVENOUS PRN
Status: DISCONTINUED | OUTPATIENT
Start: 2024-02-08 | End: 2024-02-08 | Stop reason: SDUPTHER

## 2024-02-08 RX ORDER — OXYCODONE HYDROCHLORIDE 5 MG/1
5 TABLET ORAL EVERY 4 HOURS PRN
Status: DISCONTINUED | OUTPATIENT
Start: 2024-02-08 | End: 2024-02-08 | Stop reason: HOSPADM

## 2024-02-08 RX ORDER — AMLODIPINE BESYLATE 5 MG/1
5 TABLET ORAL DAILY
Status: DISCONTINUED | OUTPATIENT
Start: 2024-02-08 | End: 2024-02-08 | Stop reason: HOSPADM

## 2024-02-08 RX ORDER — GLUCAGON 1 MG/ML
1 KIT INJECTION PRN
Status: DISCONTINUED | OUTPATIENT
Start: 2024-02-08 | End: 2024-02-08 | Stop reason: HOSPADM

## 2024-02-08 RX ORDER — ONDANSETRON 2 MG/ML
4 INJECTION INTRAMUSCULAR; INTRAVENOUS
Status: DISCONTINUED | OUTPATIENT
Start: 2024-02-08 | End: 2024-02-08 | Stop reason: HOSPADM

## 2024-02-08 RX ORDER — ONDANSETRON 4 MG/1
4 TABLET, ORALLY DISINTEGRATING ORAL EVERY 8 HOURS PRN
Status: DISCONTINUED | OUTPATIENT
Start: 2024-02-08 | End: 2024-02-08 | Stop reason: HOSPADM

## 2024-02-08 RX ORDER — OXYCODONE HYDROCHLORIDE 5 MG/1
10 TABLET ORAL EVERY 4 HOURS PRN
Status: DISCONTINUED | OUTPATIENT
Start: 2024-02-08 | End: 2024-02-08 | Stop reason: HOSPADM

## 2024-02-08 RX ORDER — ACETAMINOPHEN 325 MG/1
650 TABLET ORAL EVERY 6 HOURS
Status: DISCONTINUED | OUTPATIENT
Start: 2024-02-08 | End: 2024-02-08 | Stop reason: HOSPADM

## 2024-02-08 RX ORDER — SODIUM CHLORIDE 0.9 % (FLUSH) 0.9 %
5-40 SYRINGE (ML) INJECTION PRN
Status: DISCONTINUED | OUTPATIENT
Start: 2024-02-08 | End: 2024-02-08 | Stop reason: HOSPADM

## 2024-02-08 RX ORDER — ROCURONIUM BROMIDE 10 MG/ML
INJECTION, SOLUTION INTRAVENOUS PRN
Status: DISCONTINUED | OUTPATIENT
Start: 2024-02-08 | End: 2024-02-08 | Stop reason: SDUPTHER

## 2024-02-08 RX ORDER — GABAPENTIN 300 MG/1
300 CAPSULE ORAL ONCE
Status: COMPLETED | OUTPATIENT
Start: 2024-02-08 | End: 2024-02-08

## 2024-02-08 RX ORDER — MAGNESIUM HYDROXIDE 1200 MG/15ML
LIQUID ORAL CONTINUOUS PRN
Status: COMPLETED | OUTPATIENT
Start: 2024-02-08 | End: 2024-02-08

## 2024-02-08 RX ORDER — TRANEXAMIC ACID 100 MG/ML
INJECTION, SOLUTION INTRAVENOUS
Status: COMPLETED
Start: 2024-02-08 | End: 2024-02-08

## 2024-02-08 RX ORDER — SODIUM CHLORIDE, SODIUM LACTATE, POTASSIUM CHLORIDE, CALCIUM CHLORIDE 600; 310; 30; 20 MG/100ML; MG/100ML; MG/100ML; MG/100ML
INJECTION, SOLUTION INTRAVENOUS CONTINUOUS
Status: DISCONTINUED | OUTPATIENT
Start: 2024-02-08 | End: 2024-02-08

## 2024-02-08 RX ORDER — SODIUM CHLORIDE 9 MG/ML
INJECTION, SOLUTION INTRAVENOUS CONTINUOUS
Status: DISCONTINUED | OUTPATIENT
Start: 2024-02-08 | End: 2024-02-08 | Stop reason: HOSPADM

## 2024-02-08 RX ORDER — TAMSULOSIN HYDROCHLORIDE 0.4 MG/1
0.4 CAPSULE ORAL DAILY
Status: DISCONTINUED | OUTPATIENT
Start: 2024-02-08 | End: 2024-02-08 | Stop reason: HOSPADM

## 2024-02-08 RX ORDER — PRAVASTATIN SODIUM 40 MG
80 TABLET ORAL DAILY
Status: DISCONTINUED | OUTPATIENT
Start: 2024-02-08 | End: 2024-02-08 | Stop reason: HOSPADM

## 2024-02-08 RX ORDER — KETOROLAC TROMETHAMINE 15 MG/ML
15 INJECTION, SOLUTION INTRAMUSCULAR; INTRAVENOUS EVERY 6 HOURS
Status: DISCONTINUED | OUTPATIENT
Start: 2024-02-08 | End: 2024-02-08 | Stop reason: HOSPADM

## 2024-02-08 RX ORDER — LIDOCAINE HYDROCHLORIDE 20 MG/ML
INJECTION, SOLUTION EPIDURAL; INFILTRATION; INTRACAUDAL; PERINEURAL PRN
Status: DISCONTINUED | OUTPATIENT
Start: 2024-02-08 | End: 2024-02-08 | Stop reason: SDUPTHER

## 2024-02-08 RX ORDER — ONDANSETRON 2 MG/ML
INJECTION INTRAMUSCULAR; INTRAVENOUS PRN
Status: DISCONTINUED | OUTPATIENT
Start: 2024-02-08 | End: 2024-02-08 | Stop reason: SDUPTHER

## 2024-02-08 RX ORDER — LIDOCAINE HYDROCHLORIDE 10 MG/ML
1 INJECTION, SOLUTION EPIDURAL; INFILTRATION; INTRACAUDAL; PERINEURAL
Status: DISCONTINUED | OUTPATIENT
Start: 2024-02-09 | End: 2024-02-08 | Stop reason: HOSPADM

## 2024-02-08 RX ORDER — PHENYLEPHRINE HCL IN 0.9% NACL 1 MG/10 ML
SYRINGE (ML) INTRAVENOUS PRN
Status: DISCONTINUED | OUTPATIENT
Start: 2024-02-08 | End: 2024-02-08 | Stop reason: SDUPTHER

## 2024-02-08 RX ORDER — CYCLOBENZAPRINE HCL 10 MG
10 TABLET ORAL EVERY 12 HOURS PRN
Status: DISCONTINUED | OUTPATIENT
Start: 2024-02-08 | End: 2024-02-08 | Stop reason: HOSPADM

## 2024-02-08 RX ORDER — DOCUSATE SODIUM 100 MG/1
100 CAPSULE, LIQUID FILLED ORAL 2 TIMES DAILY
Qty: 30 CAPSULE | Refills: 0 | Status: SHIPPED | OUTPATIENT
Start: 2024-02-08

## 2024-02-08 RX ORDER — LISINOPRIL 10 MG/1
10 TABLET ORAL DAILY
Status: DISCONTINUED | OUTPATIENT
Start: 2024-02-08 | End: 2024-02-08 | Stop reason: HOSPADM

## 2024-02-08 RX ORDER — ONDANSETRON 2 MG/ML
4 INJECTION INTRAMUSCULAR; INTRAVENOUS EVERY 6 HOURS PRN
Status: DISCONTINUED | OUTPATIENT
Start: 2024-02-08 | End: 2024-02-08 | Stop reason: HOSPADM

## 2024-02-08 RX ORDER — BISACODYL 5 MG/1
5 TABLET, DELAYED RELEASE ORAL DAILY
Status: DISCONTINUED | OUTPATIENT
Start: 2024-02-08 | End: 2024-02-08 | Stop reason: HOSPADM

## 2024-02-08 RX ORDER — ONDANSETRON 4 MG/1
4 TABLET, FILM COATED ORAL EVERY 6 HOURS PRN
Qty: 30 TABLET | Refills: 1 | Status: SHIPPED | OUTPATIENT
Start: 2024-02-08

## 2024-02-08 RX ORDER — HYDROXYZINE HYDROCHLORIDE 10 MG/1
10 TABLET, FILM COATED ORAL EVERY 8 HOURS PRN
Status: DISCONTINUED | OUTPATIENT
Start: 2024-02-08 | End: 2024-02-08 | Stop reason: HOSPADM

## 2024-02-08 RX ORDER — PROPOFOL 10 MG/ML
INJECTION, EMULSION INTRAVENOUS PRN
Status: DISCONTINUED | OUTPATIENT
Start: 2024-02-08 | End: 2024-02-08 | Stop reason: SDUPTHER

## 2024-02-08 RX ORDER — SODIUM CHLORIDE 0.9 % (FLUSH) 0.9 %
5-40 SYRINGE (ML) INJECTION EVERY 12 HOURS SCHEDULED
Status: DISCONTINUED | OUTPATIENT
Start: 2024-02-08 | End: 2024-02-08 | Stop reason: HOSPADM

## 2024-02-08 RX ORDER — OXYCODONE HYDROCHLORIDE AND ACETAMINOPHEN 5; 325 MG/1; MG/1
1-2 TABLET ORAL EVERY 4 HOURS PRN
Qty: 50 TABLET | Refills: 0 | Status: SHIPPED | OUTPATIENT
Start: 2024-02-08 | End: 2024-02-15

## 2024-02-08 RX ORDER — PAROXETINE HYDROCHLORIDE 20 MG/1
20 TABLET, FILM COATED ORAL DAILY
Status: DISCONTINUED | OUTPATIENT
Start: 2024-02-08 | End: 2024-02-08 | Stop reason: HOSPADM

## 2024-02-08 RX ORDER — FENTANYL CITRATE 50 UG/ML
INJECTION, SOLUTION INTRAMUSCULAR; INTRAVENOUS PRN
Status: DISCONTINUED | OUTPATIENT
Start: 2024-02-08 | End: 2024-02-08 | Stop reason: SDUPTHER

## 2024-02-08 RX ORDER — GLIPIZIDE 5 MG/1
5 TABLET ORAL 2 TIMES DAILY
Status: DISCONTINUED | OUTPATIENT
Start: 2024-02-08 | End: 2024-02-08 | Stop reason: HOSPADM

## 2024-02-08 RX ORDER — FENTANYL CITRATE 50 UG/ML
25 INJECTION, SOLUTION INTRAMUSCULAR; INTRAVENOUS EVERY 5 MIN PRN
Status: DISCONTINUED | OUTPATIENT
Start: 2024-02-08 | End: 2024-02-08 | Stop reason: HOSPADM

## 2024-02-08 RX ORDER — ACETAMINOPHEN 500 MG
1000 TABLET ORAL ONCE
Status: COMPLETED | OUTPATIENT
Start: 2024-02-08 | End: 2024-02-08

## 2024-02-08 RX ORDER — SODIUM CHLORIDE 9 MG/ML
INJECTION, SOLUTION INTRAVENOUS CONTINUOUS
Status: DISCONTINUED | OUTPATIENT
Start: 2024-02-08 | End: 2024-02-08

## 2024-02-08 RX ORDER — HYDROMORPHONE HYDROCHLORIDE 1 MG/ML
0.5 INJECTION, SOLUTION INTRAMUSCULAR; INTRAVENOUS; SUBCUTANEOUS EVERY 5 MIN PRN
Status: DISCONTINUED | OUTPATIENT
Start: 2024-02-08 | End: 2024-02-08 | Stop reason: HOSPADM

## 2024-02-08 RX ORDER — DEXAMETHASONE SODIUM PHOSPHATE 10 MG/ML
INJECTION, SOLUTION INTRAMUSCULAR; INTRAVENOUS PRN
Status: DISCONTINUED | OUTPATIENT
Start: 2024-02-08 | End: 2024-02-08 | Stop reason: SDUPTHER

## 2024-02-08 RX ADMIN — BISACODYL 5 MG: 5 TABLET, COATED ORAL at 11:03

## 2024-02-08 RX ADMIN — ONDANSETRON 4 MG: 2 INJECTION INTRAMUSCULAR; INTRAVENOUS at 08:46

## 2024-02-08 RX ADMIN — FENTANYL CITRATE 50 MCG: 50 INJECTION INTRAMUSCULAR; INTRAVENOUS at 07:52

## 2024-02-08 RX ADMIN — TAMSULOSIN HYDROCHLORIDE 0.4 MG: 0.4 CAPSULE ORAL at 11:03

## 2024-02-08 RX ADMIN — METOPROLOL TARTRATE 25 MG: 25 TABLET, FILM COATED ORAL at 11:02

## 2024-02-08 RX ADMIN — Medication 25 MCG: at 09:12

## 2024-02-08 RX ADMIN — SODIUM CHLORIDE, PRESERVATIVE FREE 10 ML: 5 INJECTION INTRAVENOUS at 11:35

## 2024-02-08 RX ADMIN — FENTANYL CITRATE 25 MCG: 50 INJECTION INTRAMUSCULAR; INTRAVENOUS at 08:54

## 2024-02-08 RX ADMIN — ROCURONIUM BROMIDE 50 MG: 50 INJECTION, SOLUTION INTRAVENOUS at 07:24

## 2024-02-08 RX ADMIN — DEXAMETHASONE SODIUM PHOSPHATE 10 MG: 10 INJECTION INTRAMUSCULAR; INTRAVENOUS at 07:30

## 2024-02-08 RX ADMIN — HYDROMORPHONE HYDROCHLORIDE 0.5 MG: 1 INJECTION, SOLUTION INTRAMUSCULAR; INTRAVENOUS; SUBCUTANEOUS at 09:48

## 2024-02-08 RX ADMIN — KETOROLAC TROMETHAMINE 15 MG: 15 INJECTION, SOLUTION INTRAMUSCULAR; INTRAVENOUS at 11:29

## 2024-02-08 RX ADMIN — SODIUM CHLORIDE, POTASSIUM CHLORIDE, SODIUM LACTATE AND CALCIUM CHLORIDE: 600; 310; 30; 20 INJECTION, SOLUTION INTRAVENOUS at 06:42

## 2024-02-08 RX ADMIN — ACETAMINOPHEN 650 MG: 325 TABLET ORAL at 13:08

## 2024-02-08 RX ADMIN — Medication 2000 MG: at 07:33

## 2024-02-08 RX ADMIN — HYDROMORPHONE HYDROCHLORIDE 0.5 MG: 1 INJECTION, SOLUTION INTRAMUSCULAR; INTRAVENOUS; SUBCUTANEOUS at 09:37

## 2024-02-08 RX ADMIN — LIDOCAINE HYDROCHLORIDE 80 MG: 20 INJECTION, SOLUTION EPIDURAL; INFILTRATION; INTRACAUDAL; PERINEURAL at 07:24

## 2024-02-08 RX ADMIN — METFORMIN HYDROCHLORIDE 1000 MG: 500 TABLET ORAL at 11:02

## 2024-02-08 RX ADMIN — FENTANYL CITRATE 25 MCG: 50 INJECTION INTRAMUSCULAR; INTRAVENOUS at 09:19

## 2024-02-08 RX ADMIN — PROPOFOL 25 MG: 10 INJECTION, EMULSION INTRAVENOUS at 08:52

## 2024-02-08 RX ADMIN — PROPOFOL 150 MG: 10 INJECTION, EMULSION INTRAVENOUS at 07:24

## 2024-02-08 RX ADMIN — SODIUM CHLORIDE: 9 INJECTION, SOLUTION INTRAVENOUS at 11:08

## 2024-02-08 RX ADMIN — TRANEXAMIC ACID 1200 MG: 100 INJECTION, SOLUTION INTRAVENOUS at 07:35

## 2024-02-08 RX ADMIN — OXYCODONE HYDROCHLORIDE 5 MG: 5 TABLET ORAL at 10:15

## 2024-02-08 RX ADMIN — Medication 50 MCG: at 08:20

## 2024-02-08 RX ADMIN — SUGAMMADEX 200 MG: 100 INJECTION, SOLUTION INTRAVENOUS at 09:15

## 2024-02-08 RX ADMIN — Medication 50 MCG: at 08:04

## 2024-02-08 RX ADMIN — Medication 25 MCG: at 08:52

## 2024-02-08 RX ADMIN — CELECOXIB 200 MG: 200 CAPSULE ORAL at 07:10

## 2024-02-08 RX ADMIN — AMLODIPINE BESYLATE 5 MG: 5 TABLET ORAL at 11:02

## 2024-02-08 RX ADMIN — GABAPENTIN 300 MG: 300 CAPSULE ORAL at 07:10

## 2024-02-08 RX ADMIN — GLIPIZIDE 5 MG: 5 TABLET ORAL at 11:03

## 2024-02-08 RX ADMIN — FENTANYL CITRATE 50 MCG: 50 INJECTION INTRAMUSCULAR; INTRAVENOUS at 07:24

## 2024-02-08 RX ADMIN — LISINOPRIL 10 MG: 10 TABLET ORAL at 11:02

## 2024-02-08 RX ADMIN — ACETAMINOPHEN 1000 MG: 500 TABLET ORAL at 07:10

## 2024-02-08 ASSESSMENT — PAIN DESCRIPTION - ORIENTATION
ORIENTATION: LEFT

## 2024-02-08 ASSESSMENT — PAIN DESCRIPTION - DESCRIPTORS
DESCRIPTORS: ACHING
DESCRIPTORS: ACHING
DESCRIPTORS: SORE

## 2024-02-08 ASSESSMENT — PAIN - FUNCTIONAL ASSESSMENT
PAIN_FUNCTIONAL_ASSESSMENT: PREVENTS OR INTERFERES SOME ACTIVE ACTIVITIES AND ADLS
PAIN_FUNCTIONAL_ASSESSMENT: 0-10
PAIN_FUNCTIONAL_ASSESSMENT: PREVENTS OR INTERFERES SOME ACTIVE ACTIVITIES AND ADLS
PAIN_FUNCTIONAL_ASSESSMENT: FACE, LEGS, ACTIVITY, CRY, AND CONSOLABILITY (FLACC)

## 2024-02-08 ASSESSMENT — PAIN SCALES - GENERAL
PAINLEVEL_OUTOF10: 7
PAINLEVEL_OUTOF10: 1
PAINLEVEL_OUTOF10: 7
PAINLEVEL_OUTOF10: 10
PAINLEVEL_OUTOF10: 4

## 2024-02-08 ASSESSMENT — PAIN DESCRIPTION - PAIN TYPE
TYPE: SURGICAL PAIN

## 2024-02-08 ASSESSMENT — PAIN DESCRIPTION - FREQUENCY
FREQUENCY: CONTINUOUS

## 2024-02-08 ASSESSMENT — PAIN DESCRIPTION - LOCATION
LOCATION: KNEE

## 2024-02-08 ASSESSMENT — PAIN DESCRIPTION - ONSET
ONSET: ON-GOING
ONSET: ON-GOING

## 2024-02-08 NOTE — PROGRESS NOTES
Pt admitted to room #2107 from PACU  Oriented to room and call light/tv controls.  Bed in lowest position, wheels locked, 2/4 side rails up  Call light in reach, room free of clutter, adequate lighting provided.

## 2024-02-08 NOTE — H&P
tablet by mouth daily 11/27/23   Belle Renteria DO   metoprolol tartrate (LOPRESSOR) 25 MG tablet Take 1 tablet by mouth 2 times daily 9/18/23   Gladys Ortiz APRN - CNP   PARoxetine (PAXIL) 20 MG tablet Take 1 tablet by mouth daily 8/15/23   Belle Renteria DO   furosemide (LASIX) 20 MG tablet Take 1 tablet by mouth daily  Patient not taking: Reported on 8/21/2023 8/6/23   Herminio Giles APRN - NP   tamsulosin (FLOMAX) 0.4 MG capsule Take 1 capsule by mouth daily 8/7/23   Herminio Giles APRN - NP   melatonin 5 MG TABS tablet Take 1 tablet by mouth nightly  Patient taking differently: Take 2 tablets by mouth nightly 8/6/23   Herminio Giles APRN - NP   glipiZIDE (GLUCOTROL) 5 MG tablet TAKE 1 TABLET BY MOUTH TWICE A DAY  Patient not taking: Reported on 9/18/2023 7/17/23   Belle Renteria DO   diclofenac sodium (VOLTAREN) 1 % GEL Apply 4 g topically 4 times daily 6/5/23   Belle Renteria DO   metFORMIN (GLUCOPHAGE) 1000 MG tablet Take 1 tablet by mouth 2 times daily (with meals)  Patient taking differently: Take 1 tablet by mouth daily (with breakfast) 6/5/23   Belle Renteria DO   allopurinol (ZYLOPRIM) 300 MG tablet TAKE 1 TABLET BY MOUTH DAILY  Patient not taking: Reported on 12/18/2023 11/14/14   Cesar Deng DO   aspirin 81 MG EC tablet Take 1 tablet by mouth daily    Provider, MD Fady         This is a 84 y.o. male who is pleasant, cooperative, alert and oriented x3, in no acute distress.    Heart: Heart sounds are normal.  HR 91 regular rate and rhythm without murmur, gallop or rub.   Lungs: Normal respiratory effort with equal expansion, good air exchange, unlabored and clear to auscultation without wheezes or rales bilaterally   Abdomen: soft, nontender, nondistended with bowel sounds .   Extremities no calf tenderness or edema pedal pulses palpable    Labs:  Recent Labs     01/25/24  0942   HGB 11.8*   HCT 35.8*   WBC 6.9   MCV 94.0         K 4.4      CO2  carotid bruits noted.  Lungs:  Bilateral equal air entry, clear to auscultation, no wheezing, rales or rhonchi, and normal effort.  Cardiovascular: Left upper chest permanent pacemaker. Normal rate, regular rhythm, no murmur, gallop, or rub.  Abdomen: Incisional hernia noted mid-upper epigastric overlying open heart surgical incision - hernia soft, non-tender. Soft, non-tender, non-distended, and active bowel sounds.  Neurologic:  Normal speech and cranial nerves II through XII grossly intact. Strength 5/5 bilaterally.  Skin: No gross lesions, rashes, bruising, or bleeding on exposed skin area.  Extremities:  Posterior tibial pulses 2+ bilaterally. No pedal edema.  No calf tenderness with palpation.  Psych:  Normal affect.      Investigations:       Laboratory Testing:  Recent Results         Recent Results (from the past 24 hour(s))   CBC     Collection Time: 01/25/24  9:42 AM   Result Value Ref Range     WBC 6.9 3.5 - 11.3 k/uL     RBC 3.81 (L) 4.21 - 5.77 m/uL     Hemoglobin 11.8 (L) 13.0 - 17.0 g/dL     Hematocrit 35.8 (L) 40.7 - 50.3 %     MCV 94.0 82.6 - 102.9 fL     MCH 31.0 25.2 - 33.5 pg     MCHC 33.0 28.4 - 34.8 g/dL     RDW 13.5 11.8 - 14.4 %     Platelets 306 138 - 453 k/uL     MPV 9.8 8.1 - 13.5 fL     NRBC Automated 0.0 0.0 per 100 WBC   Basic Metabolic Panel     Collection Time: 01/25/24  9:42 AM   Result Value Ref Range     Sodium 138 135 - 144 mmol/L     Potassium 4.4 3.7 - 5.3 mmol/L     Chloride 102 98 - 107 mmol/L     CO2 21 20 - 31 mmol/L     Anion Gap 15 9 - 17 mmol/L     Glucose 255 (H) 70 - 99 mg/dL     BUN 31 (H) 8 - 23 mg/dL     Creatinine 1.2 0.7 - 1.2 mg/dL     Est, Glom Filt Rate 60 (L) >60 mL/min/1.73m2     Bun/Cre Ratio 26 (H) 9 - 20     Calcium 9.3 8.6 - 10.4 mg/dL                Recent Labs     01/25/24  0942   HGB 11.8*   HCT 35.8*   WBC 6.9   MCV 94.0      K 4.4      CO2 21   BUN 31*   CREATININE 1.2   GLUCOSE 255*         No results for input(s): \"COVID19\" in the last

## 2024-02-08 NOTE — PROGRESS NOTES
Physical Therapy  Facility/Department: [facility] MED SURG  Physical Therapy Initial Assessment - Day of Surgery     Name: Greg Flores  : 1940  MRN: [id number]  Date of Service: 2024  JAY Thornton reports patient is medically stable for therapy treatment this date.    Chart reviewed prior to treatment and patient is agreeable for therapy.  All lines intact and patient positioned comfortably at end of treatment.  All patient needs addressed prior to ending therapy session.      Discharge Recommendations:  Patient would benefit from continued therapy after discharge   Pt presenting with new musculoskeletal dysfunction and would benefit from additional therapy at time of discharge.  Please refer to the AM-PAC score for current functional status.     PT Equipment Recommendations  Equipment Needed: No (Pt reports owning RW (pt had brought in walker to hospital but had 4 wheels, discussed importance of having correct RW to use post-op and pt's family stating there is a RW at home))    H&P / Procedure: L TKA 2024, Dr. Paul       Patient Diagnosis(es): The encounter diagnosis was Acute postoperative pain.  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  Past Surgical History:  has a past surgical history that includes Appendectomy (age 18); Coronary angioplasty with stent; Colonoscopy; back surgery; Carotid endarterectomy (Right); Coronary artery bypass graft (N/A, 2023); ep device procedure (N/A, 2023); Cardiac procedure (N/A, 2023); Cardiac procedure (N/A, 2023); Cardiac procedure (N/A, 2023); and Pacemaker insertion.    Assessment   Body Structures, Functions, Activity Limitations  Requiring Skilled Therapeutic Intervention: Decreased functional mobility ;Decreased ADL status;Decreased ROM;Decreased strength;Decreased safe awareness;Decreased endurance;Decreased balance;Decreased high-level IADLs;Increased pain  Assessment: Pt tolerated PT eval well s/p L TKA.  Pt demonstrating fair steadiness throughout ambulation & stair negotiation.  Pt presenting w/ expected ROM, strength, balance, gait, and endurance deficits post-op and would benefit from continued skilled PT to address deficits in order to maximize independence w/ functional mobility and return to PLOF as able.  Therapy Prognosis: Excellent  Decision Making: Medium Complexity  Requires PT Follow-Up: Yes  Activity Tolerance  Activity Tolerance: Patient tolerated evaluation without incident     Plan   Physical Therapy Plan  General Plan: 2 times a day 7 days a week  Current Treatment Recommendations: Strengthening, ROM, Balance training, Functional mobility training, Gait training, Transfer training, Stair training, Neuromuscular re-education, Home exercise program, Endurance training, Pain management, Safety education & training, Patient/Caregiver education & training, Equipment evaluation, education, & procurement, Therapeutic activities  Safety Devices  Type of Devices: Bed alarm in place, Call light within reach, Left in bed, Gait belt, Nurse notified  Restraints  Restraints Initially in Place: No     Restrictions  Restrictions/Precautions  Restrictions/Precautions: General Precautions, Fall Risk, Weight Bearing  Required Braces or Orthoses?: No  Lower Extremity Weight Bearing Restrictions  Left Lower Extremity Weight Bearing: Weight Bearing As Tolerated  Position Activity Restriction  Other position/activity restrictions: Up w/ assist, TIMOTHY Scott IV     Subjective   General  Patient assessed for rehabilitation services?: Yes  Response To Previous Treatment: Not applicable  Family / Caregiver Present: Yes (Pt's spouse, daughter,

## 2024-02-08 NOTE — PLAN OF CARE
Problem: Chronic Conditions and Co-morbidities  Goal: Patient's chronic conditions and co-morbidity symptoms are monitored and maintained or improved  Outcome: Progressing  Flowsheets (Taken 2/8/2024 1050)  Care Plan - Patient's Chronic Conditions and Co-Morbidity Symptoms are Monitored and Maintained or Improved: Monitor and assess patient's chronic conditions and comorbid symptoms for stability, deterioration, or improvement     Problem: Discharge Planning  Goal: Discharge to home or other facility with appropriate resources  Outcome: Progressing  Flowsheets (Taken 2/8/2024 1050)  Discharge to home or other facility with appropriate resources:   Arrange for needed discharge resources and transportation as appropriate   Identify barriers to discharge with patient and caregiver   Identify discharge learning needs (meds, wound care, etc)   Refer to discharge planning if patient needs post-hospital services based on physician order or complex needs related to functional status, cognitive ability or social support system     Problem: Pain  Goal: Verbalizes/displays adequate comfort level or baseline comfort level  Outcome: Progressing

## 2024-02-08 NOTE — PROGRESS NOTES
the past year or any fall with injury in the past year?: No  ADL Assistance: Independent  Homemaking Assistance: Independent  Homemaking Responsibilities: Yes  Ambulation Assistance: Independent  Transfer Assistance: Independent  Active : Yes  Occupation: Retired  Type of Occupation: owned restaurant       Objective   Observation/Palpation  Posture: Fair  Observation: Unable to visualize L TKA surgical dressing - LLE ace wrapped; R CAITIE hose on prior to writer's arrival  Safety Devices  Type of Devices: Gait belt;Nurse notified;Left in bed (Pt sitting on EOB with PT in room at end of session)  Restraints  Restraints Initially in Place: No      Balance  Sitting:  (SBA)  Standing:  (SBA-CGA with RW)       AROM: Within functional limits  Strength: Within functional limits (BUE ~ 4+/5)  Coordination: Within functional limits  Tone: Normal  Sensation: Intact      ADL  Feeding: Setup  Grooming: Setup;Contact guard assistance (Pt stood at sink to complete hand hygiene)  UE Bathing: Setup;Minimal assistance  LE Bathing: Setup;Moderate assistance  UE Dressing: Setup;Minimal assistance (to tie/adjust hosp gown while seated on EOB.)  LE Dressing: Setup;Moderate assistance  Toileting: Contact guard assistance (Pt urinated standing at toilet with RW. RN notified)  Functional Mobility: Contact guard assistance;Stand by assistance  Functional Mobility Skilled Clinical Factors: Pt completed pregait activities at EOB including weight shifting and steps in place with good steadiness. Pt completed functional mobility from bed to toilet, toilet to sink, sink to doorway, down hallway to therapy gym up and down 5 steps x2 back to room all with RW. Pt given Min verbal cues for slowing down, RW safety, proper step sequence with RW and upright posture all to increase safety.  Additional Comments: Pt and family educated on safe ADL completion strategies including, sitting vs standing, EC/WS tech, use of AE as needed, dressing surgical  LE first, use of medicated soap for infection prevention and CAITIE hose wear/purpose. Pt and family verbalized good understanding of all education provided.      Activity Tolerance  Activity Tolerance: Patient tolerated evaluation without incident    Bed mobility  Supine to Sit: Unable to assess  Sit to Supine: Unable to assess  Bed Mobility Comments: Pt sitting on EOB at beginning and end of session. PT in room with pt upon writers depature.      Transfers  Sit to stand: Stand by assistance  Stand to sit: Stand by assistance  Transfer Comments: Pt completed STS transfers without diffiuclites. Pt given Min verbal cues given for RW safety, controlled stand to sit, upright posture, squaring self/AD up to surface and reaching back prior to sitting all to increase safety.      Vision  Vision: Impaired  Vision Exceptions: Wears glasses at all times  Hearing  Hearing: Exceptions to WFL  Hearing Exceptions: Hard of hearing/hearing concerns      Cognition  Overall Cognitive Status: Exceptions  Following Commands: Follows multistep commands with repitition  Safety Judgement: Decreased awareness of need for safety;Decreased awareness of need for assistance  Problem Solving: Decreased awareness of errors  Insights: Decreased awareness of deficits  Orientation  Overall Orientation Status: Within Functional Limits                  Education Given To: Patient  Education Provided: Role of Therapy;Transfer Training;Equipment;Plan of Care;Energy Conservation;Fall Prevention Strategies;ADL Adaptive Strategies;Precautions;Family Education;Home Exercise Program  Education Provided Comments: Pt educated on safety in function, fall prevention/call light use, surgical precautions, proper bed mobility tech, recommendations for continued therapy, proper resting positions, RW use/safety, safe ADL completion strategies (see ADL section for details), use of medicated soap for infection prevention, purpose/wear of CAITIE hose, importance of

## 2024-02-08 NOTE — ANESTHESIA PRE PROCEDURE
AM    RBC 3.81 01/25/2024 09:42 AM    RBC 3.92 06/12/2023 10:20 AM    HGB 11.8 01/25/2024 09:42 AM    HCT 35.8 01/25/2024 09:42 AM    MCV 94.0 01/25/2024 09:42 AM    RDW 13.5 01/25/2024 09:42 AM     01/25/2024 09:42 AM       CMP:   Lab Results   Component Value Date/Time     01/25/2024 09:42 AM    K 4.4 01/25/2024 09:42 AM     01/25/2024 09:42 AM    CO2 21 01/25/2024 09:42 AM    BUN 31 01/25/2024 09:42 AM    CREATININE 1.2 01/25/2024 09:42 AM    GFRAA 73.5 06/12/2023 10:20 AM    AGRATIO 1.4 07/25/2023 10:37 AM    LABGLOM 60 01/25/2024 09:42 AM    GLUCOSE 255 01/25/2024 09:42 AM    GLUCOSE 159 06/12/2023 10:20 AM    PROT 7.0 08/18/2023 05:08 PM    CALCIUM 9.3 01/25/2024 09:42 AM    BILITOT 0.4 08/18/2023 05:08 PM    ALKPHOS 160 08/18/2023 05:08 PM    AST 9 08/18/2023 05:08 PM    ALT 15 08/18/2023 05:08 PM       POC Tests:   Recent Labs     02/08/24  0635   POCGLU 133*       Coags:   Lab Results   Component Value Date/Time    PROTIME 16.2 08/18/2023 05:08 PM    INR 1.3 08/18/2023 05:08 PM    APTT 26.4 08/18/2023 05:08 PM       HCG (If Applicable): No results found for: \"PREGTESTUR\", \"PREGSERUM\", \"HCG\", \"HCGQUANT\"     ABGs: No results found for: \"PHART\", \"PO2ART\", \"CCJ1RHT\", \"YFH0JFA\", \"BEART\", \"S6TOLETO\"     Type & Screen (If Applicable):  No results found for: \"LABABO\", \"LABRH\"    Drug/Infectious Status (If Applicable):  No results found for: \"HIV\", \"HEPCAB\"    COVID-19 Screening (If Applicable):   Lab Results   Component Value Date/Time    COVID19 Not Detected 07/13/2023 11:55 AM    COVID19 DETECTED 01/20/2022 07:58 AM           Anesthesia Evaluation  Patient summary reviewed and Nursing notes reviewed   no history of anesthetic complications:   Airway: Mallampati: II  TM distance: >3 FB   Neck ROM: full  Mouth opening: > = 3 FB   Dental: normal exam         Pulmonary:normal exam                               Cardiovascular:  Exercise tolerance: good (>4 METS)  (+) hypertension:, pacemaker:

## 2024-02-08 NOTE — PROGRESS NOTES
Patient discharged via wheelchair to home with all his belongings in stable condition. Meds to bed delivered prior to discharge. Patient understood and acknowledged AVS.

## 2024-02-08 NOTE — H&P
History and Physical Update    Pt Name: Greg Flores  MRN: [id number]  YOB: 1940  Date of evaluation: 2/8/2024    [x] I have examined the patient and reviewed the H&P/Consult and there are no changes to the patient or plans.    [] I have examined the patient and reviewed the H&P/Consult and have noted the following changes:        Rosas Paul MD   Electronically signed 2/8/2024 at 7:03 AM

## 2024-02-08 NOTE — ANESTHESIA POSTPROCEDURE EVALUATION
Department of Anesthesiology  Postprocedure Note    Patient: Greg Flores  MRN: [id number]  YOB: 1940  Date of evaluation: 2/8/2024    Procedure Summary       Date: 02/08/24 Room / Location: [address]    Anesthesia Start: 0718 Anesthesia Stop: 0931    Procedure: KNEE TOTAL ARTHROPLASTY (Left: Knee) Diagnosis:       Primary localized osteoarthritis of left knee      (Primary localized osteoarthritis of left knee [M17.12])    Surgeons: Rosas Paul MD Responsible Provider: Vic Reyes DO    Anesthesia Type: general ASA Status: 3            Anesthesia Type: No value filed.    Marty Phase I: Marty Score: 8    Marty Phase II:      Anesthesia Post Evaluation    Patient location during evaluation: PACU  Patient participation: complete - patient participated  Level of consciousness: awake and alert  Airway patency: patent  Nausea & Vomiting: no nausea and no vomiting  Cardiovascular status: hemodynamically stable  Respiratory status: acceptable  Hydration status: stable  Pain management: adequate    No notable events documented.

## 2024-02-08 NOTE — PROGRESS NOTES
CLINICAL PHARMACY NOTE: MEDS TO BEDS    Total # of Prescriptions Filled: 2   The following medications were delivered to the patient:  Percocedt 5-325  Ondansetron 4mg    Additional Documentation:    Pt declined stool softener 100mg  Eliquis 2.5mg too soon to fill

## 2024-02-08 NOTE — PROGRESS NOTES
Ortho Face-to-Face Discussion of Medical Necessity for Use of Assistive Device after Joint Replacement Surgery    This patient was evaluated today and a DME order was entered for a wheeled walker because the patient requires this to successfully complete daily living tasks of ambulating.  A wheeled walker is necessary due to the patient's unsteady gait, upper body weakness, and inability to  an ambulation device; and they can ambulate only by pushing a walker instead of a lesser assistive device such as a cane, crutch, or standard walker.  The need for this equipment was discussed with the patient and they understand and are in agreement.

## 2024-02-09 NOTE — OP NOTE
Operative Note      Patient: Greg Flores  YOB: 1940  MRN: [id number]    Date of Procedure: 2/8/2024    Pre-Op Diagnosis Codes:     * Primary localized osteoarthritis of left knee [M17.12]    Post-Op Diagnosis: Same       Procedure(s):  KNEE TOTAL ARTHROPLASTY    Surgeon(s):  Rosas Paul MD    Assistant:   Surgical Assistant: Margarita Stephenson  Resident: Carlos Soto DO    Anesthesia: General    Estimated Blood Loss (mL): 200     Complications: None    Specimens:   * No specimens in log *    Implants:  Implant Name Type Inv. Item Serial No.  Lot No. LRB No. Used Action   CEMENT BNE 40GM HI VISC RADPQ FOR REV SURG - [id number]  CEMENT BNE 40GM HI VISC RADPQ FOR REV SURG  [employer]- [id number] Left 2 Implanted   PSN FEM CR CMT CCR STD SZ10 L - [id number]  PSN FEM CR CMT CCR STD SZ10 L  [employer]- [id number] Left 1 Implanted   COMPONENT PAT [id number] THK8.5MM KNEE POLY BEVERLY CONVENTIONAL - [id number]  COMPONENT PAT [id number] THK8.5MM KNEE POLY BEVERLY CONVENTIONAL  [employer]- [id number] Left 1 Implanted   DUP USE [id number] IMPL KNEE PSN TIB STM 5 DEG SZ G L - [id number] Knee DUP USE [id number] IMPL KNEE PSN TIB STM 5 DEG SZ G L  [employer] [id number] Left 1 Implanted   PSN MC VE ASF L 10MM 8-11 GH - [id number]  PSN MC VE ASF L 10MM 8-11 GH  [employer]- [id number] Left 1 Implanted         Drains: * No LDAs found *    Findings: severe DJD        Detailed Description of Procedure:         Detailed Description of Procedure:   This is a 84-year-old malewith a longstanding history of leftknee pain.  They have failed attempts at conservative management and have elected to undergo a total knee arthroplasty for treatment of the problems.  After informed consent was obtained the patient was brought to the operating room.  Once on the operating room table a spinal anesthetic was placed.  The patient was positioned on the table for a lefttotal knee

## 2024-02-12 RX ORDER — GLIPIZIDE 5 MG/1
5 TABLET ORAL 2 TIMES DAILY
Qty: 60 TABLET | Refills: 0 | Status: SHIPPED | OUTPATIENT
Start: 2024-02-12

## 2024-02-12 NOTE — TELEPHONE ENCOUNTER
Greg Flores is calling to request a refill on the following medication(s):    Last Visit Date (If Applicable):  11/27/2023    Next Visit Date:    Visit date not found    Medication Request:  Requested Prescriptions     Pending Prescriptions Disp Refills    glipiZIDE (GLUCOTROL) 5 MG tablet 60 tablet 0     Sig: Take 1 tablet by mouth 2 times daily

## 2024-02-27 ENCOUNTER — TELEPHONE (OUTPATIENT)
Dept: FAMILY MEDICINE CLINIC | Age: 84
End: 2024-02-27

## 2024-02-27 DIAGNOSIS — D64.9 ANEMIA, UNSPECIFIED TYPE: Primary | ICD-10-CM

## 2024-02-27 NOTE — TELEPHONE ENCOUNTER
Patient daughter calling in inform his ortho doctor believe patient have a low blood count and would like for him to get some lab work ordered, stated they think he is anemic

## 2024-02-28 ENCOUNTER — HOSPITAL ENCOUNTER (OUTPATIENT)
Age: 84
Setting detail: SPECIMEN
Discharge: HOME | End: 2024-02-28
Payer: MEDICARE

## 2024-02-28 DIAGNOSIS — D64.9 ANEMIA, UNSPECIFIED TYPE: ICD-10-CM

## 2024-02-28 LAB
BASOPHILS # BLD: 0.07 K/UL (ref 0–0.2)
BASOPHILS NFR BLD: 1 % (ref 0–2)
EOSINOPHIL # BLD: 0.26 K/UL (ref 0–0.44)
EOSINOPHILS RELATIVE PERCENT: 4 % (ref 1–4)
ERYTHROCYTE [DISTWIDTH] IN BLOOD BY AUTOMATED COUNT: 13.9 % (ref 11.8–14.4)
FOLATE SERPL-MCNC: >20 NG/ML (ref 4.8–24.2)
HCT VFR BLD AUTO: 32.8 % (ref 40.7–50.3)
HGB BLD-MCNC: 10.3 G/DL (ref 13–17)
IMM GRANULOCYTES # BLD AUTO: 0.04 K/UL (ref 0–0.3)
IMM GRANULOCYTES NFR BLD: 1 %
IRON SATN MFR SERPL: 27 % (ref 20–55)
IRON SERPL-MCNC: 92 UG/DL (ref 59–158)
LYMPHOCYTES NFR BLD: 2.04 K/UL (ref 1.1–3.7)
LYMPHOCYTES RELATIVE PERCENT: 35 % (ref 24–43)
MCH RBC QN AUTO: 30.8 PG (ref 25.2–33.5)
MCHC RBC AUTO-ENTMCNC: 31.4 G/DL (ref 28.4–34.8)
MCV RBC AUTO: 98.2 FL (ref 82.6–102.9)
MONOCYTES NFR BLD: 0.77 K/UL (ref 0.1–1.2)
MONOCYTES NFR BLD: 13 % (ref 3–12)
NEUTROPHILS NFR BLD: 46 % (ref 36–65)
NEUTS SEG NFR BLD: 2.73 K/UL (ref 1.5–8.1)
NRBC BLD-RTO: 0 PER 100 WBC
PLATELET # BLD AUTO: 610 K/UL (ref 138–453)
PMV BLD AUTO: 9.7 FL (ref 8.1–13.5)
RBC # BLD AUTO: 3.34 M/UL (ref 4.21–5.77)
TIBC SERPL-MCNC: 338 UG/DL (ref 250–450)
UNSATURATED IRON BINDING CAPACITY: 246 UG/DL (ref 112–347)
VIT B12 SERPL-MCNC: 376 PG/ML (ref 232–1245)
WBC OTHER # BLD: 5.9 K/UL (ref 3.5–11.3)

## 2024-02-28 PROCEDURE — 83540 ASSAY OF IRON: CPT

## 2024-02-28 PROCEDURE — 82746 ASSAY OF FOLIC ACID SERUM: CPT

## 2024-02-28 PROCEDURE — 36415 COLL VENOUS BLD VENIPUNCTURE: CPT

## 2024-02-28 PROCEDURE — 85025 COMPLETE CBC W/AUTO DIFF WBC: CPT

## 2024-02-28 PROCEDURE — 82607 VITAMIN B-12: CPT

## 2024-02-28 PROCEDURE — 83550 IRON BINDING TEST: CPT

## 2024-03-10 ENCOUNTER — HOSPITAL ENCOUNTER (EMERGENCY)
Age: 84
Discharge: HOME | End: 2024-03-11
Attending: EMERGENCY MEDICINE
Payer: MEDICARE

## 2024-03-10 ENCOUNTER — APPOINTMENT (OUTPATIENT)
Dept: GENERAL RADIOLOGY | Age: 84
End: 2024-03-10
Payer: MEDICARE

## 2024-03-10 DIAGNOSIS — R53.83 OTHER FATIGUE: Primary | ICD-10-CM

## 2024-03-10 LAB
ALBUMIN SERPL-MCNC: 4.4 G/DL (ref 3.5–5.2)
ALP SERPL-CCNC: 124 U/L (ref 40–129)
ALT SERPL-CCNC: 6 U/L (ref 5–41)
ANION GAP SERPL CALCULATED.3IONS-SCNC: 16 MMOL/L (ref 9–17)
AST SERPL-CCNC: 5 U/L
BASOPHILS # BLD: 0.06 K/UL (ref 0–0.2)
BASOPHILS NFR BLD: 1 % (ref 0–2)
BILIRUB DIRECT SERPL-MCNC: 0.1 MG/DL
BILIRUB INDIRECT SERPL-MCNC: 0.1 MG/DL (ref 0–1)
BILIRUB SERPL-MCNC: 0.2 MG/DL (ref 0.3–1.2)
BUN SERPL-MCNC: 39 MG/DL (ref 8–23)
BUN/CREAT SERPL: 26 (ref 9–20)
CALCIUM SERPL-MCNC: 9.9 MG/DL (ref 8.6–10.4)
CHLORIDE SERPL-SCNC: 95 MMOL/L (ref 98–107)
CO2 SERPL-SCNC: 25 MMOL/L (ref 20–31)
CREAT SERPL-MCNC: 1.5 MG/DL (ref 0.7–1.2)
EOSINOPHIL # BLD: 0.32 K/UL (ref 0–0.44)
EOSINOPHILS RELATIVE PERCENT: 4 % (ref 1–4)
ERYTHROCYTE [DISTWIDTH] IN BLOOD BY AUTOMATED COUNT: 13.8 % (ref 11.8–14.4)
FLUAV RNA RESP QL NAA+PROBE: NOT DETECTED
FLUBV RNA RESP QL NAA+PROBE: NOT DETECTED
GFR SERPL CREATININE-BSD FRML MDRD: 46 ML/MIN/1.73M2
GLUCOSE SERPL-MCNC: 82 MG/DL (ref 70–99)
HCT VFR BLD AUTO: 34.3 % (ref 40.7–50.3)
HGB BLD-MCNC: 10.8 G/DL (ref 13–17)
IMM GRANULOCYTES # BLD AUTO: 0.11 K/UL (ref 0–0.3)
IMM GRANULOCYTES NFR BLD: 1 %
INR PPP: 1
LYMPHOCYTES NFR BLD: 2.1 K/UL (ref 1.1–3.7)
LYMPHOCYTES RELATIVE PERCENT: 24 % (ref 24–43)
MAGNESIUM SERPL-MCNC: 1.7 MG/DL (ref 1.6–2.6)
MCH RBC QN AUTO: 30.2 PG (ref 25.2–33.5)
MCHC RBC AUTO-ENTMCNC: 31.5 G/DL (ref 28.4–34.8)
MCV RBC AUTO: 95.8 FL (ref 82.6–102.9)
MONOCYTES NFR BLD: 0.93 K/UL (ref 0.1–1.2)
MONOCYTES NFR BLD: 11 % (ref 3–12)
MYOGLOBIN SERPL-MCNC: 40 NG/ML (ref 28–72)
NEUTROPHILS NFR BLD: 59 % (ref 36–65)
NEUTS SEG NFR BLD: 5.32 K/UL (ref 1.5–8.1)
NRBC BLD-RTO: 0 PER 100 WBC
PARTIAL THROMBOPLASTIN TIME: 26.9 SEC (ref 23.9–33.8)
PLATELET # BLD AUTO: 339 K/UL (ref 138–453)
PMV BLD AUTO: 8.9 FL (ref 8.1–13.5)
POTASSIUM SERPL-SCNC: 4.7 MMOL/L (ref 3.7–5.3)
PROT SERPL-MCNC: 7.8 G/DL (ref 6.4–8.3)
PROTHROMBIN TIME: 13.6 SEC (ref 11.5–14.2)
RBC # BLD AUTO: 3.58 M/UL (ref 4.21–5.77)
SARS-COV-2 RNA RESP QL NAA+PROBE: NOT DETECTED
SODIUM SERPL-SCNC: 136 MMOL/L (ref 135–144)
SOURCE: NORMAL
SPECIMEN DESCRIPTION: NORMAL
TROPONIN I SERPL HS-MCNC: 33 NG/L (ref 0–22)
TROPONIN I SERPL HS-MCNC: 35 NG/L (ref 0–22)
WBC OTHER # BLD: 8.8 K/UL (ref 3.5–11.3)

## 2024-03-10 PROCEDURE — 93005 ELECTROCARDIOGRAM TRACING: CPT | Performed by: NURSE PRACTITIONER

## 2024-03-10 PROCEDURE — 80048 BASIC METABOLIC PNL TOTAL CA: CPT

## 2024-03-10 PROCEDURE — 99285 EMERGENCY DEPT VISIT HI MDM: CPT

## 2024-03-10 PROCEDURE — 83735 ASSAY OF MAGNESIUM: CPT

## 2024-03-10 PROCEDURE — 85610 PROTHROMBIN TIME: CPT

## 2024-03-10 PROCEDURE — 85025 COMPLETE CBC W/AUTO DIFF WBC: CPT

## 2024-03-10 PROCEDURE — 87086 URINE CULTURE/COLONY COUNT: CPT

## 2024-03-10 PROCEDURE — 85730 THROMBOPLASTIN TIME PARTIAL: CPT

## 2024-03-10 PROCEDURE — 71045 X-RAY EXAM CHEST 1 VIEW: CPT

## 2024-03-10 PROCEDURE — 87636 SARSCOV2 & INF A&B AMP PRB: CPT

## 2024-03-10 PROCEDURE — 83874 ASSAY OF MYOGLOBIN: CPT

## 2024-03-10 PROCEDURE — 80076 HEPATIC FUNCTION PANEL: CPT

## 2024-03-10 PROCEDURE — 81001 URINALYSIS AUTO W/SCOPE: CPT

## 2024-03-10 PROCEDURE — 84484 ASSAY OF TROPONIN QUANT: CPT

## 2024-03-10 ASSESSMENT — PAIN - FUNCTIONAL ASSESSMENT: PAIN_FUNCTIONAL_ASSESSMENT: NONE - DENIES PAIN

## 2024-03-11 ENCOUNTER — TELEPHONE (OUTPATIENT)
Dept: FAMILY MEDICINE CLINIC | Age: 84
End: 2024-03-11

## 2024-03-11 VITALS
BODY MASS INDEX: 25.48 KG/M2 | RESPIRATION RATE: 13 BRPM | HEIGHT: 70 IN | SYSTOLIC BLOOD PRESSURE: 158 MMHG | OXYGEN SATURATION: 98 % | DIASTOLIC BLOOD PRESSURE: 84 MMHG | WEIGHT: 178 LBS | HEART RATE: 85 BPM | TEMPERATURE: 98.4 F

## 2024-03-11 DIAGNOSIS — I10 PRIMARY HYPERTENSION: ICD-10-CM

## 2024-03-11 LAB
BILIRUB UR QL STRIP: NEGATIVE
CLARITY UR: CLEAR
COLOR UR: YELLOW
EKG ATRIAL RATE: 80 BPM
EKG P AXIS: 71 DEGREES
EKG P-R INTERVAL: 266 MS
EKG Q-T INTERVAL: 404 MS
EKG QRS DURATION: 150 MS
EKG QTC CALCULATION (BAZETT): 465 MS
EKG R AXIS: -73 DEGREES
EKG T AXIS: 58 DEGREES
EKG VENTRICULAR RATE: 80 BPM
EPI CELLS #/AREA URNS HPF: ABNORMAL /HPF (ref 0–5)
GLUCOSE UR STRIP-MCNC: NEGATIVE MG/DL
HGB UR QL STRIP.AUTO: NEGATIVE
KETONES UR STRIP-MCNC: NEGATIVE MG/DL
LEUKOCYTE ESTERASE UR QL STRIP: NEGATIVE
NITRITE UR QL STRIP: NEGATIVE
PH UR STRIP: 6 [PH] (ref 5–8)
PROT UR STRIP-MCNC: ABNORMAL MG/DL
RBC #/AREA URNS HPF: ABNORMAL /HPF (ref 0–2)
SP GR UR STRIP: 1.01 (ref 1–1.03)
UROBILINOGEN UR STRIP-ACNC: NORMAL EU/DL (ref 0–1)
WBC #/AREA URNS HPF: ABNORMAL /HPF (ref 0–5)

## 2024-03-11 PROCEDURE — 93010 ELECTROCARDIOGRAM REPORT: CPT | Performed by: INTERNAL MEDICINE

## 2024-03-11 RX ORDER — GLIPIZIDE 5 MG/1
5 TABLET ORAL 2 TIMES DAILY
Qty: 60 TABLET | Refills: 0 | Status: SHIPPED | OUTPATIENT
Start: 2024-03-11

## 2024-03-11 ASSESSMENT — PAIN - FUNCTIONAL ASSESSMENT: PAIN_FUNCTIONAL_ASSESSMENT: NONE - DENIES PAIN

## 2024-03-11 NOTE — ED PROVIDER NOTES
[facility] ED  eMERGENCY dEPARTMENT eNCOUnter      Pt Name: Greg Flores  MRN: [id number]  Birthdate 1940  Date of evaluation: 3/10/2024  Provider: AMALIA Elizondo CNP    CHIEF COMPLAINT       Chief Complaint   Patient presents with    Dizziness     Pt states he has had dizziness for 2-3 days          HISTORY OF PRESENT ILLNESS  (Location/Symptom, Timing/Onset, Context/Setting, Quality, Duration, Modifying Factors, Severity.)   Greg Flores is a 84 y.o. male who presents to the emergency department.   C/o fatigue, not feeling well for the past 2-3 days. Reports feeling lightheaded. He lost his sense of taste/smell today. Denies fever, chills, HA, vision changes. Denies CP, SOB, back pain. Denies N/V/D, urinary sx. Reports CABG 7/26/23 and a left knee replacement 2/8/24. Denies pain currently. His family member is concerned about his hemoglobin.    Nursing Notes were reviewed.    ALLERGIES     Patient has no known allergies.    CURRENT MEDICATIONS       Previous Medications    AMLODIPINE (NORVASC) 5 MG TABLET    Take 1 tablet by mouth daily    APIXABAN (ELIQUIS) 2.5 MG TABS TABLET    Take 1 tablet by mouth 2 times daily for 12 days    DOCUSATE SODIUM (COLACE) 100 MG CAPSULE    Take 1 capsule by mouth 2 times daily    GLIPIZIDE (GLUCOTROL) 5 MG TABLET    Take 1 tablet by mouth 2 times daily    LISINOPRIL (PRINIVIL;ZESTRIL) 10 MG TABLET    Take 1 tablet by mouth daily    MELATONIN 5 MG TABS TABLET    Take 1 tablet by mouth nightly    METFORMIN (GLUCOPHAGE) 1000 MG TABLET    Take 1 tablet by mouth 2 times daily (with meals)    METOPROLOL TARTRATE (LOPRESSOR) 25 MG TABLET    Take 1 tablet by mouth 2 times daily    ONDANSETRON (ZOFRAN) 4 MG TABLET    Take 1 tablet by mouth as needed    ONDANSETRON (ZOFRAN) 4 MG TABLET    Take 1 tablet by mouth every 6 hours as needed for Nausea    OZEMPIC, 0.25 OR 0.5 MG/DOSE, 2 MG/3ML SOPN    DIAL AND INJECT UNDER THE SKIN 0.25 MG WEEKLY FOR 4 WEEKS THEN

## 2024-03-11 NOTE — TELEPHONE ENCOUNTER
Patients daughter jessica called patient went to ed last night, is still having issues with fatigue and depression and is wanting to be seen today or tomorrow. Please advise.

## 2024-03-11 NOTE — TELEPHONE ENCOUNTER
He is extremely anemic and that is a large part of his problem.   His kidney function is also not very good.   He can be added tomorrow

## 2024-03-11 NOTE — ED PROVIDER NOTES
EMERGENCY DEPARTMENT ENCOUNTER   ATTENDING ATTESTATION     Pt Name: Greg Flores  MRN: [id number]  Birthdate 1940  Date of evaluation: 3/11/24   Greg Flores is a 84 y.o. male with CC: Dizziness (Pt states he has had dizziness for 2-3 days )    MDM:   I performed a substantive part of the MDM during the patient's E/M visit. I personally evaluated and examined the patient. I personally made or approved the documented management plan and acknowledge its risk of complications.    Patient and I discussed results.  Hemoglobin has been stable and is uptrending.  Electrolytes are within normal limits.  Renal function is stable.  Etiology of fatigue is unclear at this time.  Urinalysis still pending however family is eager to get discharged given workup here.  Plan is to call them when urinalysis results.    CRITICAL CARE:       EKG: All EKG's are interpreted by the Emergency Department Physician who either signs or Co-signs this chart in the absence of a cardiologist.  Atrial sensed ventricular paced rhythm rate 80    QTc 465  No Sgarbossa criteria    RADIOLOGY:All plain film, CT, MRI, and formal ultrasound images (except ED bedside ultrasound) are read by the radiologist, see reports below, unless otherwise noted in MDM or here.  XR CHEST PORTABLE   Final Result   Streaky subsegmental atelectasis right lung base.  No acute process.           LABS: All lab results were reviewed by myself, and all abnormals are listed below.  Labs Reviewed   CBC WITH AUTO DIFFERENTIAL - Abnormal; Notable for the following components:       Result Value    RBC 3.58 (*)     Hemoglobin 10.8 (*)     Hematocrit 34.3 (*)     Immature Granulocytes 1 (*)     All other components within normal limits   BASIC METABOLIC PANEL - Abnormal; Notable for the following components:    Chloride 95 (*)     BUN 39 (*)     Creatinine 1.5 (*)     Est, Glom Filt Rate 46 (*)     Bun/Cre Ratio 26 (*)     All other components within normal limits

## 2024-03-11 NOTE — DISCHARGE INSTRUCTIONS
You (family) will contacted regarding urinalysis results.  I do recommend follow-up with your primary care provider.  If symptoms are concerning you may return to the emergency room at any time.

## 2024-03-12 ENCOUNTER — TELEMEDICINE (OUTPATIENT)
Dept: FAMILY MEDICINE CLINIC | Age: 84
End: 2024-03-12
Payer: MEDICARE

## 2024-03-12 DIAGNOSIS — D64.9 ANEMIA, UNSPECIFIED TYPE: Primary | ICD-10-CM

## 2024-03-12 DIAGNOSIS — C85.90 LYMPHOMA IN REMISSION (HCC): ICD-10-CM

## 2024-03-12 DIAGNOSIS — N18.30 CRF (CHRONIC RENAL FAILURE), STAGE 3 (MODERATE) (HCC): ICD-10-CM

## 2024-03-12 DIAGNOSIS — E11.65 TYPE 2 DIABETES MELLITUS WITH HYPERGLYCEMIA, WITHOUT LONG-TERM CURRENT USE OF INSULIN (HCC): ICD-10-CM

## 2024-03-12 DIAGNOSIS — I49.5 TACHY-BRADY SYNDROME (HCC): ICD-10-CM

## 2024-03-12 LAB
MICROORGANISM SPEC CULT: NO GROWTH
SPECIMEN DESCRIPTION: NORMAL

## 2024-03-12 PROCEDURE — 1123F ACP DISCUSS/DSCN MKR DOCD: CPT | Performed by: FAMILY MEDICINE

## 2024-03-12 PROCEDURE — 99214 OFFICE O/P EST MOD 30 MIN: CPT | Performed by: FAMILY MEDICINE

## 2024-03-12 PROCEDURE — G8417 CALC BMI ABV UP PARAM F/U: HCPCS | Performed by: FAMILY MEDICINE

## 2024-03-12 PROCEDURE — 3044F HG A1C LEVEL LT 7.0%: CPT | Performed by: FAMILY MEDICINE

## 2024-03-12 PROCEDURE — 1036F TOBACCO NON-USER: CPT | Performed by: FAMILY MEDICINE

## 2024-03-12 PROCEDURE — G8484 FLU IMMUNIZE NO ADMIN: HCPCS | Performed by: FAMILY MEDICINE

## 2024-03-12 PROCEDURE — G8428 CUR MEDS NOT DOCUMENT: HCPCS | Performed by: FAMILY MEDICINE

## 2024-03-12 RX ORDER — PAROXETINE HYDROCHLORIDE 20 MG/1
20 TABLET, FILM COATED ORAL DAILY
Qty: 90 TABLET | Refills: 11 | Status: SHIPPED | OUTPATIENT
Start: 2024-03-12

## 2024-03-12 RX ORDER — AMLODIPINE BESYLATE 5 MG/1
5 TABLET ORAL DAILY
Qty: 90 TABLET | Refills: 3 | Status: SHIPPED | OUTPATIENT
Start: 2024-03-12

## 2024-03-13 ENCOUNTER — HOSPITAL ENCOUNTER (OUTPATIENT)
Age: 84
Discharge: HOME | End: 2024-03-13
Payer: MEDICARE

## 2024-03-13 DIAGNOSIS — E11.65 TYPE 2 DIABETES MELLITUS WITH HYPERGLYCEMIA, WITHOUT LONG-TERM CURRENT USE OF INSULIN (HCC): ICD-10-CM

## 2024-03-13 DIAGNOSIS — D64.9 ANEMIA, UNSPECIFIED TYPE: ICD-10-CM

## 2024-03-13 DIAGNOSIS — N18.30 CRF (CHRONIC RENAL FAILURE), STAGE 3 (MODERATE) (HCC): ICD-10-CM

## 2024-03-13 LAB
ALBUMIN SERPL-MCNC: 4.2 G/DL (ref 3.5–5.2)
ANION GAP SERPL CALCULATED.3IONS-SCNC: 13 MMOL/L (ref 9–17)
BUN SERPL-MCNC: 35 MG/DL (ref 8–23)
CALCIUM SERPL-MCNC: 9.6 MG/DL (ref 8.6–10.4)
CHLORIDE SERPL-SCNC: 100 MMOL/L (ref 98–107)
CO2 SERPL-SCNC: 23 MMOL/L (ref 20–31)
CREAT SERPL-MCNC: 1.5 MG/DL (ref 0.7–1.2)
EST. AVERAGE GLUCOSE BLD GHB EST-MCNC: 143 MG/DL
FOLATE SERPL-MCNC: >20 NG/ML (ref 4.8–24.2)
GFR SERPL CREATININE-BSD FRML MDRD: 46 ML/MIN/1.73M2
GLUCOSE SERPL-MCNC: 198 MG/DL (ref 70–99)
HBA1C MFR BLD: 6.6 % (ref 4–6)
IMM RETICS NFR: 16.5 % (ref 2.7–18.3)
IRON SATN MFR SERPL: 28 % (ref 20–55)
IRON SERPL-MCNC: 90 UG/DL (ref 59–158)
MAGNESIUM SERPL-MCNC: 1.6 MG/DL (ref 1.6–2.6)
PHOSPHATE SERPL-MCNC: 3.2 MG/DL (ref 2.5–4.5)
POTASSIUM SERPL-SCNC: 3.9 MMOL/L (ref 3.7–5.3)
RETIC HEMOGLOBIN: 33.1 PG (ref 28.2–35.7)
RETICS # AUTO: 0.08 M/UL (ref 0.03–0.08)
RETICS/RBC NFR AUTO: 2.2 % (ref 0.5–1.9)
SODIUM SERPL-SCNC: 136 MMOL/L (ref 135–144)
TIBC SERPL-MCNC: 322 UG/DL (ref 240–450)
UNSATURATED IRON BINDING CAPACITY: 232 UG/DL (ref 112–347)
VIT B12 SERPL-MCNC: 303 PG/ML (ref 232–1245)

## 2024-03-13 PROCEDURE — 83550 IRON BINDING TEST: CPT

## 2024-03-13 PROCEDURE — 82607 VITAMIN B-12: CPT

## 2024-03-13 PROCEDURE — 36415 COLL VENOUS BLD VENIPUNCTURE: CPT

## 2024-03-13 PROCEDURE — 80069 RENAL FUNCTION PANEL: CPT

## 2024-03-13 PROCEDURE — 82746 ASSAY OF FOLIC ACID SERUM: CPT

## 2024-03-13 PROCEDURE — 83540 ASSAY OF IRON: CPT

## 2024-03-13 PROCEDURE — 83036 HEMOGLOBIN GLYCOSYLATED A1C: CPT

## 2024-03-13 PROCEDURE — 83735 ASSAY OF MAGNESIUM: CPT

## 2024-03-13 PROCEDURE — 85045 AUTOMATED RETICULOCYTE COUNT: CPT

## 2024-03-18 RX ORDER — SEMAGLUTIDE 0.68 MG/ML
INJECTION, SOLUTION SUBCUTANEOUS
Qty: 3 ML | Refills: 0 | Status: SHIPPED | OUTPATIENT
Start: 2024-03-18

## 2024-03-18 NOTE — TELEPHONE ENCOUNTER
Current Outpatient Medications on File Prior to Visit   Medication Sig Dispense Refill    amLODIPine (NORVASC) 5 MG tablet TAKE 1 TABLET BY MOUTH DAILY 90 tablet 3    PARoxetine (PAXIL) 20 MG tablet Take 1 tablet by mouth daily 90 tablet 11    glipiZIDE (GLUCOTROL) 5 MG tablet TAKE 1 TABLET BY MOUTH TWICE A DAY 60 tablet 0    docusate sodium (COLACE) 100 MG capsule Take 1 capsule by mouth 2 times daily 30 capsule 0    ondansetron (ZOFRAN) 4 MG tablet Take 1 tablet by mouth every 6 hours as needed for Nausea 30 tablet 1    apixaban (ELIQUIS) 2.5 MG TABS tablet Take 1 tablet by mouth 2 times daily for 12 days 24 tablet 0    OZEMPIC, 0.25 OR 0.5 MG/DOSE, 2 MG/3ML SOPN DIAL AND INJECT UNDER THE SKIN 0.25 MG WEEKLY FOR 4 WEEKS THEN DIAL AND INJECT 0.5 MG WEEKLY THEREAFTER 3 mL 0    lisinopril (PRINIVIL;ZESTRIL) 10 MG tablet Take 1 tablet by mouth daily      ondansetron (ZOFRAN) 4 MG tablet Take 1 tablet by mouth as needed      pravastatin (PRAVACHOL) 80 MG tablet TAKE 1 TABLET BY MOUTH DAILY 90 tablet 3    metoprolol tartrate (LOPRESSOR) 25 MG tablet Take 1 tablet by mouth 2 times daily 180 tablet 1    tamsulosin (FLOMAX) 0.4 MG capsule Take 1 capsule by mouth daily 30 capsule 3    melatonin 5 MG TABS tablet Take 1 tablet by mouth nightly 30 tablet 0    metFORMIN (GLUCOPHAGE) 1000 MG tablet Take 1 tablet by mouth 2 times daily (with meals) 180 tablet 3     No current facility-administered medications on file prior to visit.

## 2024-03-20 ENCOUNTER — HOSPITAL ENCOUNTER (OUTPATIENT)
Dept: PHYSICAL THERAPY | Facility: CLINIC | Age: 84
Setting detail: THERAPIES SERIES
Discharge: HOME | End: 2024-03-20
Payer: MEDICARE

## 2024-03-20 PROCEDURE — 97161 PT EVAL LOW COMPLEX 20 MIN: CPT

## 2024-03-20 PROCEDURE — 97110 THERAPEUTIC EXERCISES: CPT

## 2024-03-20 NOTE — PLAN OF CARE
[] [facility]  Outpatient Rehabilitation &  Therapy  [address]  [phone]  [phone] [x] [facility]  Outpatient Rehabilitation &  Therapy  [address]  P: [phone]  F: [phone] [] [facility]  Outpatient Rehabilitation &  Therapy  [address]  P: [phone]  F: [phone] [] [facility]  Outpatient Rehabilitation &  Therapy  [address]  [phone]  [phone] [] [facility]  Outpatient Rehabilitation &  Therapy  [address]   P: [phone]  F: [phone]  [] [facility]  Outpatient Rehabilitation &  Therapy  [address]  P: [phone]  F: [phone] [] [facility]  Outpatient Rehabilitation &  Therapy  [address]  P: [phone]  F: [phone] [] [facility]  Outpatient Rehabilitation &  Therapy  [address]  P: [phone]  F: [phone] [] [facility]  Outpatient Rehabilitation &  Therapy  [address]  P: [phone]  F: [phone]  [] [facility] Outpatient Rehabilitation &  Therapy  [address]  P: [phone]  F: [phone]       Physical Therapy Plan of Care    Date:  3/20/2024  Patient: Greg Flores  : 1940  MRN: [id number]  Physician: Rosas Paul MD                                           Insurance: [facility] Medicare, Medicaid (VBMN, $0)  Medical Diagnosis: G89.18 (ICD-10-CM) - Acute postoperative pain                    Rehab Codes: M62.81, R26.89, M25.562, M25.462  Onset date: DOS 24                                Next 's appt.: tbd      Subjective:   CC/HPI: 84 y.o. male presents to physical therapy s/p L TKA on 24.      Pt describes onset of pain insidiously approximately 4 years ago that was managed with activity modification and cortisone

## 2024-03-20 NOTE — CONSULTS
[] [facility]  Outpatient Rehabilitation &  Therapy  [address]  [phone]  F: [phone] [x] [facility]  Outpatient Rehabilitation &  Therapy  [address]  P: [phone]  F: [phone] [] [facility]  Outpatient Rehabilitation &  Therapy  [address]  P: [phone]  F: [phone] [] [facility]  Outpatient Rehabilitation &  Therapy  [address]  P: [phone]  F: [phone] [] [facility]  Outpatient Rehabilitation &  Therapy  [address]   [address]   P: [phone]  F: [phone]    Physical Therapy Lower Extremity Evaluation    Date:  3/20/2024  Patient: Greg Flores  : 1940  MRN: [id number]  Physician: Rosas Paul MD    Insurance: [facility] Medicare, Medicaid (VBMN, $0)  Medical Diagnosis: G89.18 (ICD-10-CM) - Acute postoperative pain   Rehab Codes: M62.81, R26.89, M25.562, M25.462  Onset date: DOS 24   Next 's appt.: tbd    Subjective:   CC/HPI: 84 y.o. male presents to physical therapy s/p L TKA on 24.     Pt describes onset of pain insidiously approximately 4 years ago that was managed with activity modification and cortisone injections. Progressively worsened. Pt then underwent L TKA. Pt initially ambulated with rolling walker, saw home health PT until 1 week ago. Pt arrives ambulating independently. Pain and swelling ongoing in knee. Pt has not been taking pain medications and iced and elevated at first but isn't anymore. Some numbness at medial shin but thinks it may be related to open heart surgery 6-7 months ago where they removed veins. Denies buckling.     Hospitalized 3/10 for anemia. Pt was feeling very weak, low appetite, fatigue. Has since improved.       Past Medical History:   Diagnosis Date    Anxiety     with the chemotherapy     CAD (coronary artery disease)     Dr Mccauley, has three stents.    Enlarged prostate

## 2024-03-22 ENCOUNTER — NURSE ONLY (OUTPATIENT)
Dept: FAMILY MEDICINE CLINIC | Age: 84
End: 2024-03-22
Payer: MEDICARE

## 2024-03-22 VITALS — BODY MASS INDEX: 25.45 KG/M2 | HEART RATE: 87 BPM | WEIGHT: 177.4 LBS | OXYGEN SATURATION: 99 %

## 2024-03-22 DIAGNOSIS — D64.9 ANEMIA, UNSPECIFIED TYPE: Primary | ICD-10-CM

## 2024-03-22 PROCEDURE — 96372 THER/PROPH/DIAG INJ SC/IM: CPT | Performed by: NURSE PRACTITIONER

## 2024-03-22 RX ORDER — CYANOCOBALAMIN 1000 UG/ML
1000 INJECTION, SOLUTION INTRAMUSCULAR; SUBCUTANEOUS ONCE
Status: COMPLETED | OUTPATIENT
Start: 2024-03-22 | End: 2024-03-22

## 2024-03-22 RX ADMIN — CYANOCOBALAMIN 1000 MCG: 1000 INJECTION, SOLUTION INTRAMUSCULAR; SUBCUTANEOUS at 13:00

## 2024-03-22 ASSESSMENT — PATIENT HEALTH QUESTIONNAIRE - PHQ9
SUM OF ALL RESPONSES TO PHQ QUESTIONS 1-9: 0
6. FEELING BAD ABOUT YOURSELF - OR THAT YOU ARE A FAILURE OR HAVE LET YOURSELF OR YOUR FAMILY DOWN: NOT AT ALL
5. POOR APPETITE OR OVEREATING: NOT AT ALL
SUM OF ALL RESPONSES TO PHQ9 QUESTIONS 1 & 2: 0
10. IF YOU CHECKED OFF ANY PROBLEMS, HOW DIFFICULT HAVE THESE PROBLEMS MADE IT FOR YOU TO DO YOUR WORK, TAKE CARE OF THINGS AT HOME, OR GET ALONG WITH OTHER PEOPLE: NOT DIFFICULT AT ALL
1. LITTLE INTEREST OR PLEASURE IN DOING THINGS: NOT AT ALL
SUM OF ALL RESPONSES TO PHQ QUESTIONS 1-9: 0
3. TROUBLE FALLING OR STAYING ASLEEP: NOT AT ALL
SUM OF ALL RESPONSES TO PHQ QUESTIONS 1-9: 0
SUM OF ALL RESPONSES TO PHQ QUESTIONS 1-9: 0
9. THOUGHTS THAT YOU WOULD BE BETTER OFF DEAD, OR OF HURTING YOURSELF: NOT AT ALL
7. TROUBLE CONCENTRATING ON THINGS, SUCH AS READING THE NEWSPAPER OR WATCHING TELEVISION: NOT AT ALL
8. MOVING OR SPEAKING SO SLOWLY THAT OTHER PEOPLE COULD HAVE NOTICED. OR THE OPPOSITE, BEING SO FIGETY OR RESTLESS THAT YOU HAVE BEEN MOVING AROUND A LOT MORE THAN USUAL: NOT AT ALL
DEPRESSION UNABLE TO ASSESS: FUNCTIONAL CAPACITY MOTIVATION LIMITS ACCURACY
4. FEELING TIRED OR HAVING LITTLE ENERGY: NOT AT ALL
2. FEELING DOWN, DEPRESSED OR HOPELESS: NOT AT ALL

## 2024-03-25 ENCOUNTER — HOSPITAL ENCOUNTER (OUTPATIENT)
Dept: PHYSICAL THERAPY | Facility: CLINIC | Age: 84
Setting detail: THERAPIES SERIES
Discharge: HOME | End: 2024-03-25
Payer: MEDICARE

## 2024-03-25 PROCEDURE — 97110 THERAPEUTIC EXERCISES: CPT

## 2024-03-25 NOTE — FLOWSHEET NOTE
[] [facility]  Outpatient Rehabilitation &  Therapy  [address]  [phone]  [phone] [x] [facility]  Outpatient Rehabilitation &  Therapy  [address]  P: [phone]  F: [phone] [] [facility]  Outpatient Rehabilitation &  Therapy  [address]  P: [phone]  F: [phone] [] [place]  Outpatient Rehabilitation &  Therapy  [address]  [phone]  [phone] [] [facility]  Outpatient Rehabilitation &  Therapy  [address]   P: [phone]  F: [phone]  [] [facility]  Outpatient Rehabilitation &  Therapy  [address]  P: [phone]  F: [phone] [] [facility]  Outpatient Rehabilitation &  Therapy  [address]  P: [phone]  F: [phone] [] [facility]  Outpatient Rehabilitation &  Therapy  [address]  P: [phone]  F: [phone] [] [facility]  Outpatient Rehabilitation &  Therapy  [address]  P: [phone]  F: [phone]  [] [facility] Outpatient Rehabilitation &  Therapy  [address]  P: [phone]  F: [phone]     Physical Therapy Daily Treatment Note    Date:  3/25/2024  Patient Name:  Greg Flores    :  1940  MRN: [id number]  Physician: Rosas Paul MD                                           Insurance: [facility] Medicare, Medicaid (VBMN, $0)  Medical Diagnosis: G89.18 (ICD-10-CM) - Acute postoperative pain                    Rehab Codes: M62.81, R26.89, M25.562, M25.462  Onset date: DOS 24                                Next 's appt.: tbd  Visit# / total visits: ; Progress note for Medicare patient due at visit 8     Cancels/No Shows: 0/0    Subjective:    Pain:  [] Yes  [x] No Location: L knee  Pain Rating: (0-10 scale) 0/10  Pain

## 2024-03-27 NOTE — PROGRESS NOTES
Greg Flores, was evaluated through a synchronous (real-time) audio-video encounter. The patient (or guardian if applicable) is aware that this is a billable service, which includes applicable co-pays. This Virtual Visit was conducted with patient's (and/or legal guardian's) consent. Patient identification was verified, and a caregiver was present when appropriate.   The patient was located at Home: [address] [place] [address]  Provider was [facility] at [facility] [place]      Greg Flores (:  1940) is a Established patient, presenting virtually for evaluation of the following:    Assessment & Plan   Below is the assessment and plan developed based on review of pertinent history, physical exam, labs, studies, and medications.  1. Anemia, unspecified type  -     Iron and TIBC; Future  -     Reticulocytes; Future  -     Vitamin B12 & Folate; Future  -     Hemoglobin A1C; Future  -     Renal Function Panel; Future  -     Magnesium; Future  -     PARoxetine (PAXIL) 20 MG tablet; Take 1 tablet by mouth daily, Disp-90 tablet, R-11Normal  2. Type 2 diabetes mellitus with hyperglycemia, without long-term current use of insulin (HCC)  -     Iron and TIBC; Future  -     Reticulocytes; Future  -     Vitamin B12 & Folate; Future  -     Hemoglobin A1C; Future  -     Renal Function Panel; Future  -     Magnesium; Future  -     PARoxetine (PAXIL) 20 MG tablet; Take 1 tablet by mouth daily, Disp-90 tablet, R-11Normal  3. CRF (chronic renal failure), stage 3 (moderate) (HCC)  -     Iron and TIBC; Future  -     Reticulocytes; Future  -     Vitamin B12 & Folate; Future  -     Hemoglobin A1C; Future  -     Renal Function Panel; Future  -     Magnesium; Future  -     PARoxetine (PAXIL) 20 MG tablet; Take 1 tablet by mouth daily, Disp-90 tablet, R-11Normal  4. 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 Tachy-kelly syndrome (HCC)  I suggested that we do a morning anemia profile  Will notify him of the test results he also

## 2024-03-28 ENCOUNTER — HOSPITAL ENCOUNTER (OUTPATIENT)
Dept: PHYSICAL THERAPY | Facility: CLINIC | Age: 84
Setting detail: THERAPIES SERIES
Discharge: HOME | End: 2024-03-28
Payer: MEDICARE

## 2024-03-28 PROCEDURE — 97110 THERAPEUTIC EXERCISES: CPT

## 2024-03-28 NOTE — FLOWSHEET NOTE
Pt to display ability to ascend and descend a full flight of stairs independently with reciprocal stepping mechanics and without increased knee pain to ease household access. []  []  []      5. Pt to display ability to ambulate at least 300' independently with appropriate gait mechanics and without knee pain to ease community access. []  []  []                           Patient goals: improve function    Pt. Education:  [x] Yes  [] No  [x] Reviewed Prior HEP/Ed  Method of Education: [x] Verbal ice, gait with cane  [x] Demo  [] Written  Access Code: [email or web address]  URL: [email or web address]  Date: 03/20/2024  Prepared by: Dora Chappell     Exercises  - Supine Heel Slide with Strap  - 1 x daily - 7 x weekly - 10 reps - 5\" hold  - Supine Hamstring Stretch with Strap  - 1 x daily - 7 x weekly - 3 reps - 30\" hold  - Supine Active Straight Leg Raise  - 1 x daily - 7 x weekly - 10 reps  - Seated Long Arc Quad  - 1 x daily - 7 x weekly - 2 sets - 10 reps  - Long Sitting Quad Set with Towel Roll Under Heel  - 1 x daily - 7 x weekly - 3 sets - 10 reps  - Long Sitting 4 Way Patellar [place]  - 1 x daily - 7 x weekly - 3 sets - 10 reps  Comprehension of Education:  [] Verbalizes understanding.  [] Demonstrates understanding.  [] Needs review.  [x] Demonstrates/verbalizes HEP/Ed previously given.     Plan: [x] Continue current frequency toward long and short term goals.    [x] Specific Instructions for subsequent treatments: progress as able       Time In: 3:15 pm              Time Out: 4:00 pm     Electronically signed by:  Patel Purcell PTA

## 2024-04-04 ENCOUNTER — HOSPITAL ENCOUNTER (EMERGENCY)
Age: 84
Discharge: HOME | End: 2024-04-04
Attending: EMERGENCY MEDICINE
Payer: MEDICARE

## 2024-04-04 ENCOUNTER — APPOINTMENT (OUTPATIENT)
Dept: GENERAL RADIOLOGY | Age: 84
End: 2024-04-04
Payer: MEDICARE

## 2024-04-04 VITALS
RESPIRATION RATE: 18 BRPM | HEIGHT: 69 IN | DIASTOLIC BLOOD PRESSURE: 64 MMHG | WEIGHT: 175 LBS | BODY MASS INDEX: 25.92 KG/M2 | TEMPERATURE: 97.8 F | HEART RATE: 68 BPM | SYSTOLIC BLOOD PRESSURE: 122 MMHG | OXYGEN SATURATION: 95 %

## 2024-04-04 DIAGNOSIS — M25.561 RIGHT KNEE PAIN, UNSPECIFIED CHRONICITY: Primary | ICD-10-CM

## 2024-04-04 PROCEDURE — 73562 X-RAY EXAM OF KNEE 3: CPT

## 2024-04-04 PROCEDURE — 99283 EMERGENCY DEPT VISIT LOW MDM: CPT

## 2024-04-04 RX ORDER — HYDROCODONE BITARTRATE AND ACETAMINOPHEN 5; 325 MG/1; MG/1
1 TABLET ORAL EVERY 8 HOURS PRN
Qty: 9 TABLET | Refills: 0 | Status: SHIPPED | OUTPATIENT
Start: 2024-04-04 | End: 2024-04-07

## 2024-04-04 ASSESSMENT — ENCOUNTER SYMPTOMS
COLOR CHANGE: 0
SHORTNESS OF BREATH: 0

## 2024-04-04 ASSESSMENT — PAIN SCALES - GENERAL: PAINLEVEL_OUTOF10: 10

## 2024-04-04 ASSESSMENT — PAIN - FUNCTIONAL ASSESSMENT: PAIN_FUNCTIONAL_ASSESSMENT: 0-10

## 2024-04-04 NOTE — ED PROVIDER NOTES
[facility] ED  eMERGENCY dEPARTMENT eNCOUnter      Pt Name: Greg Flores  MRN: [id number]  Birthdate 1940  Date of evaluation: 4/4/2024  Provider: AMALIA Elizondo CNP    CHIEF COMPLAINT       Chief Complaint   Patient presents with    Knee Pain     Right with swelling had shot a wk ago, hasn't been able to walk since then and pain is worse          HISTORY OF PRESENT ILLNESS  (Location/Symptom, Timing/Onset, Context/Setting, Quality, Duration, Modifying Factors, Severity.)   Greg Flores is a 84 y.o. male who presents to the emergency department. C/o right knee pain. Onset was one week ago after receiving an injection in the knee. Denies fever, chills, weakness, erythema. States he was told to come to the ED today to rule out an effusion. Rates his pain 10/10. He is accompanied by family. He declined medication for his discomfort here. His daughter has an ace wrap with her that she is going to apply to his knee prior to discharge.      Nursing Notes were reviewed.    ALLERGIES     Patient has no known allergies.    CURRENT MEDICATIONS       Discharge Medication List as of 4/4/2024 12:21 PM        CONTINUE these medications which have NOT CHANGED    Details   metoprolol tartrate (LOPRESSOR) 25 MG tablet Take 1 tablet by mouth 2 times daily, Disp-180 tablet, R-3Normal      OZEMPIC, 0.25 OR 0.5 MG/DOSE, 2 MG/3ML SOPN DIAL AND INJECT UNDER THE SKIN 0.25 MG WEEKLY FOR 4 WEEKS THEN DIAL AND INJECT 0.5 MG WEEKLY THEREAFTER, Disp-3 mL, R-0Normal      amLODIPine (NORVASC) 5 MG tablet TAKE 1 TABLET BY MOUTH DAILY, Disp-90 tablet, R-3Normal      PARoxetine (PAXIL) 20 MG tablet Take 1 tablet by mouth daily, Disp-90 tablet, R-11Normal      glipiZIDE (GLUCOTROL) 5 MG tablet TAKE 1 TABLET BY MOUTH TWICE A DAY, Disp-60 tablet, R-0Normal      docusate sodium (COLACE) 100 MG capsule Take 1 capsule by mouth 2 times daily, Disp-30 capsule, R-0Normal      !! ondansetron (ZOFRAN) 4 MG tablet Take 1 tablet

## 2024-04-08 ENCOUNTER — HOSPITAL ENCOUNTER (OUTPATIENT)
Dept: PHYSICAL THERAPY | Facility: CLINIC | Age: 84
Setting detail: THERAPIES SERIES
Discharge: HOME | End: 2024-04-08

## 2024-04-11 ENCOUNTER — HOSPITAL ENCOUNTER (OUTPATIENT)
Dept: PHYSICAL THERAPY | Facility: CLINIC | Age: 84
Setting detail: THERAPIES SERIES
Discharge: HOME | End: 2024-04-11

## 2024-04-11 NOTE — FLOWSHEET NOTE
[] [facility]  Outpatient Rehabilitation &  Therapy  [address]  [phone]  [phone] [x] [facility]  Outpatient Rehabilitation &  Therapy  [address]  P: [phone]  F: [phone] [] [facility]  Outpatient Rehabilitation &  Therapy  [address]  P: [phone]  F: [phone] [] [facility]  Outpatient Rehabilitation &  Therapy  [address]  [phone]  [phone] [] [facility]  Outpatient Rehabilitation &  Therapy  [address]   P: [phone]  F: [phone]  [] [facility]  Outpatient Rehabilitation &  Therapy  [address]  P: [phone]  F: [phone] [] [facility]  Outpatient Rehabilitation &  Therapy  [address]  P: [phone]  F: [phone] [] [facility]  Outpatient Rehabilitation &  Therapy  [address]  P: [phone]  F: [phone] [] [facility]  Outpatient Rehabilitation &  Therapy  [address]  P: [phone]  F: [phone]  [] [facility] Outpatient Rehabilitation &  Therapy  [address]  P: [phone]  F: [phone]     Therapy Cancel/No Show note    Date: 2024  Patient: Greg Flores  : 1940  MRN: [id number]    Cancels/No Shows to date: 0    For today's appointment patient:    [x]  Cancelled    [] Rescheduled appointment    [] No-show     Reason given by patient:    []  Patient ill    []  Conflicting appointment    [] No transportation      [] Conflict with work    [] No reason given    [] Weather related    [] COVID-19    [x] Other:      Comments:  knee hurts cancelling all future appointments.       [x] Next appointment was confirmed    Electronically signed by: JAILENE SHORE, PTA

## 2024-04-15 RX ORDER — IBUPROFEN 800 MG/1
800 TABLET ORAL EVERY 8 HOURS PRN
Qty: 90 TABLET | Refills: 1 | Status: SHIPPED | OUTPATIENT
Start: 2024-04-15

## 2024-04-15 NOTE — TELEPHONE ENCOUNTER
Greg Flores is calling to request a refill on the following medication(s):    Last Visit Date (If Applicable):  3/12/2024    Next Visit Date:    Visit date not found    Medication Request:  Requested Prescriptions     Pending Prescriptions Disp Refills    ibuprofen (ADVIL;MOTRIN) 800 MG tablet 90 tablet 1     Sig: Take 1 tablet by mouth every 8 hours as needed for Pain

## 2024-04-19 ENCOUNTER — APPOINTMENT (OUTPATIENT)
Dept: CT IMAGING | Age: 84
End: 2024-04-19
Payer: MEDICARE

## 2024-04-19 ENCOUNTER — APPOINTMENT (OUTPATIENT)
Dept: ULTRASOUND IMAGING | Age: 84
End: 2024-04-19
Payer: MEDICARE

## 2024-04-19 ENCOUNTER — HOSPITAL ENCOUNTER (EMERGENCY)
Age: 84
Discharge: HOME | End: 2024-04-19
Attending: EMERGENCY MEDICINE
Payer: MEDICARE

## 2024-04-19 VITALS
BODY MASS INDEX: 25.05 KG/M2 | HEART RATE: 74 BPM | TEMPERATURE: 97.8 F | RESPIRATION RATE: 16 BRPM | OXYGEN SATURATION: 96 % | WEIGHT: 175 LBS | DIASTOLIC BLOOD PRESSURE: 63 MMHG | SYSTOLIC BLOOD PRESSURE: 130 MMHG | HEIGHT: 70 IN

## 2024-04-19 DIAGNOSIS — R10.84 GENERALIZED ABDOMINAL PAIN: Primary | ICD-10-CM

## 2024-04-19 DIAGNOSIS — R10.30 LOWER ABDOMINAL PAIN: ICD-10-CM

## 2024-04-19 LAB
ALBUMIN SERPL-MCNC: 4.3 G/DL (ref 3.5–5.2)
ALP SERPL-CCNC: 115 U/L (ref 40–129)
ALT SERPL-CCNC: 6 U/L (ref 5–41)
ANION GAP SERPL CALCULATED.3IONS-SCNC: 13 MMOL/L (ref 9–17)
AST SERPL-CCNC: <5 U/L
BASOPHILS # BLD: 0.05 K/UL (ref 0–0.2)
BASOPHILS NFR BLD: 1 % (ref 0–2)
BILIRUB SERPL-MCNC: 0.3 MG/DL (ref 0.3–1.2)
BILIRUB UR QL STRIP: NEGATIVE
BUN SERPL-MCNC: 31 MG/DL (ref 8–23)
BUN/CREAT SERPL: 28 (ref 9–20)
CALCIUM SERPL-MCNC: 9.5 MG/DL (ref 8.6–10.4)
CHLORIDE SERPL-SCNC: 104 MMOL/L (ref 98–107)
CLARITY UR: CLEAR
CO2 SERPL-SCNC: 21 MMOL/L (ref 20–31)
COLOR UR: YELLOW
CREAT SERPL-MCNC: 1.1 MG/DL (ref 0.7–1.2)
EKG ATRIAL RATE: 72 BPM
EKG P AXIS: 40 DEGREES
EKG P-R INTERVAL: 206 MS
EKG Q-T INTERVAL: 432 MS
EKG QRS DURATION: 156 MS
EKG QTC CALCULATION (BAZETT): 473 MS
EKG R AXIS: -42 DEGREES
EKG T AXIS: 33 DEGREES
EKG VENTRICULAR RATE: 72 BPM
EOSINOPHIL # BLD: 0.25 K/UL (ref 0–0.44)
EOSINOPHILS RELATIVE PERCENT: 3 % (ref 1–4)
EPI CELLS #/AREA URNS HPF: NORMAL /HPF (ref 0–5)
ERYTHROCYTE [DISTWIDTH] IN BLOOD BY AUTOMATED COUNT: 13.5 % (ref 11.8–14.4)
GFR SERPL CREATININE-BSD FRML MDRD: 66 ML/MIN/1.73M2
GLUCOSE SERPL-MCNC: 191 MG/DL (ref 70–99)
GLUCOSE UR STRIP-MCNC: NEGATIVE MG/DL
HCT VFR BLD AUTO: 33.3 % (ref 40.7–50.3)
HGB BLD-MCNC: 10.8 G/DL (ref 13–17)
HGB UR QL STRIP.AUTO: ABNORMAL
IMM GRANULOCYTES # BLD AUTO: 0.06 K/UL (ref 0–0.3)
IMM GRANULOCYTES NFR BLD: 1 %
KETONES UR STRIP-MCNC: NEGATIVE MG/DL
LACTATE BLDV-SCNC: 2.3 MMOL/L (ref 0.5–2.2)
LEUKOCYTE ESTERASE UR QL STRIP: NEGATIVE
LIPASE SERPL-CCNC: 16 U/L (ref 13–60)
LYMPHOCYTES NFR BLD: 1.61 K/UL (ref 1.1–3.7)
LYMPHOCYTES RELATIVE PERCENT: 18 % (ref 24–43)
MCH RBC QN AUTO: 29.9 PG (ref 25.2–33.5)
MCHC RBC AUTO-ENTMCNC: 32.4 G/DL (ref 28.4–34.8)
MCV RBC AUTO: 92.2 FL (ref 82.6–102.9)
MONOCYTES NFR BLD: 0.81 K/UL (ref 0.1–1.2)
MONOCYTES NFR BLD: 9 % (ref 3–12)
NEUTROPHILS NFR BLD: 68 % (ref 36–65)
NEUTS SEG NFR BLD: 6.16 K/UL (ref 1.5–8.1)
NITRITE UR QL STRIP: NEGATIVE
NRBC BLD-RTO: 0 PER 100 WBC
PH UR STRIP: 5.5 [PH] (ref 5–8)
PLATELET # BLD AUTO: 293 K/UL (ref 138–453)
PMV BLD AUTO: 9.2 FL (ref 8.1–13.5)
POTASSIUM SERPL-SCNC: 3.9 MMOL/L (ref 3.7–5.3)
PROT SERPL-MCNC: 7 G/DL (ref 6.4–8.3)
PROT UR STRIP-MCNC: ABNORMAL MG/DL
RBC # BLD AUTO: 3.61 M/UL (ref 4.21–5.77)
RBC #/AREA URNS HPF: NORMAL /HPF (ref 0–2)
SODIUM SERPL-SCNC: 138 MMOL/L (ref 135–144)
SP GR UR STRIP: 1.01 (ref 1–1.03)
TROPONIN I SERPL HS-MCNC: 22 NG/L (ref 0–22)
TROPONIN I SERPL HS-MCNC: 25 NG/L (ref 0–22)
UROBILINOGEN UR STRIP-ACNC: NORMAL EU/DL (ref 0–1)
WBC #/AREA URNS HPF: NORMAL /HPF (ref 0–5)
WBC OTHER # BLD: 8.9 K/UL (ref 3.5–11.3)

## 2024-04-19 PROCEDURE — 96374 THER/PROPH/DIAG INJ IV PUSH: CPT

## 2024-04-19 PROCEDURE — 93005 ELECTROCARDIOGRAM TRACING: CPT | Performed by: EMERGENCY MEDICINE

## 2024-04-19 PROCEDURE — 80053 COMPREHEN METABOLIC PANEL: CPT

## 2024-04-19 PROCEDURE — 96375 TX/PRO/DX INJ NEW DRUG ADDON: CPT

## 2024-04-19 PROCEDURE — 99285 EMERGENCY DEPT VISIT HI MDM: CPT

## 2024-04-19 PROCEDURE — 81001 URINALYSIS AUTO W/SCOPE: CPT

## 2024-04-19 PROCEDURE — 85025 COMPLETE CBC W/AUTO DIFF WBC: CPT

## 2024-04-19 PROCEDURE — 76705 ECHO EXAM OF ABDOMEN: CPT

## 2024-04-19 PROCEDURE — 6360000004 HC RX CONTRAST MEDICATION: Performed by: EMERGENCY MEDICINE

## 2024-04-19 PROCEDURE — 74177 CT ABD & PELVIS W/CONTRAST: CPT

## 2024-04-19 PROCEDURE — 2580000003 HC RX 258: Performed by: EMERGENCY MEDICINE

## 2024-04-19 PROCEDURE — 83605 ASSAY OF LACTIC ACID: CPT

## 2024-04-19 PROCEDURE — 83690 ASSAY OF LIPASE: CPT

## 2024-04-19 PROCEDURE — 6360000002 HC RX W HCPCS: Performed by: EMERGENCY MEDICINE

## 2024-04-19 PROCEDURE — 84484 ASSAY OF TROPONIN QUANT: CPT

## 2024-04-19 RX ORDER — SODIUM CHLORIDE 0.9 % (FLUSH) 0.9 %
10 SYRINGE (ML) INJECTION PRN
Status: DISCONTINUED | OUTPATIENT
Start: 2024-04-19 | End: 2024-04-19 | Stop reason: HOSPADM

## 2024-04-19 RX ORDER — MORPHINE SULFATE 4 MG/ML
4 INJECTION, SOLUTION INTRAMUSCULAR; INTRAVENOUS ONCE
Status: COMPLETED | OUTPATIENT
Start: 2024-04-19 | End: 2024-04-19

## 2024-04-19 RX ORDER — METOCLOPRAMIDE HYDROCHLORIDE 5 MG/ML
10 INJECTION INTRAMUSCULAR; INTRAVENOUS ONCE
Status: COMPLETED | OUTPATIENT
Start: 2024-04-19 | End: 2024-04-19

## 2024-04-19 RX ORDER — 0.9 % SODIUM CHLORIDE 0.9 %
80 INTRAVENOUS SOLUTION INTRAVENOUS ONCE
Status: COMPLETED | OUTPATIENT
Start: 2024-04-19 | End: 2024-04-19

## 2024-04-19 RX ORDER — 0.9 % SODIUM CHLORIDE 0.9 %
500 INTRAVENOUS SOLUTION INTRAVENOUS ONCE
Status: COMPLETED | OUTPATIENT
Start: 2024-04-19 | End: 2024-04-19

## 2024-04-19 RX ORDER — ONDANSETRON 2 MG/ML
4 INJECTION INTRAMUSCULAR; INTRAVENOUS ONCE
Status: COMPLETED | OUTPATIENT
Start: 2024-04-19 | End: 2024-04-19

## 2024-04-19 RX ADMIN — MORPHINE SULFATE 4 MG: 4 INJECTION, SOLUTION INTRAMUSCULAR; INTRAVENOUS at 05:00

## 2024-04-19 RX ADMIN — SODIUM CHLORIDE, PRESERVATIVE FREE 10 ML: 5 INJECTION INTRAVENOUS at 06:25

## 2024-04-19 RX ADMIN — ONDANSETRON 4 MG: 2 INJECTION INTRAMUSCULAR; INTRAVENOUS at 05:00

## 2024-04-19 RX ADMIN — IOPAMIDOL 75 ML: 755 INJECTION, SOLUTION INTRAVENOUS at 06:25

## 2024-04-19 RX ADMIN — METOCLOPRAMIDE HYDROCHLORIDE 10 MG: 5 INJECTION INTRAMUSCULAR; INTRAVENOUS at 05:47

## 2024-04-19 RX ADMIN — SODIUM CHLORIDE 80 ML: 0.9 INJECTION, SOLUTION INTRAVENOUS at 06:24

## 2024-04-19 RX ADMIN — SODIUM CHLORIDE 500 ML: 9 INJECTION, SOLUTION INTRAVENOUS at 04:56

## 2024-04-19 ASSESSMENT — PAIN SCALES - GENERAL
PAINLEVEL_OUTOF10: 10
PAINLEVEL_OUTOF10: 4

## 2024-04-19 ASSESSMENT — HEART SCORE: ECG: NORMAL

## 2024-04-19 ASSESSMENT — PAIN - FUNCTIONAL ASSESSMENT: PAIN_FUNCTIONAL_ASSESSMENT: 0-10

## 2024-04-19 NOTE — ED PROVIDER NOTES
08:59:24 AM      CLINICAL IMPRESSION:  1. Generalized abdominal pain    2. Lower abdominal pain        PATIENT REFERRED TO:  [facility],   [address] [address]  [phone]            DISCHARGE MEDICATIONS:  Discharge Medication List as of 4/19/2024  8:59 AM        START taking these medications    Details   naloxegol (MOVANTIK) 25 MG TABS tablet Take 1 tablet by mouth every morning (before breakfast), Disp-30 tablet, R-0Normal             Vivienne Lopez MD  Attending Emergency Physician              Vivienne Lopez MD  04/19/24 [id number]    
heard.  Pulmonary:      Effort: Pulmonary effort is normal. No respiratory distress.      Breath sounds: Normal breath sounds. No wheezing or rhonchi.   Abdominal:      General: Bowel sounds are normal.      Palpations: Abdomen is soft. There is no mass.      Tenderness: There is abdominal tenderness in the right upper quadrant and right lower quadrant. There is no right CVA tenderness, left CVA tenderness, guarding or rebound.   Musculoskeletal:         General: Normal range of motion.      Cervical back: No rigidity.   Skin:     General: Skin is warm and dry.      Capillary Refill: Capillary refill takes less than 2 seconds.   Neurological:      General: No focal deficit present.      Mental Status: He is alert and oriented to person, place, and time.   Psychiatric:         Mood and Affect: Mood normal.         Thought Content: Thought content normal.         MEDICAL DECISION MAKING/ED Course:     - EKG  - CBC, CMP, lipase, lactic, tropx2  - CT a/p w/con  - IV morphine  - IV zofran, reglan  - 500 cc NS bolus    7:01 AM    84-year-old male presents to the ED for abdominal pain radiating to the right flank for the past several hours.  On presentation he appears to be in significant distress and has abdominal tenderness, stable vital signs.  Also developed some chest pain while in the ED.  Differential diagnosis includes ACS, cholecystitis, pancreatitis, gastroenteritis, bowel obstruction, other intra-abdominal process.  EKG is normal sinus rhythm without ischemic changes, labs without acute findings other than minimal lactic acid elevation, high-sensitivity troponin of 25 is about baseline, repeat pending.  At this time CT scan results are pending, patient appears more comfortable after pain medications.  Patient care signed out at shift change to Dr. Lopez with plan to follow-up CT results, reassess patient and disposition as appropriate.    DIAGNOSTIC RESULTS   EKG:All EKG's are interpreted by the Emergency

## 2024-04-19 NOTE — ED NOTES
Dr. Kenyon aware of patient c/o CP when documenting RN in room to initiate IVF bolus. Orders received for Trop and EKG.

## 2024-04-24 ENCOUNTER — TELEPHONE (OUTPATIENT)
Dept: FAMILY MEDICINE CLINIC | Age: 84
End: 2024-04-24

## 2024-04-24 DIAGNOSIS — N18.30 CRF (CHRONIC RENAL FAILURE), STAGE 3 (MODERATE) (HCC): Primary | ICD-10-CM

## 2024-04-24 NOTE — TELEPHONE ENCOUNTER
Patients wife called in stating the patient is needing a referral to a nephrologist his preference is     [facility]   [phone]      Please advise

## 2024-05-02 RX ORDER — SEMAGLUTIDE 0.68 MG/ML
INJECTION, SOLUTION SUBCUTANEOUS
Qty: 3 ML | Refills: 0 | Status: SHIPPED | OUTPATIENT
Start: 2024-05-02

## 2024-05-02 NOTE — TELEPHONE ENCOUNTER
Greg Flores is calling to request a refill on the following medication(s):    Last Visit Date (If Applicable):  3/12/2024    Next Visit Date:    Visit date not found    Medication Request:  Requested Prescriptions     Pending Prescriptions Disp Refills    OZEMPIC, 0.25 OR 0.5 MG/DOSE, 2 MG/3ML SOPN [Pharmacy Med Name: OZEMPIC 0.25-0.5 MG/DOSE(2 MG/3 ML)] 3 mL 0     Sig: DIAL AND INJECT UNDER THE SKIN 0.25 MG WEEKLY FOR 4 WEEKS THEN DIAL AND INJECT 0.5 MG WEEKLY THEREAFTER

## 2024-05-09 NOTE — DISCHARGE INSTR - COC
Continuity of Care Form    Patient Name: Shiva Castro   :  1940  MRN:  [id number]    Admit date:  2023  Discharge date:  23    Code Status Order: Full Code   Advance Directives:     Admitting Physician:  Aliya Jones MD  PCP: Iggy Montaño DO    Discharging Nurse: [facility] Unit/Room#:   Discharging Unit Phone Number: [phone]    Emergency Contact:   Extended Emergency Contact Information  Primary Emergency Contact: [address] Phone: [phone]  Relation: Child  Preferred language: English  Secondary Emergency Contact: [address] Phone: [phone]  Relation: Son-in-Law    Past Surgical History:  Past Surgical History:   Procedure Laterality Date    APPENDECTOMY  age 20    BACK SURGERY      CAROTID ENDARTERECTOMY Right     2017    COLONOSCOPY      [facility]    CORONARY ANGIOPLASTY WITH STENT PLACEMENT      two occasions, one stent first, and then two the next time, Dr Karina Navarro N/A 2023    CABG CORONARY ARTERY BYPASS X3 ON PUMP, KATHERINE performed by Aliya Jones MD at [address] [facility]       Immunization History:   Immunization History   Administered Date(s) Administered    Influenza 10/07/2013    Influenza Virus Vaccine 2013, 10/06/2014    Pneumococcal, PPSV23, PNEUMOVAX 21, (age 2y+), SC/IM, 0.5mL 2013       Active Problems:  Patient Active Problem List   Diagnosis Code    Lymphoma in remission ([address]) C85.90    Hypertension I10    Anxiety F41.9    Type 2 diabetes mellitus with hyperglycemia, without long-term current use of insulin (HCC) E11.65    Gout M10.9    CAD (coronary artery disease) I25.10    GERD (gastroesophageal reflux disease) K21.9    Hoarseness of voice R49.0    Aphthous stomatitis K12.0    Acute pharyngitis J02.9    Hyperlipidemia, mixed E78.2    Contusion of second toe, left I03.223T    Urinary frequency R35.0    HTN (hypertension) I10    Hyperlipemia, mixed E78.2    Headache R51.9 Normal for race not applicable

## 2024-05-10 ENCOUNTER — APPOINTMENT (OUTPATIENT)
Dept: GENERAL RADIOLOGY | Age: 84
End: 2024-05-10
Payer: MEDICARE

## 2024-05-10 ENCOUNTER — HOSPITAL ENCOUNTER (EMERGENCY)
Age: 84
Discharge: HOME | End: 2024-05-10
Attending: EMERGENCY MEDICINE
Payer: MEDICARE

## 2024-05-10 VITALS
RESPIRATION RATE: 16 BRPM | BODY MASS INDEX: 26.52 KG/M2 | DIASTOLIC BLOOD PRESSURE: 74 MMHG | HEART RATE: 86 BPM | WEIGHT: 175 LBS | HEIGHT: 68 IN | TEMPERATURE: 97.4 F | OXYGEN SATURATION: 94 % | SYSTOLIC BLOOD PRESSURE: 132 MMHG

## 2024-05-10 DIAGNOSIS — M79.642 LEFT HAND PAIN: Primary | ICD-10-CM

## 2024-05-10 DIAGNOSIS — M25.532 LEFT WRIST PAIN: ICD-10-CM

## 2024-05-10 PROCEDURE — 99283 EMERGENCY DEPT VISIT LOW MDM: CPT

## 2024-05-10 PROCEDURE — 73110 X-RAY EXAM OF WRIST: CPT

## 2024-05-10 PROCEDURE — 6370000000 HC RX 637 (ALT 250 FOR IP): Performed by: NURSE PRACTITIONER

## 2024-05-10 PROCEDURE — 73130 X-RAY EXAM OF HAND: CPT

## 2024-05-10 RX ORDER — PREDNISONE 20 MG/1
40 TABLET ORAL ONCE
Status: COMPLETED | OUTPATIENT
Start: 2024-05-10 | End: 2024-05-10

## 2024-05-10 RX ORDER — HYDROCODONE BITARTRATE AND ACETAMINOPHEN 5; 325 MG/1; MG/1
1 TABLET ORAL EVERY 8 HOURS PRN
Qty: 8 TABLET | Refills: 0 | Status: SHIPPED | OUTPATIENT
Start: 2024-05-10 | End: 2024-05-13

## 2024-05-10 RX ORDER — PREDNISONE 20 MG/1
20 TABLET ORAL 2 TIMES DAILY
Qty: 10 TABLET | Refills: 0 | Status: SHIPPED | OUTPATIENT
Start: 2024-05-10 | End: 2024-05-15

## 2024-05-10 RX ADMIN — PREDNISONE 40 MG: 20 TABLET ORAL at 10:04

## 2024-05-10 ASSESSMENT — PAIN - FUNCTIONAL ASSESSMENT: PAIN_FUNCTIONAL_ASSESSMENT: 0-10

## 2024-05-10 ASSESSMENT — ENCOUNTER SYMPTOMS
SHORTNESS OF BREATH: 0
COLOR CHANGE: 0

## 2024-05-10 ASSESSMENT — PAIN SCALES - GENERAL: PAINLEVEL_OUTOF10: 9

## 2024-05-10 NOTE — ED PROVIDER NOTES
eMERGENCY dEPARTMENT eNCOUnter   Independent Attestation     Pt Name: Greg Flores  MRN: [id number]  Birthdate 1940  Date of evaluation: 5/10/24     Greg Flores is a 84 y.o. male with CC: Hand Pain (L. Swelling and pain since Tuesday)      Based on the medical record the care appears appropriate.  I was personally available for consultation in the Emergency Department.    Cristofer Ruiz MD  Attending Emergency Physician          Cristofer Ruiz MD  05/10/24 1016    
diaphoretic.   Eyes:      General: No scleral icterus.     Conjunctiva/sclera: Conjunctivae normal.   Cardiovascular:      Rate and Rhythm: Normal rate.      Pulses: Normal pulses.   Pulmonary:      Effort: Pulmonary effort is normal. No respiratory distress.      Breath sounds: No stridor.   Musculoskeletal:      Left forearm: No swelling, deformity, tenderness or bony tenderness.      Left wrist: Swelling and tenderness present. No deformity. Decreased range of motion. Normal pulse.      Left hand: Swelling and tenderness present. No deformity or bony tenderness. Decreased range of motion. There is no disruption of two-point discrimination. Normal capillary refill. Normal pulse.      Cervical back: Neck supple.   Skin:     General: Skin is warm and dry.      Capillary Refill: Capillary refill takes less than 2 seconds.      Findings: No rash.      Comments: No increased warmth noted at this time to left wrist/hand when compared to right wrist/hand.    Neurological:      Mental Status: He is alert and oriented to person, place, and time.   Psychiatric:         Behavior: Behavior normal.          DIAGNOSTIC RESULTS     RADIOLOGY:   Non-plain film images such as CT, Ultrasound and MRI are read by the radiologist. Plain radiographic images are visualized and preliminarily interpreted by the emergency physician with the below findings:    Interpretation per the Radiologist below, if available at the time of this note:    XR WRIST LEFT (MIN 3 VIEWS)    Result Date: 5/10/2024  EXAMINATION: 4 XRAY VIEWS OF THE LEFT WRIST 5/10/2024 10:04 am COMPARISON: None. HISTORY: ORDERING SYSTEM PROVIDED HISTORY: pain TECHNOLOGIST PROVIDED HISTORY: pain Reason for Exam: States wrist pain that now radiates to hand x 3 days.  No injury. FINDINGS: There is no acute osseous abnormality.  The joint spaces are maintained.  The surrounding soft tissues are unremarkable.     No acute osseous or soft tissue abnormality.     XR HAND LEFT (MIN 3

## 2024-05-18 NOTE — ED PROVIDER NOTES
[address] [place] [address] ED  eMERGENCY dEPARTMENT eNCOUnter      Pt Name: Justin Ramirez  MRN: [id number]  Lluviagfdavid 1940  Date of evaluation: 2/22/2018  Provider: Herbert Patrick NP    [address] [place] [address]       Chief Complaint   Patient presents with    Foot Pain     lt foot pain since fall 2 days ago         HISTORY OF PRESENT ILLNESS  (Location/Symptom, Timing/Onset, Context/Setting, Quality, Duration, Modifying Factors, Severity.)   Justin Ramirez is a 66 y.o. male who presents to the emergency department With complaints of right calcaneal pain. He states he was coming down some steps and missed the small stairs in put his full weight on his right heel as it struck into cement. This happened 3 days ago. He's been unable to fully bear weight on his foot since the incident. He has been taking Aleve at home with some resolution of pain. Comfort is achieved by elevating the foot and avoiding weightbearing. He denies any sensory deficit. No open wounds. His wife drove him to the hospital.      Nursing Notes were reviewed. ALLERGIES     Review of patient's allergies indicates no known allergies. CURRENT MEDICATIONS       Discharge Medication List as of 2/22/2018  3:22 PM      CONTINUE these medications which have NOT CHANGED    Details   !! ibuprofen (ADVIL;MOTRIN) 800 MG tablet Take 800 mg by mouth every 6 hours as needed for PainHistorical Med      glipiZIDE (GLIPIZIDE XL) 5 MG CR tablet TAKE 1 TABLET BY MOUTH DAILY, Disp-90 tablet, R-1      metFORMIN (GLUCOPHAGE) 500 MG tablet Take 1 tablet by mouth 2 times daily (with meals). , Disp-180 tablet, R-1      allopurinol (ZYLOPRIM) 300 MG tablet TAKE 1 TABLET BY MOUTH DAILY, Disp-90 tablet, R-1      metoprolol (TOPROL-XL) 100 MG XL tablet TAKE 1 TABLET BY MOUTH DAILY, Disp-90 tablet, R-1      lisinopril (PRINIVIL;ZESTRIL) 10 MG tablet TAKE 1 TABLET BY MOUTH DAILY, Disp-90 tablet, R-1      pravastatin (PRAVACHOL) 80 MG tablet TAKE 1 TABLET BY MOUTH EVERY DAY, Nursing report ED to floor  Jimmy Norman  70 y.o.  male    HPI :  HPI (Adult)  Stated Reason for Visit: Pt reports neck pain and body aches x 1 week.  History Obtained From: patient, family  Precipitating Event(s): unknown  Duration (Weeks): 1    Chief Complaint  Chief Complaint   Patient presents with    Neck Pain    Generalized Body Aches       Admitting doctor:   Juan Ramon Hinojosa MD    Admitting diagnosis:   The primary encounter diagnosis was Pneumonia of both lower lobes due to infectious organism. Diagnoses of Hypotension, unspecified hypotension type and Myalgia were also pertinent to this visit.    Code status:   Current Code Status       Date Active Code Status Order ID Comments User Context       5/18/2024 0308 CPR (Attempt to Resuscitate) [id number]  Leslee Orozco APRN ED        Question Answer    Code Status (Patient has no pulse and is not breathing) CPR (Attempt to Resuscitate)    Medical Interventions (Patient has pulse or is breathing) Full Support                    Allergies:   Patient has no known allergies.    Isolation:   No active isolations    Intake and Output    Intake/Output Summary (Last 24 hours) at 5/18/2024 0337  Last data filed at 5/18/2024 0144  Gross per 24 hour   Intake 100 ml   Output --   Net 100 ml       Weight:       05/17/24  2250   Weight: 86.2 kg (190 lb)       Most recent vitals:   Vitals:    05/17/24 2257 05/18/24 0101 05/18/24 0301 05/18/24 0302   BP: (!) 82/58 108/77 111/79    BP Location: Right arm      Patient Position: Sitting      Pulse:  82 74 70   Resp:       Temp:       TempSrc:       SpO2:  92% 94% 95%   Weight:       Height:           Active LDAs/IV Access:   Lines, Drains & Airways       Active LDAs       Name Placement date Placement time Site Days    Peripheral IV Right Antecubital --  --  Antecubital  --                    Labs (abnormal labs have a star):   Labs Reviewed   COMPREHENSIVE METABOLIC PANEL - Abnormal; Notable for the  "following components:       Result Value    Glucose 122 (*)     Creatinine 1.35 (*)     Sodium 133 (*)     CO2 21.0 (*)     ALT (SGPT) 52 (*)     AST (SGOT) 73 (*)     eGFR 56.5 (*)     All other components within normal limits    Narrative:     GFR Normal >60  Chronic Kidney Disease <60  Kidney Failure <15     PROTIME-INR - Abnormal; Notable for the following components:    Protime 14.6 (*)     INR 1.12 (*)     All other components within normal limits   URINALYSIS W/ MICROSCOPIC IF INDICATED (NO CULTURE) - Abnormal; Notable for the following components:    Color, UA Dark Yellow (*)     Protein,  mg/dL (2+) (*)     All other components within normal limits   PROCALCITONIN - Abnormal; Notable for the following components:    Procalcitonin 0.39 (*)     All other components within normal limits    Narrative:     As a Marker for Sepsis (Non-Neonates):    1. <0.5 ng/mL represents a low risk of severe sepsis and/or septic shock.  2. >2 ng/mL represents a high risk of severe sepsis and/or septic shock.    As a Marker for Lower Respiratory Tract Infections that require antibiotic therapy:    PCT on Admission    Antibiotic Therapy       6-12 Hrs later    >0.5                Strongly Recommended  >0.25 - <0.5        Recommended   0.1 - 0.25          Discouraged              Remeasure/reassess PCT  <0.1                Strongly Discouraged     Remeasure/reassess PCT    As 28 day mortality risk marker: \"Change in Procalcitonin Result\" (>80% or <=80%) if Day 0 (or Day 1) and Day 4 values are available. Refer to http://www.[employer]-pct-calculator.com    Change in PCT <=80%  A decrease of PCT levels below or equal to 80% defines a positive change in PCT test result representing a higher risk for 28-day all-cause mortality of patients diagnosed with severe sepsis for septic shock.    Change in PCT >80%  A decrease of PCT levels of more than 80% defines a negative change in PCT result representing a lower risk for 28-day " Disp-90 tablet, R-1      furosemide (LASIX) 20 MG tablet Take 1 tablet by mouth daily. , Disp-60 tablet, R-3      butalbital-acetaminophen-caffeine (FIORICET, ESGIC) -40 MG per tablet Take 1 tablet by mouth every 4 hours as needed for Pain., Disp-60 tablet, R-0      !! ibuprofen (ADVIL;MOTRIN) 400 MG tablet Take 1 tablet by mouth every 8 hours as needed. , Disp-120 tablet, R-3      docusate sodium (COLACE) 100 MG capsule Take 1 capsule by mouth daily as needed for Constipation. meclizine (ANTIVERT) 25 MG tablet Take 25 mg by mouth 4 times daily. neomycin-polymyxin-dexameth [id number] OINT Starting 8/26/2014, Until Discontinued, R-0, Historical Med      pantoprazole (PROTONIX) 40 MG tablet Take 1 tablet by mouth daily. , Disp-90 tablet, R-1      ondansetron (ZOFRAN ODT) 4 MG disintegrating tablet Take 1 tablet by mouth every 8 hours as needed for Nausea or Other (and as needed for dizziness). , Disp-20 tablet, R-0      aspirin 325 MG tablet Take 325 mg by mouth daily. !! - Potential duplicate medications found. Please discuss with provider. PAST MEDICAL HISTORY         Diagnosis Date    Anxiety     with the chemotherapy     CAD (coronary artery disease)     Dr Pipe Talley, has three stents.    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    Dr Bryan Wilkins, non hodgkin's    Type II or unspecified type diabetes mellitus without mention of complication, not stated as uncontrolled age 79       SURGICAL HISTORY           Procedure Laterality Date    APPENDECTOMY  age 25    COLONOSCOPY      [facility]    CORONARY ANGIOPLASTY WITH STENT PLACEMENT      two occasions, one stent first, and then two the next time, Dr Mark Ward           Problem Relation Age of Onset    High Blood Pressure Sister     Diabetes Sister     High Blood Pressure Brother     Diabetes Brother      Family all-cause mortality of patients diagnosed with severe sepsis or septic shock.      SEDIMENTATION RATE - Abnormal; Notable for the following components:    Sed Rate 97 (*)     All other components within normal limits   C-REACTIVE PROTEIN - Abnormal; Notable for the following components:    C-Reactive Protein 7.07 (*)     All other components within normal limits   CBC WITH AUTO DIFFERENTIAL - Abnormal; Notable for the following components:    RBC 4.00 (*)     Hemoglobin 12.0 (*)     Hematocrit 35.2 (*)     Lymphocyte % 16.6 (*)     Monocyte % 12.4 (*)     Eosinophil % 0.0 (*)     All other components within normal limits   HIGH SENSITIVITIY TROPONIN T 2HR - Abnormal; Notable for the following components:    Troponin T Delta -4 (*)     All other components within normal limits    Narrative:     High Sensitive Troponin T Reference Range:  <14.0 ng/L- Negative Female for AMI  <22.0 ng/L- Negative Male for AMI  >=14 - Abnormal Female indicating possible myocardial injury.  >=22 - Abnormal Male indicating possible myocardial injury.   Clinicians would have to utilize clinical acumen, EKG, Troponin, and serial changes to determine if it is an Acute Myocardial Infarction or myocardial injury due to an underlying chronic condition.        RESPIRATORY PANEL PCR W/ COVID-19 (SARS-COV-2), NP SWAB IN UTM/VTP, 2 HR TAT - Normal    Narrative:     In the setting of a positive respiratory panel with a viral infection PLUS a negative procalcitonin without other underlying concern for bacterial infection, consider observing off antibiotics or discontinuation of antibiotics and continue supportive care. If the respiratory panel is positive for atypical bacterial infection (Bordetella pertussis, Chlamydophila pneumoniae, or Mycoplasma pneumoniae), consider antibiotic de-escalation to target atypical bacterial infection.   APTT - Normal   TROPONIN - Normal    Narrative:     High Sensitive Troponin T Reference Range:  <14.0 ng/L-  Negative Female for AMI  <22.0 ng/L- Negative Male for AMI  >=14 - Abnormal Female indicating possible myocardial injury.  >=22 - Abnormal Male indicating possible myocardial injury.   Clinicians would have to utilize clinical acumen, EKG, Troponin, and serial changes to determine if it is an Acute Myocardial Infarction or myocardial injury due to an underlying chronic condition.        LACTIC ACID, PLASMA - Normal   MAGNESIUM - Normal   BLOOD CULTURE   BLOOD CULTURE   URINALYSIS, MICROSCOPIC ONLY   BASIC METABOLIC PANEL   CBC (NO DIFF)   HEMOGLOBIN A1C   CBC AND DIFFERENTIAL    Narrative:     The following orders were created for panel order CBC & Differential.  Procedure                               Abnormality         Status                     ---------                               -----------         ------                     CBC Auto Differential[[id number]]        Abnormal            Final result                 Please view results for these tests on the individual orders.       EKG:   ECG 12 Lead Dyspnea   Preliminary Result   HEART RATE= 94  bpm   RR Interval= 638  ms   VA Interval= 154  ms   P Horizontal Axis= 10  deg   P Front Axis= 38  deg   QRSD Interval= 88  ms   QT Interval= 351  ms   QTcB= 439  ms   QRS Axis= -76  deg   T Wave Axis= 0  deg   - ABNORMAL ECG -   Sinus rhythm   Inferior infarct, old   Electronically Signed By:    Date and Time of Study: 2024-05-17 23:17:16          Meds given in ED:   Medications   sodium chloride 0.9 % flush 10 mL (has no administration in time range)   sodium chloride 0.9 % flush 10 mL (has no administration in time range)   sodium chloride 0.9 % flush 10 mL (has no administration in time range)   sodium chloride 0.9 % infusion 40 mL (has no administration in time range)   acetaminophen (TYLENOL) tablet 650 mg (has no administration in time range)     Or   acetaminophen (TYLENOL) 160 MG/5ML oral solution 650 mg (has no administration in time range)     Or  discomfort with palpation over the ankle, anterior foot or distal lower leg. DP/PT pulses 3+. DIAGNOSTIC RESULTS     RADIOLOGY:   Non-plain film images such as CT, Ultrasound and MRI are read by the radiologist. Plain radiographic images are visualized and preliminarily interpreted by the emergency physician with the below findings:    Interpretation per the Radiologist below, if available at the time of this note:    XR CALCANEUS RIGHT (MIN 2 VIEWS)   Final Result   1. Mild plantar calcaneal spur. No acute osseous abnormality identified. 2. Degenerative changes as detailed above. 3. Soft tissue calcification in the heel fat pad. LABS:  Labs Reviewed - No data to display    All other labs were within normal range or not returned as of this dictation. EMERGENCY DEPARTMENT COURSE and DIFFERENTIAL DIAGNOSIS/MDM:   Vitals:    Vitals:    18 1338   BP: (!) 182/75   Pulse: 74   Resp: 18   Temp: 97.6 °F (36.4 °C)   TempSrc: Oral   SpO2: 98%   Weight: 180 lb (81.6 kg)   Height: 5' 10\" (1.778 m)       Medical Decision Makin-year-old male with right heel pain. X-ray was negative for fracture but was positive for calcaneal spur. The patient has been advised to be nonweightbearing for the next 3-4 days. He has been given a prescription for walker. He has been advised of plantar fasciitis and has been encouraged to utilize a frozen can of juice for comfort. Exercises for plantar fasciitis were discussed. He has been referred to podiatry. His daughters at bedside to take him home. CONSULTS:  None    PROCEDURES:  None    FINAL IMPRESSION      1. Plantar fasciitis of right foot    2.  Contusion of heel, right, initial encounter          DISPOSITION/PLAN   DISPOSITION Decision To Discharge 2018 03:21:14 PM      PATIENT REFERRED TO:   Jamari Dickinson MD  [address] [place] [id number] [phone]    Call in 1 day  Make an appointment for next week for follow   acetaminophen (TYLENOL) suppository 650 mg (has no administration in time range)   sennosides-docusate (PERICOLACE) 8.6-50 MG per tablet 2 tablet (has no administration in time range)     And   polyethylene glycol (MIRALAX) packet 17 g (has no administration in time range)     And   bisacodyl (DULCOLAX) EC tablet 5 mg (has no administration in time range)     And   bisacodyl (DULCOLAX) suppository 10 mg (has no administration in time range)   calcium carbonate (TUMS) chewable tablet 500 mg (200 mg elemental) (has no administration in time range)   sodium chloride 0.9 % infusion (has no administration in time range)   Pharmacy Consult - Pharmacy to dose (has no administration in time range)   sodium chloride 0.9 % bolus 1,000 mL (0 mL Intravenous Stopped 5/18/24 0008)   morphine injection 2 mg (2 mg Intravenous Given 5/17/24 2330)   ondansetron (ZOFRAN) injection 4 mg (4 mg Intravenous Given 5/17/24 2330)   cefepime 2000 mg IVPB in 100 mL NS (MBP) (0 mg Intravenous Stopped 5/18/24 0144)   iopamidol (ISOVUE-300) 61 % injection 100 mL (85 mL Intravenous Given 5/18/24 0023)       Imaging results:  CT Head Without Contrast    Result Date: 5/18/2024  Electronically signed by Luz Maria Wild MD on 05-18-24 at 0157    CT Soft Tissue Neck With Contrast    Result Date: 5/18/2024  Electronically signed by Luz Maria Wild MD on 05-18-24 at 0149    XR Chest 1 View    Result Date: 5/18/2024  Electronically signed by Luz Maria Wild MD on 05-18-24 at 0121     Ambulatory status:   - ad omayra    Social issues:   Social History     Socioeconomic History    Marital status: Single       Peripheral Neurovascular  Peripheral Neurovascular (Adult)  Peripheral Neurovascular WDL: WDL    Neuro Cognitive  Neuro Cognitive (Adult)  Cognitive/Neuro/Behavioral WDL: WDL    Learning  Learning Assessment (Adult)  Learning Readiness and Ability: no barriers identified    Respiratory  Respiratory (Adult)  Airway WDL: WDL  Respiratory WDL  Respiratory  WDL: WDL    Abdominal Pain       Pain Assessments  Pain (Adult)  (0-10) Pain Rating: Rest: 10  (0-10) Pain Rating: Activity: 10  Pain Location: neck  Pain Side/Orientation: generalized  Pain Description: constant, aching    NIH Stroke Scale       J Carlos Hooks RN  05/18/24 03:37 EDT

## 2024-05-31 ENCOUNTER — OFFICE VISIT (OUTPATIENT)
Dept: FAMILY MEDICINE CLINIC | Age: 84
End: 2024-05-31
Payer: MEDICARE

## 2024-05-31 VITALS
TEMPERATURE: 98.9 F | BODY MASS INDEX: 26.98 KG/M2 | DIASTOLIC BLOOD PRESSURE: 75 MMHG | HEIGHT: 68 IN | HEART RATE: 89 BPM | SYSTOLIC BLOOD PRESSURE: 137 MMHG | OXYGEN SATURATION: 95 % | WEIGHT: 178 LBS

## 2024-05-31 DIAGNOSIS — J06.9 ACUTE URI: Primary | ICD-10-CM

## 2024-05-31 PROCEDURE — 1123F ACP DISCUSS/DSCN MKR DOCD: CPT | Performed by: FAMILY MEDICINE

## 2024-05-31 PROCEDURE — 99213 OFFICE O/P EST LOW 20 MIN: CPT | Performed by: FAMILY MEDICINE

## 2024-05-31 PROCEDURE — G8417 CALC BMI ABV UP PARAM F/U: HCPCS | Performed by: FAMILY MEDICINE

## 2024-05-31 PROCEDURE — 1036F TOBACCO NON-USER: CPT | Performed by: FAMILY MEDICINE

## 2024-05-31 PROCEDURE — 3078F DIAST BP <80 MM HG: CPT | Performed by: FAMILY MEDICINE

## 2024-05-31 PROCEDURE — G8427 DOCREV CUR MEDS BY ELIG CLIN: HCPCS | Performed by: FAMILY MEDICINE

## 2024-05-31 PROCEDURE — 3075F SYST BP GE 130 - 139MM HG: CPT | Performed by: FAMILY MEDICINE

## 2024-05-31 RX ORDER — DOXYCYCLINE HYCLATE 100 MG
100 TABLET ORAL 2 TIMES DAILY
Qty: 20 TABLET | Refills: 0 | Status: SHIPPED | OUTPATIENT
Start: 2024-05-31 | End: 2024-06-10

## 2024-05-31 RX ORDER — BENZONATATE 200 MG/1
200 CAPSULE ORAL 3 TIMES DAILY PRN
Qty: 30 CAPSULE | Refills: 0 | Status: SHIPPED | OUTPATIENT
Start: 2024-05-31 | End: 2024-06-07

## 2024-05-31 ASSESSMENT — PATIENT HEALTH QUESTIONNAIRE - PHQ9
SUM OF ALL RESPONSES TO PHQ9 QUESTIONS 1 & 2: 0
1. LITTLE INTEREST OR PLEASURE IN DOING THINGS: NOT AT ALL
SUM OF ALL RESPONSES TO PHQ QUESTIONS 1-9: 0
SUM OF ALL RESPONSES TO PHQ QUESTIONS 1-9: 0
DEPRESSION UNABLE TO ASSESS: PT REFUSES
SUM OF ALL RESPONSES TO PHQ QUESTIONS 1-9: 0
SUM OF ALL RESPONSES TO PHQ QUESTIONS 1-9: 0
2. FEELING DOWN, DEPRESSED OR HOPELESS: NOT AT ALL

## 2024-05-31 NOTE — PROGRESS NOTES
Glucose   Date Value   04/19/2024 191 mg/dL (H)   06/12/2023 159 MG/DL (H)     Hemoglobin A1C (%)   Date Value   03/13/2024 6.6 (H)              Subjective:      HPI  He is being seen today complaining of a deep cough with green phlegm going on for last couple days    Review of Systems:     Constitutional: Negative for fever, appetite change and fatigue.        Family social and medical history reviewed and unchanged     HENT: Negative.  Negative for nosebleeds, trouble swallowing and neck pain.    Eyes: Negative for photophobia and visual disturbance.   Respiratory: Negative.  Negative for chest tightness and shortness of breath.    Cardiovascular: Negative.  Negative for chest pain and leg swelling.   Gastrointestinal: Negative.  Negative for abdominal pain and blood in stool.   Endocrine: Negative for cold intolerance and polyuria.   Genitourinary: Negative for dysuria and hematuria.   Musculoskeletal: Negative.    Skin: Negative for rash.   Allergic/Immunologic: Negative.    Neurological: Negative.  Negative for dizziness, weakness and numbness.   Hematological: Negative.  Negative for adenopathy. Does not bruise/bleed easily.   Psychiatric/Behavioral: Negative for sleep disturbance, dysphoric mood and  decreased concentration. The patient is not nervous/anxious.        Objective:     Physical Exam:     Nursing note and vitals reviewed.  /75   Pulse 89   Temp 98.9 °F (37.2 °C)   Ht 1.727 m (5' 8\")   Wt 80.7 kg (178 lb)   SpO2 95%   BMI 27.06 kg/m²   Constitutional: He is oriented to person, place, and time. He   appears well-developed and well-nourished.  HENT:   Head: Normocephalic and atraumatic.    Right Ear: External ear normal. Tympanic membrane is not erythematous. No middle ear effusion.    Left Ear: External ear normal. Tympanic membrane is not erythematous.  No middle ear effusion.   Nose: No mucosal edema.   Mouth/Throat: Oropharynx is clear and moist. No posterior oropharyngeal

## 2024-06-01 ENCOUNTER — APPOINTMENT (OUTPATIENT)
Dept: GENERAL RADIOLOGY | Age: 84
End: 2024-06-01
Payer: MEDICARE

## 2024-06-01 ENCOUNTER — HOSPITAL ENCOUNTER (EMERGENCY)
Age: 84
Discharge: HOME | End: 2024-06-02
Attending: EMERGENCY MEDICINE
Payer: MEDICARE

## 2024-06-01 VITALS
SYSTOLIC BLOOD PRESSURE: 164 MMHG | OXYGEN SATURATION: 94 % | DIASTOLIC BLOOD PRESSURE: 79 MMHG | HEART RATE: 87 BPM | TEMPERATURE: 97.9 F | RESPIRATION RATE: 16 BRPM

## 2024-06-01 DIAGNOSIS — M25.512 ACUTE PAIN OF LEFT SHOULDER: Primary | ICD-10-CM

## 2024-06-01 DIAGNOSIS — M79.671 RIGHT FOOT PAIN: ICD-10-CM

## 2024-06-01 DIAGNOSIS — M10.9 ACUTE GOUT OF MULTIPLE SITES, UNSPECIFIED CAUSE: ICD-10-CM

## 2024-06-01 LAB
ANION GAP SERPL CALCULATED.3IONS-SCNC: 16 MMOL/L (ref 9–17)
BASOPHILS # BLD: 0.07 K/UL (ref 0–0.2)
BASOPHILS NFR BLD: 1 % (ref 0–2)
BUN SERPL-MCNC: 33 MG/DL (ref 8–23)
BUN/CREAT SERPL: 25 (ref 9–20)
CALCIUM SERPL-MCNC: 9.5 MG/DL (ref 8.6–10.4)
CHLORIDE SERPL-SCNC: 102 MMOL/L (ref 98–107)
CO2 SERPL-SCNC: 22 MMOL/L (ref 20–31)
CREAT SERPL-MCNC: 1.3 MG/DL (ref 0.7–1.2)
EOSINOPHIL # BLD: 0.43 K/UL (ref 0–0.44)
EOSINOPHILS RELATIVE PERCENT: 3 % (ref 1–4)
ERYTHROCYTE [DISTWIDTH] IN BLOOD BY AUTOMATED COUNT: 13.5 % (ref 11.8–14.4)
GFR, ESTIMATED: 54 ML/MIN/1.73M2
GLUCOSE SERPL-MCNC: 112 MG/DL (ref 70–99)
HCT VFR BLD AUTO: 37.7 % (ref 40.7–50.3)
HGB BLD-MCNC: 12.2 G/DL (ref 13–17)
IMM GRANULOCYTES # BLD AUTO: 0.24 K/UL (ref 0–0.3)
IMM GRANULOCYTES NFR BLD: 2 %
INR PPP: 1
LYMPHOCYTES NFR BLD: 1.84 K/UL (ref 1.1–3.7)
LYMPHOCYTES RELATIVE PERCENT: 13 % (ref 24–43)
MAGNESIUM SERPL-MCNC: 1.6 MG/DL (ref 1.6–2.6)
MCH RBC QN AUTO: 30.3 PG (ref 25.2–33.5)
MCHC RBC AUTO-ENTMCNC: 32.4 G/DL (ref 28.4–34.8)
MCV RBC AUTO: 93.5 FL (ref 82.6–102.9)
MONOCYTES NFR BLD: 1.1 K/UL (ref 0.1–1.2)
MONOCYTES NFR BLD: 8 % (ref 3–12)
NEUTROPHILS NFR BLD: 73 % (ref 36–65)
NEUTS SEG NFR BLD: 10.49 K/UL (ref 1.5–8.1)
NRBC BLD-RTO: 0 PER 100 WBC
PARTIAL THROMBOPLASTIN TIME: 26.5 SEC (ref 23.9–33.8)
PHOSPHATE SERPL-MCNC: 2.8 MG/DL (ref 2.5–4.5)
PLATELET # BLD AUTO: 316 K/UL (ref 138–453)
PMV BLD AUTO: 9.2 FL (ref 8.1–13.5)
POTASSIUM SERPL-SCNC: 4.1 MMOL/L (ref 3.7–5.3)
PROTHROMBIN TIME: 13.1 SEC (ref 11.5–14.2)
RBC # BLD AUTO: 4.03 M/UL (ref 4.21–5.77)
SODIUM SERPL-SCNC: 140 MMOL/L (ref 135–144)
TROPONIN I SERPL HS-MCNC: 26 NG/L (ref 0–22)
WBC OTHER # BLD: 14.2 K/UL (ref 3.5–11.3)

## 2024-06-01 PROCEDURE — 85025 COMPLETE CBC W/AUTO DIFF WBC: CPT

## 2024-06-01 PROCEDURE — 96374 THER/PROPH/DIAG INJ IV PUSH: CPT

## 2024-06-01 PROCEDURE — 84100 ASSAY OF PHOSPHORUS: CPT

## 2024-06-01 PROCEDURE — 71045 X-RAY EXAM CHEST 1 VIEW: CPT

## 2024-06-01 PROCEDURE — 93005 ELECTROCARDIOGRAM TRACING: CPT | Performed by: EMERGENCY MEDICINE

## 2024-06-01 PROCEDURE — 83735 ASSAY OF MAGNESIUM: CPT

## 2024-06-01 PROCEDURE — 6360000002 HC RX W HCPCS: Performed by: EMERGENCY MEDICINE

## 2024-06-01 PROCEDURE — 84484 ASSAY OF TROPONIN QUANT: CPT

## 2024-06-01 PROCEDURE — 85730 THROMBOPLASTIN TIME PARTIAL: CPT

## 2024-06-01 PROCEDURE — 80048 BASIC METABOLIC PNL TOTAL CA: CPT

## 2024-06-01 PROCEDURE — 85610 PROTHROMBIN TIME: CPT

## 2024-06-01 PROCEDURE — 99285 EMERGENCY DEPT VISIT HI MDM: CPT

## 2024-06-01 PROCEDURE — 73030 X-RAY EXAM OF SHOULDER: CPT

## 2024-06-01 RX ORDER — MORPHINE SULFATE 2 MG/ML
2 INJECTION, SOLUTION INTRAMUSCULAR; INTRAVENOUS ONCE
Status: COMPLETED | OUTPATIENT
Start: 2024-06-01 | End: 2024-06-01

## 2024-06-01 RX ORDER — INDOMETHACIN 25 MG/1
25 CAPSULE ORAL ONCE
Status: COMPLETED | OUTPATIENT
Start: 2024-06-02 | End: 2024-06-02

## 2024-06-01 RX ADMIN — MORPHINE SULFATE 2 MG: 2 INJECTION, SOLUTION INTRAMUSCULAR; INTRAVENOUS at 23:19

## 2024-06-01 ASSESSMENT — PAIN DESCRIPTION - LOCATION: LOCATION: SHOULDER;KNEE;FOOT

## 2024-06-01 ASSESSMENT — PAIN DESCRIPTION - DESCRIPTORS: DESCRIPTORS: DISCOMFORT;THROBBING

## 2024-06-01 ASSESSMENT — PAIN SCALES - GENERAL: PAINLEVEL_OUTOF10: 10

## 2024-06-01 ASSESSMENT — PAIN DESCRIPTION - ORIENTATION: ORIENTATION: LEFT;RIGHT

## 2024-06-02 LAB — TROPONIN I SERPL HS-MCNC: 26 NG/L (ref 0–22)

## 2024-06-02 PROCEDURE — 6370000000 HC RX 637 (ALT 250 FOR IP): Performed by: EMERGENCY MEDICINE

## 2024-06-02 PROCEDURE — 84484 ASSAY OF TROPONIN QUANT: CPT

## 2024-06-02 RX ORDER — CYCLOBENZAPRINE HCL 10 MG
10 TABLET ORAL 3 TIMES DAILY PRN
Qty: 21 TABLET | Refills: 0 | Status: SHIPPED | OUTPATIENT
Start: 2024-06-02 | End: 2024-06-12

## 2024-06-02 RX ORDER — CYCLOBENZAPRINE HCL 10 MG
10 TABLET ORAL ONCE
Status: COMPLETED | OUTPATIENT
Start: 2024-06-02 | End: 2024-06-02

## 2024-06-02 RX ORDER — INDOMETHACIN 50 MG/1
50 CAPSULE ORAL 2 TIMES DAILY WITH MEALS
Qty: 8 CAPSULE | Refills: 0 | Status: SHIPPED | OUTPATIENT
Start: 2024-06-02

## 2024-06-02 RX ADMIN — INDOMETHACIN 25 MG: 25 CAPSULE ORAL at 00:12

## 2024-06-02 RX ADMIN — CYCLOBENZAPRINE 10 MG: 10 TABLET, FILM COATED ORAL at 01:14

## 2024-06-02 ASSESSMENT — PAIN SCALES - GENERAL
PAINLEVEL_OUTOF10: 10
PAINLEVEL_OUTOF10: 7

## 2024-06-02 ASSESSMENT — HEART SCORE: ECG: NORMAL

## 2024-06-02 ASSESSMENT — PAIN DESCRIPTION - DESCRIPTORS
DESCRIPTORS: DISCOMFORT
DESCRIPTORS: DISCOMFORT;THROBBING

## 2024-06-02 NOTE — ED PROVIDER NOTES
ED  [address] [facility] [address]  [phone]  Go to   As needed, If symptoms worsen    Yong Valdes MD  [facility] [address]  [place] [id number]  [phone]    Schedule an appointment as soon as possible for a visit in 2 days        DO Haylee Perry Sami, DO  06/02/24 0101

## 2024-06-03 ENCOUNTER — HOSPITAL ENCOUNTER (OUTPATIENT)
Age: 84
Discharge: HOME | End: 2024-06-03
Payer: MEDICARE

## 2024-06-03 ENCOUNTER — TELEPHONE (OUTPATIENT)
Dept: FAMILY MEDICINE CLINIC | Age: 84
End: 2024-06-03

## 2024-06-03 DIAGNOSIS — M79.671 RIGHT FOOT PAIN: ICD-10-CM

## 2024-06-03 DIAGNOSIS — M79.671 RIGHT FOOT PAIN: Primary | ICD-10-CM

## 2024-06-03 LAB
EKG ATRIAL RATE: 85 BPM
EKG P AXIS: 39 DEGREES
EKG Q-T INTERVAL: 412 MS
EKG QRS DURATION: 138 MS
EKG QTC CALCULATION (BAZETT): 490 MS
EKG R AXIS: -53 DEGREES
EKG T AXIS: 30 DEGREES
EKG VENTRICULAR RATE: 85 BPM
URATE SERPL-MCNC: 5.6 MG/DL (ref 3.4–7)

## 2024-06-03 PROCEDURE — 36415 COLL VENOUS BLD VENIPUNCTURE: CPT

## 2024-06-03 PROCEDURE — 84550 ASSAY OF BLOOD/URIC ACID: CPT

## 2024-06-03 RX ORDER — LISINOPRIL 10 MG/1
10 TABLET ORAL DAILY
Qty: 90 TABLET | Refills: 3 | Status: SHIPPED | OUTPATIENT
Start: 2024-06-03

## 2024-06-03 RX ORDER — PREDNISONE 20 MG/1
TABLET ORAL
Qty: 12 TABLET | Refills: 0 | Status: SHIPPED | OUTPATIENT
Start: 2024-06-03

## 2024-06-03 NOTE — TELEPHONE ENCOUNTER
Will guess what they might have done 10 rounds of blood but they did not diagnose him correctly and they did not draw anything to diagnose gout so she can be is demanding if she wants but I am not can to give him allopurinol or colchicine until I find out for sure what he is actually got given that he can destroy his kidneys so they like me to give him the medicine that treats gout he has  to go back to the lab and get an actual test to diagnose

## 2024-06-03 NOTE — TELEPHONE ENCOUNTER
Patients wife stated they took two rounds of blood test on him and dx him with gout and the patient wants you to prescribed the allopurinol and colchicine to stop him from having gout because she mentioned he had gout in his hand and he took some colchicine he had at home and it went away he does not want a steroid        Please advise

## 2024-06-03 NOTE — TELEPHONE ENCOUNTER
They never ran a lab test to diagnose gout or did an x-ray.  Your x-ray of the chest does show he has pneumonia so would be more likely that shoulder pain is coming from the fact that he has pneumonia.  Given that he has abnormal renal function he would not want to start these meds that we do use to treat gout unless he in fact has gout based on an elevated uric acid level.  So he needs to unfortunately go back to the lab to get the uric acid level done.  In the interim I can start him on some steroids which will not only help the pneumonia but if he does have gout will help settle that down as well

## 2024-06-03 NOTE — TELEPHONE ENCOUNTER
Patients wife called in stating over the weekend the patient went tot the ER due to him having pain in his foot as well as his shoulder they dx him with having gout in his shoulder as well and he also has pneumonia they suggested that he gets prescribed the following medications for gout the feel he might need to be on a abx they gave him indomethacin for pain      Allopurinol and colchicine to stop having gout      Please advise

## 2024-06-03 NOTE — TELEPHONE ENCOUNTER
Greg Flores is calling to request a refill on the following medication(s):    Last Visit Date (If Applicable):  5/31/2024    Next Visit Date:    Visit date not found    Medication Request:  Requested Prescriptions     Pending Prescriptions Disp Refills    lisinopril (PRINIVIL;ZESTRIL) 10 MG tablet [Pharmacy Med Name: LISINOPRIL 10 MG TABLET] 90 tablet 0     Sig: TAKE 1 TABLET BY MOUTH DAILY

## 2024-06-06 NOTE — ED NOTES
Pt arrived to ed with c/o pain to left shoulder, right foot and right knee. Pt denies any new injury or trauma. Pt states history of Gout. Pt states taking \"some\" Gout medications. No visible deformity or visible signs of injury to left shoulder, right knee or right foot. Pt A&Ox4.

## 2024-06-11 ENCOUNTER — TELEPHONE (OUTPATIENT)
Dept: FAMILY MEDICINE CLINIC | Age: 84
End: 2024-06-11

## 2024-06-11 NOTE — TELEPHONE ENCOUNTER
Unfortunately we cannot place orders for him to be directly admitted into the hospital.  But it does sound like may be with all of these things going  That he should just go to the emergency room

## 2024-06-11 NOTE — TELEPHONE ENCOUNTER
Greg Flores was contacted as a part of Medicare Annual Wellness Visit outreach.    AWV has been scheduled.

## 2024-06-11 NOTE — TELEPHONE ENCOUNTER
I called and spoke with daughter that Dr. Renteria wants patient to go to the ER she explained that patient is refusing to go to the hospital. Daughter wanted me to call patient to explain what Dr. Renteria wanted him to do no answer LVM to contact office. I called wife she was not at home at the time.

## 2024-06-11 NOTE — TELEPHONE ENCOUNTER
Patients daughter called patient does not want to be seen but is requesting orders to be placed so he can go to the hospital. He is dealing with odd boats of inflammation. On 6/1 has shoulder pain and swelling. Then his knee opposite of the one he received surgery on is also swollen  front to the back and is in pain. Is also having lower perineum swelling to the point that when peeing its just dribbles and stated \" not narayan able to see his penis when urinating\". Also the hernia he has is swollen and looks like its getting bigger. Please advise.         Bailey Floyd () [phone] (Home)

## 2024-06-13 RX ORDER — TAMSULOSIN HYDROCHLORIDE 0.4 MG/1
0.4 CAPSULE ORAL DAILY
Qty: 30 CAPSULE | Refills: 3 | Status: SHIPPED | OUTPATIENT
Start: 2024-06-13

## 2024-06-13 RX ORDER — SEMAGLUTIDE 0.68 MG/ML
INJECTION, SOLUTION SUBCUTANEOUS
Qty: 3 ML | Refills: 0 | Status: SHIPPED | OUTPATIENT
Start: 2024-06-13

## 2024-06-13 NOTE — TELEPHONE ENCOUNTER
Greg Flores is calling to request a refill on the following medication(s):    Last Visit Date (If Applicable):  5/31/2024    Next Visit Date:    9/4/2024    Medication Request:  Requested Prescriptions     Pending Prescriptions Disp Refills    tamsulosin (FLOMAX) 0.4 MG capsule 30 capsule 3     Sig: Take 1 capsule by mouth daily

## 2024-06-13 NOTE — TELEPHONE ENCOUNTER
Greg Flores is calling to request a refill on the following medication(s):    Last Visit Date (If Applicable):  5/31/2024    Next Visit Date:    9/4/2024    Medication Request:  Requested Prescriptions     Pending Prescriptions Disp Refills    OZEMPIC, 0.25 OR 0.5 MG/DOSE, 2 MG/3ML SOPN [Pharmacy Med Name: OZEMPIC 0.25-0.5 MG/DOSE(2 MG/3 ML)] 3 mL 0     Sig: DIAL AND INJECT UNDER THE SKIN 0.25 MG WEEKLY FOR 4 WEEKS THEN DIAL AND INJECT 0.5 MG WEEKLY THEREAFTER

## 2024-06-25 ENCOUNTER — APPOINTMENT (OUTPATIENT)
Dept: CT IMAGING | Age: 84
End: 2024-06-25
Payer: MEDICARE

## 2024-06-25 ENCOUNTER — APPOINTMENT (OUTPATIENT)
Dept: GENERAL RADIOLOGY | Age: 84
End: 2024-06-25
Payer: MEDICARE

## 2024-06-25 ENCOUNTER — HOSPITAL ENCOUNTER (EMERGENCY)
Age: 84
Discharge: HOME | End: 2024-06-25
Attending: EMERGENCY MEDICINE
Payer: MEDICARE

## 2024-06-25 VITALS
DIASTOLIC BLOOD PRESSURE: 59 MMHG | HEIGHT: 68 IN | HEART RATE: 85 BPM | WEIGHT: 176.59 LBS | SYSTOLIC BLOOD PRESSURE: 119 MMHG | OXYGEN SATURATION: 95 % | RESPIRATION RATE: 20 BRPM | BODY MASS INDEX: 26.76 KG/M2 | TEMPERATURE: 98.1 F

## 2024-06-25 DIAGNOSIS — M25.511 ACUTE PAIN OF RIGHT SHOULDER: Primary | ICD-10-CM
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
## 2024-06-25 LAB
ALBUMIN SERPL-MCNC: 3.8 G/DL (ref 3.5–5.2)
ALBUMIN/GLOB SERPL: 1 {RATIO} (ref 1–2.5)
ALP SERPL-CCNC: 93 U/L (ref 40–129)
ALT SERPL-CCNC: <5 U/L (ref 10–50)
ANION GAP SERPL CALCULATED.3IONS-SCNC: 12 MMOL/L (ref 9–16)
AST SERPL-CCNC: 11 U/L (ref 10–50)
BACTERIA URNS QL MICRO: NORMAL
BASOPHILS # BLD: 0.06 K/UL (ref 0–0.2)
BASOPHILS NFR BLD: 1 % (ref 0–2)
BILIRUB SERPL-MCNC: 0.4 MG/DL (ref 0–1.2)
BILIRUB UR QL STRIP: NEGATIVE
BUN SERPL-MCNC: 26 MG/DL (ref 8–23)
CALCIUM SERPL-MCNC: 9.2 MG/DL (ref 8.6–10.4)
CASTS #/AREA URNS LPF: NORMAL /LPF (ref 0–8)
CHLORIDE SERPL-SCNC: 99 MMOL/L (ref 98–107)
CLARITY UR: CLEAR
CO2 SERPL-SCNC: 23 MMOL/L (ref 20–31)
COLOR UR: YELLOW
CREAT SERPL-MCNC: 1.1 MG/DL (ref 0.7–1.2)
EOSINOPHIL # BLD: 0.21 K/UL (ref 0–0.44)
EOSINOPHILS RELATIVE PERCENT: 2 % (ref 1–4)
EPI CELLS #/AREA URNS HPF: NORMAL /HPF (ref 0–5)
ERYTHROCYTE [DISTWIDTH] IN BLOOD BY AUTOMATED COUNT: 13.5 % (ref 11.8–14.4)
GFR, ESTIMATED: 67 ML/MIN/1.73M2
GLUCOSE SERPL-MCNC: 201 MG/DL (ref 74–99)
GLUCOSE UR STRIP-MCNC: ABNORMAL MG/DL
HCT VFR BLD AUTO: 33.8 % (ref 40.7–50.3)
HGB BLD-MCNC: 10.7 G/DL (ref 13–17)
HGB UR QL STRIP.AUTO: NEGATIVE
IMM GRANULOCYTES # BLD AUTO: 0.2 K/UL (ref 0–0.3)
IMM GRANULOCYTES NFR BLD: 2 %
KETONES UR STRIP-MCNC: NEGATIVE MG/DL
LEUKOCYTE ESTERASE UR QL STRIP: NEGATIVE
LIPASE SERPL-CCNC: 14 U/L (ref 13–60)
LYMPHOCYTES NFR BLD: 1.62 K/UL (ref 1.1–3.7)
LYMPHOCYTES RELATIVE PERCENT: 13 % (ref 24–43)
MCH RBC QN AUTO: 29.4 PG (ref 25.2–33.5)
MCHC RBC AUTO-ENTMCNC: 31.7 G/DL (ref 28.4–34.8)
MCV RBC AUTO: 92.9 FL (ref 82.6–102.9)
MONOCYTES NFR BLD: 0.96 K/UL (ref 0.1–1.2)
MONOCYTES NFR BLD: 8 % (ref 3–12)
NEUTROPHILS NFR BLD: 74 % (ref 36–65)
NEUTS SEG NFR BLD: 9.39 K/UL (ref 1.5–8.1)
NITRITE UR QL STRIP: NEGATIVE
NRBC BLD-RTO: 0 PER 100 WBC
PH UR STRIP: 6 [PH] (ref 5–8)
PLATELET # BLD AUTO: 264 K/UL (ref 138–453)
PMV BLD AUTO: 10 FL (ref 8.1–13.5)
POTASSIUM SERPL-SCNC: 4 MMOL/L (ref 3.7–5.3)
PROT SERPL-MCNC: 6.8 G/DL (ref 6.6–8.7)
PROT UR STRIP-MCNC: ABNORMAL MG/DL
RBC # BLD AUTO: 3.64 M/UL (ref 4.21–5.77)
RBC #/AREA URNS HPF: NORMAL /HPF (ref 0–4)
SODIUM SERPL-SCNC: 134 MMOL/L (ref 136–145)
SP GR UR STRIP: 1.01 (ref 1–1.03)
TROPONIN I SERPL HS-MCNC: 27 NG/L (ref 0–22)
TROPONIN I SERPL HS-MCNC: 29 NG/L (ref 0–22)
UROBILINOGEN UR STRIP-ACNC: NORMAL EU/DL (ref 0–1)
WBC #/AREA URNS HPF: NORMAL /HPF (ref 0–5)
WBC OTHER # BLD: 12.4 K/UL (ref 3.5–11.3)

## 2024-06-25 PROCEDURE — 99285 EMERGENCY DEPT VISIT HI MDM: CPT | Performed by: EMERGENCY MEDICINE

## 2024-06-25 PROCEDURE — 73030 X-RAY EXAM OF SHOULDER: CPT

## 2024-06-25 PROCEDURE — 70450 CT HEAD/BRAIN W/O DYE: CPT

## 2024-06-25 PROCEDURE — 96376 TX/PRO/DX INJ SAME DRUG ADON: CPT | Performed by: EMERGENCY MEDICINE

## 2024-06-25 PROCEDURE — 93005 ELECTROCARDIOGRAM TRACING: CPT

## 2024-06-25 PROCEDURE — 6360000004 HC RX CONTRAST MEDICATION

## 2024-06-25 PROCEDURE — 72125 CT NECK SPINE W/O DYE: CPT

## 2024-06-25 PROCEDURE — 6370000000 HC RX 637 (ALT 250 FOR IP)

## 2024-06-25 PROCEDURE — 71260 CT THORAX DX C+: CPT

## 2024-06-25 PROCEDURE — 84484 ASSAY OF TROPONIN QUANT: CPT

## 2024-06-25 PROCEDURE — 81001 URINALYSIS AUTO W/SCOPE: CPT

## 2024-06-25 PROCEDURE — 6360000002 HC RX W HCPCS

## 2024-06-25 PROCEDURE — 80053 COMPREHEN METABOLIC PANEL: CPT

## 2024-06-25 PROCEDURE — 83690 ASSAY OF LIPASE: CPT

## 2024-06-25 PROCEDURE — 96374 THER/PROPH/DIAG INJ IV PUSH: CPT | Performed by: EMERGENCY MEDICINE

## 2024-06-25 PROCEDURE — 85025 COMPLETE CBC W/AUTO DIFF WBC: CPT

## 2024-06-25 RX ORDER — COLCHICINE 0.6 MG/1
0.6 TABLET ORAL DAILY
Qty: 30 TABLET | Refills: 3 | Status: SHIPPED | OUTPATIENT
Start: 2024-06-25

## 2024-06-25 RX ORDER — PREDNISONE 20 MG/1
20 TABLET ORAL DAILY
Qty: 5 TABLET | Refills: 0 | Status: SHIPPED | OUTPATIENT
Start: 2024-06-25 | End: 2024-06-30

## 2024-06-25 RX ORDER — CYCLOBENZAPRINE HCL 10 MG
10 TABLET ORAL ONCE
Status: COMPLETED | OUTPATIENT
Start: 2024-06-25 | End: 2024-06-25

## 2024-06-25 RX ORDER — CYCLOBENZAPRINE HCL 5 MG
5 TABLET ORAL 2 TIMES DAILY PRN
Qty: 10 TABLET | Refills: 0 | Status: SHIPPED | OUTPATIENT
Start: 2024-06-25 | End: 2024-07-05

## 2024-06-25 RX ORDER — MORPHINE SULFATE 4 MG/ML
4 INJECTION, SOLUTION INTRAMUSCULAR; INTRAVENOUS ONCE
Status: COMPLETED | OUTPATIENT
Start: 2024-06-25 | End: 2024-06-25

## 2024-06-25 RX ORDER — MAGNESIUM HYDROXIDE/ALUMINUM HYDROXICE/SIMETHICONE 120; 1200; 1200 MG/30ML; MG/30ML; MG/30ML
30 SUSPENSION ORAL
Status: COMPLETED | OUTPATIENT
Start: 2024-06-25 | End: 2024-06-25

## 2024-06-25 RX ORDER — MORPHINE SULFATE 4 MG/ML
2 INJECTION, SOLUTION INTRAMUSCULAR; INTRAVENOUS ONCE
Status: COMPLETED | OUTPATIENT
Start: 2024-06-25 | End: 2024-06-25

## 2024-06-25 RX ORDER — ACETAMINOPHEN 500 MG
1000 TABLET ORAL ONCE
Status: COMPLETED | OUTPATIENT
Start: 2024-06-25 | End: 2024-06-25

## 2024-06-25 RX ADMIN — ACETAMINOPHEN 1000 MG: 500 TABLET ORAL at 14:08

## 2024-06-25 RX ADMIN — MORPHINE SULFATE 4 MG: 4 INJECTION INTRAVENOUS at 16:28

## 2024-06-25 RX ADMIN — ALUMINUM HYDROXIDE, MAGNESIUM HYDROXIDE, AND SIMETHICONE 30 ML: 1200; 120; 1200 SUSPENSION ORAL at 16:28

## 2024-06-25 RX ADMIN — IOPAMIDOL 75 ML: 755 INJECTION, SOLUTION INTRAVENOUS at 16:40

## 2024-06-25 RX ADMIN — MORPHINE SULFATE 2 MG: 4 INJECTION INTRAVENOUS at 14:10

## 2024-06-25 RX ADMIN — CYCLOBENZAPRINE 10 MG: 10 TABLET, FILM COATED ORAL at 14:08

## 2024-06-25 ASSESSMENT — PAIN SCALES - GENERAL: PAINLEVEL_OUTOF10: 10

## 2024-06-25 ASSESSMENT — PAIN DESCRIPTION - LOCATION: LOCATION: SHOULDER

## 2024-06-25 ASSESSMENT — PAIN - FUNCTIONAL ASSESSMENT: PAIN_FUNCTIONAL_ASSESSMENT: 0-10

## 2024-06-25 ASSESSMENT — PAIN DESCRIPTION - ORIENTATION: ORIENTATION: RIGHT

## 2024-06-25 NOTE — DISCHARGE INSTR - COC
Continuity of Care Form    Patient Name: Greg Flores   :  1940  MRN:  [id number]    Admit date:  2024  Discharge date:  ***    Code Status Order: Prior   Advance Directives:     Admitting Physician:  No admitting provider for patient encounter.  PCP: Belle Renteria DO    Discharging Nurse: ***  Discharging Hospital Unit/Room#:   Discharging Unit Phone Number: ***    Emergency Contact:   Extended Emergency Contact Information  Primary Emergency Contact: Lenora Flores  Address: [address] [place] [address]  Home Phone: [phone]  Relation: Spouse  Secondary Emergency Contact: Bailey Floyd  Home Phone: [phone]  Relation: Child  Preferred language: English    Past Surgical History:  Past Surgical History:   Procedure Laterality Date    APPENDECTOMY  age 18    BACK SURGERY      CARDIAC PROCEDURE N/A 2023    Coronary angiography performed by Cale Pantoja MD at [facility] CARDIAC CATH LAB    CARDIAC PROCEDURE N/A 2023    Aortic root aortogram performed by Cale Pantoja MD at [facility] CARDIAC CATH LAB    CARDIAC PROCEDURE N/A 2023    Intra-aortic balloon pump insertion performed by Cale Pantoja MD at [facility] CARDIAC CATH LAB    CAROTID ENDARTERECTOMY Right     2017    [facility]    CORONARY ANGIOPLASTY WITH STENT PLACEMENT      two occasions, one stent first, and then two the next time, Dr SCOTT Canseco    CORONARY ARTERY BYPASS GRAFT N/A 2023    CABG CORONARY ARTERY BYPASS X3 ON PUMP, KATHERINE performed by Josué Blackwell MD at [facility] CVOR    CORONARY ARTERY BYPASS GRAFT  2023    Per Dr. Andrews ([facility]) No MRI. Patient have retained epi-cardial leads from CABG. These are not MRI Safe/Conditional. - [facility]    EP DEVICE PROCEDURE N/A 2023    m avni / ppm / rm 1012 performed by Cale Cantrell DO at [facility] CARDIAC CATH LAB    PACEMAKER INSERTION Left 2023    Medtronic [place] XT  MRI  Serial #: [id number] Model: [id number] RA Lead (5076-52) RV Lead

## 2024-06-25 NOTE — DISCHARGE INSTRUCTIONS
You were seen today for shoulder pain and a fall.  You had a CT scan of your head, neck, and chest that were negative for fractures, dislocations, clots or any other pathology. Your lab work was normal. You had Xrays of your shoulders that show osteoarthritis. You Have been prescribed colchicine and flexeril. I recommend tylenol and motrin for pain.      Do not drive or operate heavy machinery while taking muscle relaxers or narcotics as they can cause drowsiness.       If you notice any concerning symptoms please return to the ER immediately. These can include but are not limited to: fevers, chills, shortness of breath, vomiting, weakness of the extremities, changes in your mental status, numbness, pale extremities, or chest pain.     Wound care: none    Diet: You may resume your normal diet     Activity: resume activity as tolerated.     Medications: Continue taking your home medications as previously directed. For pain You may take tylenol 1,000mg by mouth every 6 hours as needed for pain. Do not exceed 4,000mg per day. If you have liver disease don't take tylenol. You may also take ibuprofen 600mg every 6-8 hours as needed for pain. Do not exceed 2,400 mg per day. If you experience stomach pain or you have a history of kidney disease stop taking ibuprofen. You may alternate application of ice and heat 20 minutes at a time as desired.      Follow up: Please follow up with your primary care doctor as soon as possible

## 2024-06-25 NOTE — ED PROVIDER NOTES
[facility]  Emergency Department  Faculty Attestation     I performed a history and physical examination of the patient and discussed management with the resident. I reviewed the resident’s note and agree with the documented findings and plan of care. Any areas of disagreement are noted on the chart. I was personally present for the key portions of any procedures. I have documented in the chart those procedures where I was not present during the key portions. I have reviewed the emergency nurses triage note. I agree with the chief complaint, past medical history, past surgical history, allergies, medications, social and family history as documented unless otherwise noted below.    For Physician Assistant/ Nurse Practitioner cases/documentation I have personally evaluated this patient and have completed at least one if not all key elements of the E/M (history, physical exam, and MDM). Additional findings are as noted.    Preliminary note started at 2:42 PM EDT    Primary Care Physician:  Belle Renteria, DO    Screenings:  [unfilled]    CHIEF COMPLAINT       Chief Complaint   Patient presents with    Shoulder Pain     right       RECENT VITALS:   /69   Pulse 86   Temp 98.1 °F (36.7 °C)   Resp 19   Ht 1.727 m (5' 8\")   Wt 80.1 kg (176 lb 9.4 oz)   SpO2 92%   BMI 26.85 kg/m²     LABS:  Labs Reviewed   CBC WITH AUTO DIFFERENTIAL - Abnormal; Notable for the following components:       Result Value    WBC 12.4 (*)     RBC 3.64 (*)     Hemoglobin 10.7 (*)     Hematocrit 33.8 (*)     Neutrophils % 74 (*)     Lymphocytes % 13 (*)     Immature Granulocytes % 2 (*)     Neutrophils Absolute 9.39 (*)     All other components within normal limits   COMPREHENSIVE METABOLIC PANEL   TROPONIN   TROPONIN   URINALYSIS   LIPASE       Radiology  CT HEAD WO CONTRAST    (Results Pending)   CT CHEST PULMONARY EMBOLISM W CONTRAST    (Results Pending)   XR SHOULDER RIGHT (MIN 2 VIEWS)    
     [facility] ED  Emergency Department  Emergency Medicine Resident Turn-Over     Note Started: 5:06 PM EDT    Care of Greg Flores was assumed from Dr. Farmer and is being seen for Shoulder Pain (right)  .  The patient's initial evaluation and plan have been discussed with the prior provider who initially evaluated the patient.     EMERGENCY DEPARTMENT COURSE / MEDICAL DECISION MAKING:       MEDICATIONS GIVEN:  Orders Placed This Encounter   Medications    acetaminophen (TYLENOL) tablet 1,000 mg    cyclobenzaprine (FLEXERIL) tablet 10 mg    morphine injection 2 mg    aluminum & magnesium hydroxide-simethicone (MAALOX) 200-200-20 MG/5ML suspension 30 mL    morphine injection 4 mg    iopamidol (ISOVUE-370) 76 % injection 75 mL    colchicine (COLCRYS) 0.6 MG tablet     Sig: Take 1 tablet by mouth daily     Dispense:  30 tablet     Refill:  3    cyclobenzaprine (FLEXERIL) 5 MG tablet     Sig: Take 1 tablet by mouth 2 times daily as needed for Muscle spasms     Dispense:  10 tablet     Refill:  0    predniSONE (DELTASONE) 20 MG tablet     Sig: Take 1 tablet by mouth daily for 5 days     Dispense:  5 tablet     Refill:  0       LABS / RADIOLOGY:     Labs Reviewed   CBC WITH AUTO DIFFERENTIAL - Abnormal; Notable for the following components:       Result Value    WBC 12.4 (*)     RBC 3.64 (*)     Hemoglobin 10.7 (*)     Hematocrit 33.8 (*)     Neutrophils % 74 (*)     Lymphocytes % 13 (*)     Immature Granulocytes % 2 (*)     Neutrophils Absolute 9.39 (*)     All other components within normal limits   TROPONIN - Abnormal; Notable for the following components:    Troponin, High Sensitivity 29 (*)     All other components within normal limits   URINALYSIS - Abnormal; Notable for the following components:    Glucose, Ur 1+ (*)     Protein, UA 1+ (*)     All other components within normal limits   COMPREHENSIVE METABOLIC PANEL - Abnormal; Notable for the following components:    Sodium 134 (*)     
Negative TTP of bilateral calfs to squeeze.  5/5 strength in bilateral upper extremities.  Decreased ROM of right shoulder with abduction limited to 30 degrees.  Palpable radial DP pulses.  5/5 strength of bilateral lower extremities.     Concern for: ACS/STEMI/NSTEMI, pulmonary embolism, DVT, intracranial bleed, UTI, arrhythmia, shoulder fracture/dislocation, pacemaker malfunction   [AS]   1324 Plan/Impression: Given syncopal episode with fall and facial trauma, will obtain CT head to rule intracranial bleed.  Will obtain CT PE given history of long distance travel with right calf swelling.  Obtain x-ray of right shoulder rule any fracture or dislocation.  Will obtain a cardiopulmonary workup.    Pending on workup results, anticipate admission at minimum to observation for cardiology evaluation given baseline elevated heart score.  Will discuss with patient and daughter about what they would prefer [AS]   1423 WBC(!): 12.4  CBC shows borderline elevated white blood cell count with a downtrend from 3 weeks ago.  Hemoglobin within patient's baseline range.  No indication of acute blood loss.  No indication of vitamin deficient based on MCV. [AS]   1432 Medtronic: no arrhythmias since 28th of March. Chronic RV threshold. Pacemaker functioning optimally and as programmed.  [AS]   1547 R Shoulder IMPRESSION:  No acute fracture or dislocation. Acromioclavicular joint osteoarthritis.   [AS]   1548 L shoulder FINDINGS:  There is osteoarthritis of the left acromioclavicular joint, with joint space narrowing and some osteophytes.  There is no evidence of any fracture or dislocation.   [AS]   1609 Bacteria, UA: None  No UTI [AS]   1631 Lipase: 14  No indication of pancreatitis. [AS]   1631 Troponin, High Sensitivity(!): 29  First Trope within patient's baseline range.  Will trend with second.  If uptrending will obtain repeat EKG [AS]   1632 Sodium(!): 134  CMP shows borderline low sodium.  No other acute electrolyte

## 2024-06-26 LAB
EKG ATRIAL RATE: 86 BPM
EKG P AXIS: 64 DEGREES
EKG P-R INTERVAL: 216 MS
EKG Q-T INTERVAL: 394 MS
EKG QRS DURATION: 166 MS
EKG QTC CALCULATION (BAZETT): 471 MS
EKG R AXIS: -60 DEGREES
EKG T AXIS: 15 DEGREES
EKG VENTRICULAR RATE: 86 BPM

## 2024-07-01 ENCOUNTER — OFFICE VISIT (OUTPATIENT)
Dept: FAMILY MEDICINE CLINIC | Age: 84
End: 2024-07-01
Payer: MEDICARE

## 2024-07-01 VITALS
SYSTOLIC BLOOD PRESSURE: 140 MMHG | OXYGEN SATURATION: 95 % | WEIGHT: 176 LBS | BODY MASS INDEX: 26.07 KG/M2 | DIASTOLIC BLOOD PRESSURE: 76 MMHG | HEART RATE: 88 BPM | HEIGHT: 69 IN

## 2024-07-01 DIAGNOSIS — E11.65 TYPE 2 DIABETES MELLITUS WITH HYPERGLYCEMIA, WITHOUT LONG-TERM CURRENT USE OF INSULIN (HCC): ICD-10-CM

## 2024-07-01 DIAGNOSIS — N18.30 CRF (CHRONIC RENAL FAILURE), STAGE 3 (MODERATE) (HCC): ICD-10-CM

## 2024-07-01 DIAGNOSIS — M15.9 PRIMARY OSTEOARTHRITIS INVOLVING MULTIPLE JOINTS: Primary | ICD-10-CM

## 2024-07-01 DIAGNOSIS — I20.89 OTHER FORMS OF ANGINA PECTORIS (HCC): ICD-10-CM

## 2024-07-01 DIAGNOSIS — D64.9 ANEMIA, UNSPECIFIED TYPE: ICD-10-CM

## 2024-07-01 PROCEDURE — 3044F HG A1C LEVEL LT 7.0%: CPT | Performed by: FAMILY MEDICINE

## 2024-07-01 PROCEDURE — 3077F SYST BP >= 140 MM HG: CPT | Performed by: FAMILY MEDICINE

## 2024-07-01 PROCEDURE — 3078F DIAST BP <80 MM HG: CPT | Performed by: FAMILY MEDICINE

## 2024-07-01 PROCEDURE — 99214 OFFICE O/P EST MOD 30 MIN: CPT | Performed by: FAMILY MEDICINE

## 2024-07-01 PROCEDURE — G8417 CALC BMI ABV UP PARAM F/U: HCPCS | Performed by: FAMILY MEDICINE

## 2024-07-01 PROCEDURE — G8427 DOCREV CUR MEDS BY ELIG CLIN: HCPCS | Performed by: FAMILY MEDICINE

## 2024-07-01 PROCEDURE — 1036F TOBACCO NON-USER: CPT | Performed by: FAMILY MEDICINE

## 2024-07-01 PROCEDURE — 1123F ACP DISCUSS/DSCN MKR DOCD: CPT | Performed by: FAMILY MEDICINE

## 2024-07-01 SDOH — ECONOMIC STABILITY: FOOD INSECURITY: WITHIN THE PAST 12 MONTHS, THE FOOD YOU BOUGHT JUST DIDN'T LAST AND YOU DIDN'T HAVE MONEY TO GET MORE.: NEVER TRUE

## 2024-07-01 SDOH — ECONOMIC STABILITY: INCOME INSECURITY: HOW HARD IS IT FOR YOU TO PAY FOR THE VERY BASICS LIKE FOOD, HOUSING, MEDICAL CARE, AND HEATING?: NOT HARD AT ALL

## 2024-07-01 SDOH — ECONOMIC STABILITY: FOOD INSECURITY: WITHIN THE PAST 12 MONTHS, YOU WORRIED THAT YOUR FOOD WOULD RUN OUT BEFORE YOU GOT MONEY TO BUY MORE.: NEVER TRUE

## 2024-07-01 NOTE — PROGRESS NOTES
[facility]  [facility] MEDICINE  [address]  [address]  [facility] [address]  Dept: [phone]      Greg Flores is a 84 y.o. male who presents today for follow up on his  medical conditions as noted below.      Chief Complaint   Patient presents with    Joint Pain     ER follow up 6/25       Patient Active Problem List:     Lymphoma in remission ([facility])     Hypertension     Anxiety     Type 2 diabetes mellitus with hyperglycemia, without long-term current use of insulin ([facility])     Gout     CAD (coronary artery disease)     GERD (gastroesophageal reflux disease)     Hoarseness of voice     Aphthous stomatitis     Acute pharyngitis     Hyperlipidemia, mixed     Contusion of second toe, left     Urinary frequency     HTN (hypertension)     Hyperlipemia, mixed     Headache     Headache     Stable angina ([facility])     CKD (chronic kidney disease)     Urinary retention     Syncope     Chest pain     Hypotension     Nonspecific ST-T wave electrocardiographic changes     Heart attack ([facility])     CAD, multiple vessel     Multiple vessel coronary artery disease     Arteriosclerosis of arterial coronary artery bypass graft     Sinus pause     MELO (acute kidney injury) ([facility])     Hyponatremia     Bradycardia     Tachy-kelly syndrome ([facility])     Generalized weakness     S/P CABG (coronary artery bypass graft)     S/P placement of cardiac pacemaker     Localized osteoarthritis of left knee     Past Medical History:   Diagnosis Date    Anxiety     with the chemotherapy     CAD (coronary artery disease)     Dr Mccauley, has three stents.    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

## 2024-07-11 NOTE — TELEPHONE ENCOUNTER
Greg Flores is calling to request a refill on the following medication(s):    Last Visit Date (If Applicable):  7/1/2024    Next Visit Date:    9/4/2024    Medication Request:  Requested Prescriptions     Pending Prescriptions Disp Refills    metFORMIN (GLUCOPHAGE) 1000 MG tablet [Pharmacy Med Name: METFORMIN HCL 1,000 MG TABLET] 180 tablet 3     Sig: TAKE 1 TABLET BY MOUTH TWICE A DAY WITH MEALS

## 2024-07-22 RX ORDER — TAMSULOSIN HYDROCHLORIDE 0.4 MG/1
0.4 CAPSULE ORAL DAILY
Qty: 30 CAPSULE | Refills: 3 | OUTPATIENT
Start: 2024-07-22

## 2024-07-22 RX ORDER — SEMAGLUTIDE 0.68 MG/ML
INJECTION, SOLUTION SUBCUTANEOUS
Qty: 3 ML | Refills: 0 | Status: SHIPPED | OUTPATIENT
Start: 2024-07-22

## 2024-07-22 NOTE — TELEPHONE ENCOUNTER
Greg Flores is calling to request a refill on the following medication(s):    Last Visit Date (If Applicable):  7/1/2024    Next Visit Date:    9/4/2024    Medication Request:  Requested Prescriptions     Pending Prescriptions Disp Refills    OZEMPIC, 0.25 OR 0.5 MG/DOSE, 2 MG/3ML SOPN [Pharmacy Med Name: OZEMPIC 0.25-0.5 MG/DOSE(2 MG/3 ML)] 3 mL 0     Sig: DIAL AND INJECT UNDER THE SKIN 0.25 MG WEEKLY FOR 4 WEEKS THEN DIAL AND INJECT 0.5 MG WEEKLY THEREAFTER

## 2024-07-24 RX ORDER — TAMSULOSIN HYDROCHLORIDE 0.4 MG/1
0.4 CAPSULE ORAL DAILY
Qty: 90 CAPSULE | OUTPATIENT
Start: 2024-07-24

## 2024-09-01 ENCOUNTER — TELEPHONE (OUTPATIENT)
Dept: FAMILY MEDICINE CLINIC | Age: 84
End: 2024-09-01

## 2024-09-01 RX ORDER — SEMAGLUTIDE 0.68 MG/ML
0.25 INJECTION, SOLUTION SUBCUTANEOUS WEEKLY
Qty: 9 ML | Refills: 3 | Status: SHIPPED | OUTPATIENT
Start: 2024-09-01 | End: 2024-10-01

## 2024-09-19 ENCOUNTER — OFFICE VISIT (OUTPATIENT)
Age: 84
End: 2024-09-19
Payer: MEDICARE

## 2024-09-19 VITALS
WEIGHT: 176 LBS | DIASTOLIC BLOOD PRESSURE: 69 MMHG | RESPIRATION RATE: 15 BRPM | SYSTOLIC BLOOD PRESSURE: 118 MMHG | BODY MASS INDEX: 26.07 KG/M2 | HEART RATE: 75 BPM | HEIGHT: 69 IN

## 2024-09-19 DIAGNOSIS — M47.816 LUMBAR SPONDYLOSIS: ICD-10-CM

## 2024-09-19 DIAGNOSIS — M54.50 CHRONIC BILATERAL LOW BACK PAIN WITHOUT SCIATICA: Primary | ICD-10-CM

## 2024-09-19 DIAGNOSIS — G89.29 CHRONIC BILATERAL LOW BACK PAIN WITHOUT SCIATICA: Primary | ICD-10-CM

## 2024-09-19 DIAGNOSIS — Z98.890 HISTORY OF LUMBAR SURGERY: ICD-10-CM

## 2024-09-19 PROCEDURE — 3074F SYST BP LT 130 MM HG: CPT | Performed by: NEUROLOGICAL SURGERY

## 2024-09-19 PROCEDURE — 99214 OFFICE O/P EST MOD 30 MIN: CPT | Performed by: NEUROLOGICAL SURGERY

## 2024-09-19 PROCEDURE — G8427 DOCREV CUR MEDS BY ELIG CLIN: HCPCS | Performed by: NEUROLOGICAL SURGERY

## 2024-09-19 PROCEDURE — 3078F DIAST BP <80 MM HG: CPT | Performed by: NEUROLOGICAL SURGERY

## 2024-09-19 PROCEDURE — 1123F ACP DISCUSS/DSCN MKR DOCD: CPT | Performed by: NEUROLOGICAL SURGERY

## 2024-09-19 PROCEDURE — 1036F TOBACCO NON-USER: CPT | Performed by: NEUROLOGICAL SURGERY

## 2024-09-19 PROCEDURE — G8417 CALC BMI ABV UP PARAM F/U: HCPCS | Performed by: NEUROLOGICAL SURGERY

## 2024-09-19 RX ORDER — IBUPROFEN 800 MG/1
800 TABLET, FILM COATED ORAL 2 TIMES DAILY PRN
Qty: 60 TABLET | Refills: 1 | Status: SHIPPED | OUTPATIENT
Start: 2024-09-19

## 2024-10-03 RX ORDER — TAMSULOSIN HYDROCHLORIDE 0.4 MG/1
0.4 CAPSULE ORAL DAILY
Qty: 30 CAPSULE | Refills: 3 | Status: SHIPPED | OUTPATIENT
Start: 2024-10-03

## 2024-10-03 NOTE — TELEPHONE ENCOUNTER
Greg Flores is calling to request a refill on the following medication(s):    Last Visit Date (If Applicable):  7/1/2024    Next Visit Date:    Visit date not found    Medication Request:  Requested Prescriptions     Pending Prescriptions Disp Refills    tamsulosin (FLOMAX) 0.4 MG capsule [Pharmacy Med Name: TAMSULOSIN HCL 0.4 MG CAPSULE] 30 capsule 3     Sig: TAKE 1 CAPSULE BY MOUTH DAILY

## 2024-10-22 ENCOUNTER — OFFICE VISIT (OUTPATIENT)
Dept: FAMILY MEDICINE CLINIC | Age: 84
End: 2024-10-22
Payer: MEDICARE

## 2024-10-22 VITALS
OXYGEN SATURATION: 95 % | SYSTOLIC BLOOD PRESSURE: 138 MMHG | HEIGHT: 69 IN | HEART RATE: 77 BPM | WEIGHT: 180 LBS | DIASTOLIC BLOOD PRESSURE: 83 MMHG | BODY MASS INDEX: 26.66 KG/M2

## 2024-10-22 DIAGNOSIS — E11.65 TYPE 2 DIABETES MELLITUS WITH HYPERGLYCEMIA, WITHOUT LONG-TERM CURRENT USE OF INSULIN (HCC): ICD-10-CM

## 2024-10-22 DIAGNOSIS — I49.5 TACHY-BRADY SYNDROME (HCC): ICD-10-CM

## 2024-10-22 DIAGNOSIS — N42.9 PROSTATE DISORDER: ICD-10-CM

## 2024-10-22 DIAGNOSIS — N18.30 CRF (CHRONIC RENAL FAILURE), STAGE 3 (MODERATE) (HCC): ICD-10-CM

## 2024-10-22 DIAGNOSIS — Z00.00 MEDICARE ANNUAL WELLNESS VISIT, SUBSEQUENT: Primary | ICD-10-CM

## 2024-10-22 DIAGNOSIS — E78.2 HYPERLIPEMIA, MIXED: ICD-10-CM

## 2024-10-22 DIAGNOSIS — I25.10 CORONARY ARTERY DISEASE INVOLVING NATIVE HEART, UNSPECIFIED VESSEL OR LESION TYPE, UNSPECIFIED WHETHER ANGINA PRESENT: ICD-10-CM

## 2024-10-22 DIAGNOSIS — M15.0 PRIMARY OSTEOARTHRITIS INVOLVING MULTIPLE JOINTS: ICD-10-CM

## 2024-10-22 DIAGNOSIS — K21.9 GASTROESOPHAGEAL REFLUX DISEASE, UNSPECIFIED WHETHER ESOPHAGITIS PRESENT: ICD-10-CM

## 2024-10-22 DIAGNOSIS — I10 PRIMARY HYPERTENSION: ICD-10-CM

## 2024-10-22 DIAGNOSIS — M1A.49X0 OTHER SECONDARY CHRONIC GOUT OF MULTIPLE SITES WITHOUT TOPHUS: ICD-10-CM

## 2024-10-22 DIAGNOSIS — C85.9A LYMPHOMA IN REMISSION: ICD-10-CM

## 2024-10-22 DIAGNOSIS — D64.9 ANEMIA, UNSPECIFIED TYPE: ICD-10-CM

## 2024-10-22 LAB — HBA1C MFR BLD: 9.9 %

## 2024-10-22 PROCEDURE — 3079F DIAST BP 80-89 MM HG: CPT | Performed by: FAMILY MEDICINE

## 2024-10-22 PROCEDURE — G0439 PPPS, SUBSEQ VISIT: HCPCS | Performed by: FAMILY MEDICINE

## 2024-10-22 PROCEDURE — G8484 FLU IMMUNIZE NO ADMIN: HCPCS | Performed by: FAMILY MEDICINE

## 2024-10-22 PROCEDURE — 3075F SYST BP GE 130 - 139MM HG: CPT | Performed by: FAMILY MEDICINE

## 2024-10-22 PROCEDURE — 3046F HEMOGLOBIN A1C LEVEL >9.0%: CPT | Performed by: FAMILY MEDICINE

## 2024-10-22 PROCEDURE — 1123F ACP DISCUSS/DSCN MKR DOCD: CPT | Performed by: FAMILY MEDICINE

## 2024-10-22 PROCEDURE — 83036 HEMOGLOBIN GLYCOSYLATED A1C: CPT | Performed by: FAMILY MEDICINE

## 2024-10-22 RX ORDER — SEMAGLUTIDE 1.34 MG/ML
1 INJECTION, SOLUTION SUBCUTANEOUS WEEKLY
Qty: 12 ML | Refills: 3 | Status: SHIPPED | OUTPATIENT
Start: 2024-10-22

## 2024-10-22 ASSESSMENT — PATIENT HEALTH QUESTIONNAIRE - PHQ9
7. TROUBLE CONCENTRATING ON THINGS, SUCH AS READING THE NEWSPAPER OR WATCHING TELEVISION: NOT AT ALL
1. LITTLE INTEREST OR PLEASURE IN DOING THINGS: NOT AT ALL
3. TROUBLE FALLING OR STAYING ASLEEP: NOT AT ALL
6. FEELING BAD ABOUT YOURSELF - OR THAT YOU ARE A FAILURE OR HAVE LET YOURSELF OR YOUR FAMILY DOWN: NOT AT ALL
8. MOVING OR SPEAKING SO SLOWLY THAT OTHER PEOPLE COULD HAVE NOTICED. OR THE OPPOSITE, BEING SO FIGETY OR RESTLESS THAT YOU HAVE BEEN MOVING AROUND A LOT MORE THAN USUAL: NOT AT ALL
SUM OF ALL RESPONSES TO PHQ QUESTIONS 1-9: 0
SUM OF ALL RESPONSES TO PHQ QUESTIONS 1-9: 0
SUM OF ALL RESPONSES TO PHQ9 QUESTIONS 1 & 2: 0
SUM OF ALL RESPONSES TO PHQ QUESTIONS 1-9: 0
10. IF YOU CHECKED OFF ANY PROBLEMS, HOW DIFFICULT HAVE THESE PROBLEMS MADE IT FOR YOU TO DO YOUR WORK, TAKE CARE OF THINGS AT HOME, OR GET ALONG WITH OTHER PEOPLE: NOT DIFFICULT AT ALL
SUM OF ALL RESPONSES TO PHQ QUESTIONS 1-9: 0
4. FEELING TIRED OR HAVING LITTLE ENERGY: NOT AT ALL
2. FEELING DOWN, DEPRESSED OR HOPELESS: NOT AT ALL
5. POOR APPETITE OR OVEREATING: NOT AT ALL
9. THOUGHTS THAT YOU WOULD BE BETTER OFF DEAD, OR OF HURTING YOURSELF: NOT AT ALL

## 2024-10-22 ASSESSMENT — LIFESTYLE VARIABLES
HOW OFTEN DO YOU HAVE A DRINK CONTAINING ALCOHOL: MONTHLY OR LESS
HOW MANY STANDARD DRINKS CONTAINING ALCOHOL DO YOU HAVE ON A TYPICAL DAY: 1 OR 2

## 2024-10-22 NOTE — PROGRESS NOTES
Medicare Annual Wellness Visit    Greg Flores is here for Medicare AWV    Assessment & Plan   Medicare annual wellness visit, subsequent  Type 2 diabetes mellitus with hyperglycemia, without long-term current use of insulin (HCC)  -     POCT glycosylated hemoglobin (Hb A1C)  -     CBC with Auto Differential; Future  -     Comprehensive Metabolic Panel; Future  -     Lipid Panel; Future  -     T4, Free; Future  -     TSH; Future  -     Thyroid Peroxidase Antibody; Future  -     PSA, Prostatic Specific Antigen; Future  -     Magnesium; Future  -     Iron and TIBC; Future  -     Vitamin B12 & Folate; Future  -     Uric Acid; Future  -     OZEMPIC, 1 MG/DOSE, 4 MG/3ML SOPN sc injection; Inject 1 mg into the skin once a week, Disp-12 mL, R-3, DAWNormal  CRF (chronic renal failure), stage 3 (moderate) (HCC)  -     CBC with Auto Differential; Future  -     Comprehensive Metabolic Panel; Future  -     Lipid Panel; Future  -     T4, Free; Future  -     TSH; Future  -     Thyroid Peroxidase Antibody; Future  -     PSA, Prostatic Specific Antigen; Future  -     Magnesium; Future  -     Iron and TIBC; Future  -     Vitamin B12 & Folate; Future  -     Uric Acid; Future  Primary osteoarthritis involving multiple joints  -     CBC with Auto Differential; Future  -     Comprehensive Metabolic Panel; Future  -     Lipid Panel; Future  -     T4, Free; Future  -     TSH; Future  -     Thyroid Peroxidase Antibody; Future  -     PSA, Prostatic Specific Antigen; Future  -     Magnesium; Future  -     Iron and TIBC; Future  -     Vitamin B12 & Folate; Future  -     Uric Acid; Future  Lymphoma in remission  -     CBC with Auto Differential; Future  -     Comprehensive Metabolic Panel; Future  -     Lipid Panel; Future  -     T4, Free; Future  -     TSH; Future  -     Thyroid Peroxidase Antibody; Future  -     PSA, Prostatic Specific Antigen; Future  -     Magnesium; Future  -     Iron and TIBC; Future  -     Vitamin B12 & Folate;

## 2024-10-22 NOTE — PATIENT INSTRUCTIONS

## 2024-10-30 ENCOUNTER — HOSPITAL ENCOUNTER (OUTPATIENT)
Age: 84
Discharge: HOME | End: 2024-10-30
Payer: MEDICARE

## 2024-10-30 DIAGNOSIS — E11.65 TYPE 2 DIABETES MELLITUS WITH HYPERGLYCEMIA, WITHOUT LONG-TERM CURRENT USE OF INSULIN (HCC): ICD-10-CM

## 2024-10-30 DIAGNOSIS — N42.9 PROSTATE DISORDER: ICD-10-CM

## 2024-10-30 DIAGNOSIS — N18.30 CRF (CHRONIC RENAL FAILURE), STAGE 3 (MODERATE) (HCC): ICD-10-CM

## 2024-10-30 DIAGNOSIS — I10 PRIMARY HYPERTENSION: ICD-10-CM

## 2024-10-30 DIAGNOSIS — K21.9 GASTROESOPHAGEAL REFLUX DISEASE, UNSPECIFIED WHETHER ESOPHAGITIS PRESENT: ICD-10-CM

## 2024-10-30 DIAGNOSIS — I49.5 TACHY-BRADY SYNDROME (HCC): ICD-10-CM

## 2024-10-30 DIAGNOSIS — D64.9 ANEMIA, UNSPECIFIED TYPE: ICD-10-CM

## 2024-10-30 DIAGNOSIS — I25.10 CORONARY ARTERY DISEASE INVOLVING NATIVE HEART, UNSPECIFIED VESSEL OR LESION TYPE, UNSPECIFIED WHETHER ANGINA PRESENT: ICD-10-CM

## 2024-10-30 DIAGNOSIS — M15.0 PRIMARY OSTEOARTHRITIS INVOLVING MULTIPLE JOINTS: ICD-10-CM

## 2024-10-30 DIAGNOSIS — M1A.49X0 OTHER SECONDARY CHRONIC GOUT OF MULTIPLE SITES WITHOUT TOPHUS: ICD-10-CM

## 2024-10-30 DIAGNOSIS — C85.9A LYMPHOMA IN REMISSION: ICD-10-CM

## 2024-10-30 DIAGNOSIS — E78.2 HYPERLIPEMIA, MIXED: ICD-10-CM

## 2024-10-30 LAB
ALBUMIN SERPL-MCNC: 4.5 G/DL (ref 3.5–5.2)
ALBUMIN/GLOB SERPL: 1.6 {RATIO} (ref 1–2.5)
ALP SERPL-CCNC: 103 U/L (ref 40–129)
ALT SERPL-CCNC: 6 U/L (ref 10–50)
ANION GAP SERPL CALCULATED.3IONS-SCNC: 13 MMOL/L (ref 9–16)
AST SERPL-CCNC: 8 U/L (ref 10–50)
BASOPHILS # BLD: 0.05 K/UL (ref 0–0.2)
BASOPHILS NFR BLD: 1 % (ref 0–2)
BILIRUB SERPL-MCNC: 0.3 MG/DL (ref 0–1.2)
BUN SERPL-MCNC: 39 MG/DL (ref 8–23)
CALCIUM SERPL-MCNC: 9.5 MG/DL (ref 8.6–10.4)
CHLORIDE SERPL-SCNC: 104 MMOL/L (ref 98–107)
CHOLEST SERPL-MCNC: 158 MG/DL (ref 0–199)
CHOLESTEROL/HDL RATIO: 3.6
CO2 SERPL-SCNC: 23 MMOL/L (ref 20–31)
CREAT SERPL-MCNC: 1.4 MG/DL (ref 0.7–1.2)
EOSINOPHIL # BLD: 0.3 K/UL (ref 0–0.44)
EOSINOPHILS RELATIVE PERCENT: 5 % (ref 1–4)
ERYTHROCYTE [DISTWIDTH] IN BLOOD BY AUTOMATED COUNT: 12.6 % (ref 11.8–14.4)
FOLATE SERPL-MCNC: 11 NG/ML (ref 4.8–24.2)
GFR, ESTIMATED: 50 ML/MIN/1.73M2
GLUCOSE SERPL-MCNC: 190 MG/DL (ref 74–99)
HCT VFR BLD AUTO: 36.8 % (ref 40.7–50.3)
HDLC SERPL-MCNC: 44 MG/DL
HGB BLD-MCNC: 12.2 G/DL (ref 13–17)
IMM GRANULOCYTES # BLD AUTO: 0.04 K/UL (ref 0–0.3)
IMM GRANULOCYTES NFR BLD: 1 %
IRON SATN MFR SERPL: 23 % (ref 20–55)
IRON SERPL-MCNC: 83 UG/DL (ref 61–157)
LDLC SERPL CALC-MCNC: 86 MG/DL (ref 0–100)
LYMPHOCYTES NFR BLD: 1.38 K/UL (ref 1.1–3.7)
LYMPHOCYTES RELATIVE PERCENT: 23 % (ref 24–43)
MAGNESIUM SERPL-MCNC: 1.7 MG/DL (ref 1.6–2.4)
MCH RBC QN AUTO: 30.8 PG (ref 25.2–33.5)
MCHC RBC AUTO-ENTMCNC: 33.2 G/DL (ref 28.4–34.8)
MCV RBC AUTO: 92.9 FL (ref 82.6–102.9)
MONOCYTES NFR BLD: 0.57 K/UL (ref 0.1–1.2)
MONOCYTES NFR BLD: 10 % (ref 3–12)
NEUTROPHILS NFR BLD: 60 % (ref 36–65)
NEUTS SEG NFR BLD: 3.62 K/UL (ref 1.5–8.1)
NRBC BLD-RTO: 0 PER 100 WBC
PLATELET # BLD AUTO: 308 K/UL (ref 138–453)
PMV BLD AUTO: 10.1 FL (ref 8.1–13.5)
POTASSIUM SERPL-SCNC: 4.7 MMOL/L (ref 3.7–5.3)
PROT SERPL-MCNC: 7.3 G/DL (ref 6.6–8.7)
PSA SERPL-MCNC: 4.4 NG/ML (ref 0–4)
RBC # BLD AUTO: 3.96 M/UL (ref 4.21–5.77)
SODIUM SERPL-SCNC: 140 MMOL/L (ref 136–145)
T4 FREE SERPL-MCNC: 1.3 NG/DL (ref 0.92–1.68)
TIBC SERPL-MCNC: 358 UG/DL (ref 250–450)
TRIGL SERPL-MCNC: 138 MG/DL
TSH SERPL DL<=0.05 MIU/L-ACNC: 1.76 UIU/ML (ref 0.27–4.2)
UNSATURATED IRON BINDING CAPACITY: 275 UG/DL (ref 112–347)
URATE SERPL-MCNC: 7.6 MG/DL (ref 3.4–7)
VIT B12 SERPL-MCNC: 304 PG/ML (ref 232–1245)
VLDLC SERPL CALC-MCNC: 28 MG/DL (ref 1–30)
WBC OTHER # BLD: 6 K/UL (ref 3.5–11.3)

## 2024-10-30 PROCEDURE — 80061 LIPID PANEL: CPT

## 2024-10-30 PROCEDURE — 36415 COLL VENOUS BLD VENIPUNCTURE: CPT

## 2024-10-30 PROCEDURE — 82607 VITAMIN B-12: CPT

## 2024-10-30 PROCEDURE — 84443 ASSAY THYROID STIM HORMONE: CPT

## 2024-10-30 PROCEDURE — 83550 IRON BINDING TEST: CPT

## 2024-10-30 PROCEDURE — 84153 ASSAY OF PSA TOTAL: CPT

## 2024-10-30 PROCEDURE — 85025 COMPLETE CBC W/AUTO DIFF WBC: CPT

## 2024-10-30 PROCEDURE — 80053 COMPREHEN METABOLIC PANEL: CPT

## 2024-10-30 PROCEDURE — 83540 ASSAY OF IRON: CPT

## 2024-10-30 PROCEDURE — 83735 ASSAY OF MAGNESIUM: CPT

## 2024-10-30 PROCEDURE — 82746 ASSAY OF FOLIC ACID SERUM: CPT

## 2024-10-30 PROCEDURE — 84550 ASSAY OF BLOOD/URIC ACID: CPT

## 2024-10-30 PROCEDURE — 86376 MICROSOMAL ANTIBODY EACH: CPT

## 2024-10-30 PROCEDURE — 84439 ASSAY OF FREE THYROXINE: CPT

## 2024-11-02 LAB — THYROPEROXIDASE AB SERPL IA-ACNC: 249 IU/ML (ref 0–25)

## 2024-11-13 RX ORDER — ALLOPURINOL 100 MG/1
100 TABLET ORAL DAILY
Qty: 90 TABLET | Refills: 1 | Status: SHIPPED | OUTPATIENT
Start: 2024-11-13

## 2024-11-14 DIAGNOSIS — N42.9 PROSTATE DISORDER: Primary | ICD-10-CM

## 2024-11-27 RX ORDER — PRAVASTATIN SODIUM 80 MG/1
80 TABLET ORAL DAILY
Qty: 90 TABLET | Refills: 3 | Status: SHIPPED | OUTPATIENT
Start: 2024-11-27

## 2024-11-27 NOTE — TELEPHONE ENCOUNTER
Greg Flores is calling to request a refill on the following medication(s):    Last Visit Date (If Applicable):  10/22/2024    Next Visit Date:    Visit date not found    Medication Request:  Requested Prescriptions     Pending Prescriptions Disp Refills    pravastatin (PRAVACHOL) 80 MG tablet [Pharmacy Med Name: PRAVASTATIN SODIUM 80 MG TAB] 90 tablet 3     Sig: TAKE 1 TABLET BY MOUTH DAILY

## 2025-01-03 ENCOUNTER — TELEPHONE (OUTPATIENT)
Dept: FAMILY MEDICINE CLINIC | Age: 85
End: 2025-01-03

## 2025-01-03 NOTE — TELEPHONE ENCOUNTER
Patient has tested covid positive today, has symptoms of dry cough, headache, slight chest congestion , dizzy x 2 days. Patient would like to if paxlovid can be sent to pharmacy

## 2025-01-04 NOTE — TELEPHONE ENCOUNTER
Paxlovid called in. Needs to make sure to watch for any side effect as the medication could be interfere with Eliquis.

## 2025-01-23 ENCOUNTER — TELEPHONE (OUTPATIENT)
Dept: FAMILY MEDICINE CLINIC | Age: 85
End: 2025-01-23

## 2025-01-23 NOTE — TELEPHONE ENCOUNTER
Kai from [facility] is helping the patient to get medicaid she is stating that patient is on medication for depression and the depression diagnose is not on his problem list, therefore patient would not qualify for medicaid. Kai would like to know if depression could be added and the updated list be fax to her @[phone].  Please advise

## 2025-01-24 PROBLEM — F32.A DEPRESSION: Status: ACTIVE | Noted: 2025-01-24

## 2025-01-24 NOTE — TELEPHONE ENCOUNTER
Kai calling back to inform she never received this, I updated the problem list and this was faxed to [phone]

## 2025-02-03 RX ORDER — TAMSULOSIN HYDROCHLORIDE 0.4 MG/1
0.4 CAPSULE ORAL DAILY
Qty: 90 CAPSULE | Refills: 3 | Status: SHIPPED | OUTPATIENT
Start: 2025-02-03

## 2025-02-03 NOTE — TELEPHONE ENCOUNTER
Greg Flores is calling to request a refill on the following medication(s):    Medication Request:  Requested Prescriptions     Pending Prescriptions Disp Refills    tamsulosin (FLOMAX) 0.4 MG capsule [Pharmacy Med Name: TAMSULOSIN HCL 0.4 MG CAPSULE] 30 capsule 3     Sig: TAKE 1 CAPSULE BY MOUTH DAILY       Last Visit Date (If Applicable):  10/22/2024    Next Visit Date:    Visit date not found

## 2025-02-10 ENCOUNTER — TELEPHONE (OUTPATIENT)
Dept: FAMILY MEDICINE CLINIC | Age: 85
End: 2025-02-10

## 2025-02-10 NOTE — TELEPHONE ENCOUNTER
I would have no idea what test order for complaint like that it is absolutely too nonspecific maybe it is congestion try taking some Mucinex he is recovering from COVID

## 2025-02-10 NOTE — TELEPHONE ENCOUNTER
Patient wife is calling stating that patient that for the last month that patient has been having heaviness in his head for a month. Wife would like test to see what is going on.  Please advise

## 2025-02-22 ENCOUNTER — TELEPHONE (OUTPATIENT)
Dept: FAMILY MEDICINE CLINIC | Age: 85
End: 2025-02-22

## 2025-02-22 RX ORDER — COLCHICINE 0.6 MG/1
0.6 TABLET ORAL DAILY
Qty: 30 TABLET | Refills: 3 | Status: SHIPPED | OUTPATIENT
Start: 2025-02-22

## 2025-02-22 RX ORDER — PREDNISONE 20 MG/1
TABLET ORAL
Qty: 12 TABLET | Refills: 0 | Status: SHIPPED | OUTPATIENT
Start: 2025-02-22

## 2025-03-03 ENCOUNTER — TELEPHONE (OUTPATIENT)
Dept: FAMILY MEDICINE CLINIC | Age: 85
End: 2025-03-03

## 2025-03-03 RX ORDER — ALFUZOSIN HYDROCHLORIDE 10 MG/1
10 TABLET, EXTENDED RELEASE ORAL DAILY
Qty: 30 TABLET | Refills: 3 | Status: SHIPPED | OUTPATIENT
Start: 2025-03-03

## 2025-03-03 NOTE — TELEPHONE ENCOUNTER
Patients spouse called patient is requesting to be switched from tamsulosin(flomax) to alfuzosin. Please advise.       [facility] [id number] - [place], [place] - [address] - P [phone] - F [phone]  [address] [place] [address] [place] [address]  Phone: [phone]  Fax: [phone]

## 2025-03-16 DIAGNOSIS — D64.9 ANEMIA, UNSPECIFIED TYPE: ICD-10-CM

## 2025-03-16 DIAGNOSIS — N18.30 CRF (CHRONIC RENAL FAILURE), STAGE 3 (MODERATE) (HCC): ICD-10-CM

## 2025-03-16 DIAGNOSIS — E11.65 TYPE 2 DIABETES MELLITUS WITH HYPERGLYCEMIA, WITHOUT LONG-TERM CURRENT USE OF INSULIN (HCC): ICD-10-CM

## 2025-03-17 NOTE — TELEPHONE ENCOUNTER
Greg Flores is calling to request a refill on the following medication(s):    Last Visit Date (If Applicable):  10/22/2024    Next Visit Date:    Visit date not found    Medication Request:  Requested Prescriptions     Pending Prescriptions Disp Refills    PARoxetine (PAXIL) 20 MG tablet [Pharmacy Med Name: PAROXETINE HCL 20 MG TABLET] 90 tablet 11     Sig: TAKE 1 TABLET BY MOUTH DAILY

## 2025-03-18 RX ORDER — PAROXETINE 20 MG/1
20 TABLET, FILM COATED ORAL DAILY
Qty: 90 TABLET | Refills: 11 | Status: SHIPPED | OUTPATIENT
Start: 2025-03-18

## 2025-03-20 ENCOUNTER — TELEPHONE (OUTPATIENT)
Dept: FAMILY MEDICINE CLINIC | Age: 85
End: 2025-03-20

## 2025-03-20 DIAGNOSIS — E11.65 TYPE 2 DIABETES MELLITUS WITH HYPERGLYCEMIA, WITHOUT LONG-TERM CURRENT USE OF INSULIN (HCC): Primary | ICD-10-CM

## 2025-03-21 RX ORDER — AVOBENZONE, HOMOSALATE, OCTISALATE, OCTOCRYLENE 30; 40; 45; 26 MG/ML; MG/ML; MG/ML; MG/ML
1 CREAM TOPICAL 2 TIMES DAILY
Qty: 100 EACH | Refills: 5 | Status: SHIPPED | OUTPATIENT
Start: 2025-03-21

## 2025-03-21 RX ORDER — GLUCOSAMINE HCL/CHONDROITIN SU 500-400 MG
CAPSULE ORAL
Qty: 100 STRIP | Refills: 0 | Status: SHIPPED | OUTPATIENT
Start: 2025-03-21

## 2025-03-22 DIAGNOSIS — I10 PRIMARY HYPERTENSION: ICD-10-CM

## 2025-03-24 RX ORDER — AMLODIPINE BESYLATE 5 MG/1
5 TABLET ORAL DAILY
Qty: 90 TABLET | Refills: 3 | Status: SHIPPED | OUTPATIENT
Start: 2025-03-24

## 2025-03-24 NOTE — TELEPHONE ENCOUNTER
Greg Flores is calling to request a refill on the following medication(s):    Last Visit Date (If Applicable):  10/22/2024    Next Visit Date:    Visit date not found    Medication Request:  Requested Prescriptions     Pending Prescriptions Disp Refills    amLODIPine (NORVASC) 5 MG tablet [Pharmacy Med Name: amLODIPine BESYLATE 5 MG TAB] 90 tablet 3     Sig: TAKE 1 TABLET BY MOUTH DAILY

## 2025-04-18 NOTE — PLAN OF CARE
Problem: Discharge Planning  Goal: Discharge to home or other facility with appropriate resources  Outcome: Progressing  Flowsheets  Taken 7/25/2023 0237 by Arlin Smith RN  Discharge to home or other facility with appropriate resources:   Identify barriers to discharge with patient and caregiver   Arrange for needed discharge resources and transportation as appropriate  Taken 7/24/2023 1938 by Brina Mike RN  Discharge to home or other facility with appropriate resources:   Identify barriers to discharge with patient and caregiver   Identify discharge learning needs (meds, wound care, etc)   Refer to discharge planning if patient needs post-hospital services based on physician order or complex needs related to functional status, cognitive ability or social support system   Arrange for interpreters to assist at discharge as needed  Taken 7/24/2023 1934 by Brina Mike RN  Discharge to home or other facility with appropriate resources:   Identify barriers to discharge with patient and caregiver   Arrange for needed discharge resources and transportation as appropriate   Identify discharge learning needs (meds, wound care, etc)   Refer to discharge planning if patient needs post-hospital services based on physician order or complex needs related to functional status, cognitive ability or social support system     Problem: Pain  Goal: Verbalizes/displays adequate comfort level or baseline comfort level  Outcome: Progressing  Flowsheets (Taken 7/24/2023 2033)  Verbalizes/displays adequate comfort level or baseline comfort level:   Encourage patient to monitor pain and request assistance   Assess pain using appropriate pain scale   Administer analgesics based on type and severity of pain and evaluate response   Implement non-pharmacological measures as appropriate and evaluate response   Notify Licensed Independent Practitioner if interventions unsuccessful or patient reports new pain     Problem: Chronic Conditions and Co-morbidities  Goal: Patient's chronic conditions and co-morbidity symptoms are monitored and maintained or improved  Outcome: Progressing  Flowsheets  Taken 7/25/2023 0237 by Jose Swartz - Patient's Chronic Conditions and Co-Morbidity Symptoms are Monitored and Maintained or Improved:   Monitor and assess patient's chronic conditions and comorbid symptoms for stability, deterioration, or improvement   Collaborate with multidisciplinary team to address chronic and comorbid conditions and prevent exacerbation or deterioration   Update acute care plan with appropriate goals if chronic or comorbid symptoms are exacerbated and prevent overall improvement and discharge  Taken 7/24/2023 1934 by Jose Buchanan - Patient's Chronic Conditions and Co-Morbidity Symptoms are Monitored and Maintained or Improved:   Monitor and assess patient's chronic conditions and comorbid symptoms for stability, deterioration, or improvement   Collaborate with multidisciplinary team to address chronic and comorbid conditions and prevent exacerbation or deterioration   Update acute care plan with appropriate goals if chronic or comorbid symptoms are exacerbated and prevent overall improvement and discharge     Problem: Safety - Adult  Goal: Free from fall injury  Outcome: Progressing negative normal/soft/nontender/nondistended/normal active bowel sounds

## 2025-04-23 ENCOUNTER — TELEPHONE (OUTPATIENT)
Dept: FAMILY MEDICINE CLINIC | Age: 85
End: 2025-04-23

## 2025-04-23 RX ORDER — BENZONATATE 200 MG/1
200 CAPSULE ORAL 3 TIMES DAILY PRN
Qty: 30 CAPSULE | Refills: 0 | Status: SHIPPED | OUTPATIENT
Start: 2025-04-23 | End: 2025-05-03

## 2025-04-23 RX ORDER — DOXYCYCLINE HYCLATE 100 MG
100 TABLET ORAL 2 TIMES DAILY
Qty: 20 TABLET | Refills: 0 | Status: SHIPPED | OUTPATIENT
Start: 2025-04-23 | End: 2025-05-03

## 2025-04-23 NOTE — TELEPHONE ENCOUNTER
Patient has on set of symptoms of cough and lots of phlegm is yellow and onset Friday. He has tried dayquil and nyquil with no relief.Have not covid tested and does not have one. [facility].

## 2025-05-14 ENCOUNTER — RESULTS FOLLOW-UP (OUTPATIENT)
Dept: FAMILY MEDICINE CLINIC | Age: 85
End: 2025-05-14

## 2025-05-14 ENCOUNTER — OFFICE VISIT (OUTPATIENT)
Dept: FAMILY MEDICINE CLINIC | Age: 85
End: 2025-05-14
Payer: MEDICARE

## 2025-05-14 VITALS
WEIGHT: 180 LBS | HEIGHT: 69 IN | HEART RATE: 78 BPM | BODY MASS INDEX: 26.66 KG/M2 | SYSTOLIC BLOOD PRESSURE: 126 MMHG | OXYGEN SATURATION: 96 % | DIASTOLIC BLOOD PRESSURE: 78 MMHG

## 2025-05-14 DIAGNOSIS — M54.16 LUMBAR RADICULOPATHY: Primary | ICD-10-CM

## 2025-05-14 DIAGNOSIS — C85.90 NON-HODGKIN'S LYMPHOMA, UNSPECIFIED BODY REGION, UNSPECIFIED NON-HODGKIN LYMPHOMA TYPE (HCC): ICD-10-CM

## 2025-05-14 DIAGNOSIS — M25.561 CHRONIC PAIN OF RIGHT KNEE: ICD-10-CM

## 2025-05-14 DIAGNOSIS — R93.7 ABNORMAL X-RAY OF LUMBAR SPINE: ICD-10-CM

## 2025-05-14 DIAGNOSIS — G89.29 CHRONIC PAIN OF RIGHT KNEE: ICD-10-CM

## 2025-05-14 DIAGNOSIS — M15.0 PRIMARY OSTEOARTHRITIS INVOLVING MULTIPLE JOINTS: ICD-10-CM

## 2025-05-14 DIAGNOSIS — M54.16 LUMBAR RADICULOPATHY: ICD-10-CM

## 2025-05-14 DIAGNOSIS — E11.9 TYPE 2 DIABETES MELLITUS WITHOUT COMPLICATION, WITHOUT LONG-TERM CURRENT USE OF INSULIN (HCC): ICD-10-CM

## 2025-05-14 DIAGNOSIS — M54.12 CERVICAL RADICULOPATHY: ICD-10-CM

## 2025-05-14 DIAGNOSIS — E11.65 TYPE 2 DIABETES MELLITUS WITH HYPERGLYCEMIA, WITHOUT LONG-TERM CURRENT USE OF INSULIN (HCC): Primary | ICD-10-CM

## 2025-05-14 DIAGNOSIS — N18.30 CRF (CHRONIC RENAL FAILURE), STAGE 3 (MODERATE) (HCC): ICD-10-CM

## 2025-05-14 DIAGNOSIS — I49.5 TACHY-BRADY SYNDROME (HCC): ICD-10-CM

## 2025-05-14 LAB — HBA1C MFR BLD: 9.9 %

## 2025-05-14 PROCEDURE — 99215 OFFICE O/P EST HI 40 MIN: CPT | Performed by: FAMILY MEDICINE

## 2025-05-14 PROCEDURE — 1123F ACP DISCUSS/DSCN MKR DOCD: CPT | Performed by: FAMILY MEDICINE

## 2025-05-14 PROCEDURE — 3046F HEMOGLOBIN A1C LEVEL >9.0%: CPT | Performed by: FAMILY MEDICINE

## 2025-05-14 PROCEDURE — 83036 HEMOGLOBIN GLYCOSYLATED A1C: CPT | Performed by: FAMILY MEDICINE

## 2025-05-14 PROCEDURE — G8427 DOCREV CUR MEDS BY ELIG CLIN: HCPCS | Performed by: FAMILY MEDICINE

## 2025-05-14 PROCEDURE — G8417 CALC BMI ABV UP PARAM F/U: HCPCS | Performed by: FAMILY MEDICINE

## 2025-05-14 PROCEDURE — 1159F MED LIST DOCD IN RCRD: CPT | Performed by: FAMILY MEDICINE

## 2025-05-14 PROCEDURE — 1036F TOBACCO NON-USER: CPT | Performed by: FAMILY MEDICINE

## 2025-05-14 PROCEDURE — 3078F DIAST BP <80 MM HG: CPT | Performed by: FAMILY MEDICINE

## 2025-05-14 PROCEDURE — 3074F SYST BP LT 130 MM HG: CPT | Performed by: FAMILY MEDICINE

## 2025-05-14 PROCEDURE — 1160F RVW MEDS BY RX/DR IN RCRD: CPT | Performed by: FAMILY MEDICINE

## 2025-05-14 RX ORDER — SEMAGLUTIDE 2.68 MG/ML
2 INJECTION, SOLUTION SUBCUTANEOUS WEEKLY
Qty: 12 ML | Refills: 1 | Status: SHIPPED | OUTPATIENT
Start: 2025-05-14

## 2025-05-14 RX ORDER — GLIPIZIDE 5 MG/1
5 TABLET ORAL 2 TIMES DAILY
Qty: 180 TABLET | Refills: 1 | Status: SHIPPED | OUTPATIENT
Start: 2025-05-14

## 2025-05-14 RX ORDER — ALLOPURINOL 100 MG/1
100 TABLET ORAL DAILY
Qty: 90 TABLET | Refills: 1 | Status: SHIPPED | OUTPATIENT
Start: 2025-05-14

## 2025-05-14 RX ORDER — SEMAGLUTIDE 1.34 MG/ML
0.25 INJECTION, SOLUTION SUBCUTANEOUS WEEKLY
Qty: 1.5 ADJUSTABLE DOSE PRE-FILLED PEN SYRINGE | Refills: 1 | COMMUNITY
Start: 2025-05-14 | End: 2026-05-14

## 2025-05-14 RX ORDER — CYCLOBENZAPRINE HCL 10 MG
10 TABLET ORAL NIGHTLY
Qty: 30 TABLET | Refills: 0 | Status: SHIPPED | OUTPATIENT
Start: 2025-05-14 | End: 2025-06-13

## 2025-05-14 SDOH — ECONOMIC STABILITY: FOOD INSECURITY: WITHIN THE PAST 12 MONTHS, YOU WORRIED THAT YOUR FOOD WOULD RUN OUT BEFORE YOU GOT MONEY TO BUY MORE.: NEVER TRUE

## 2025-05-14 SDOH — ECONOMIC STABILITY: FOOD INSECURITY: WITHIN THE PAST 12 MONTHS, THE FOOD YOU BOUGHT JUST DIDN'T LAST AND YOU DIDN'T HAVE MONEY TO GET MORE.: NEVER TRUE

## 2025-05-14 ASSESSMENT — PATIENT HEALTH QUESTIONNAIRE - PHQ9
SUM OF ALL RESPONSES TO PHQ QUESTIONS 1-9: 5
1. LITTLE INTEREST OR PLEASURE IN DOING THINGS: NOT AT ALL
5. POOR APPETITE OR OVEREATING: NOT AT ALL
7. TROUBLE CONCENTRATING ON THINGS, SUCH AS READING THE NEWSPAPER OR WATCHING TELEVISION: NOT AT ALL
10. IF YOU CHECKED OFF ANY PROBLEMS, HOW DIFFICULT HAVE THESE PROBLEMS MADE IT FOR YOU TO DO YOUR WORK, TAKE CARE OF THINGS AT HOME, OR GET ALONG WITH OTHER PEOPLE: NOT DIFFICULT AT ALL
3. TROUBLE FALLING OR STAYING ASLEEP: NOT AT ALL
6. FEELING BAD ABOUT YOURSELF - OR THAT YOU ARE A FAILURE OR HAVE LET YOURSELF OR YOUR FAMILY DOWN: NEARLY EVERY DAY
SUM OF ALL RESPONSES TO PHQ QUESTIONS 1-9: 5
8. MOVING OR SPEAKING SO SLOWLY THAT OTHER PEOPLE COULD HAVE NOTICED. OR THE OPPOSITE, BEING SO FIGETY OR RESTLESS THAT YOU HAVE BEEN MOVING AROUND A LOT MORE THAN USUAL: NOT AT ALL
4. FEELING TIRED OR HAVING LITTLE ENERGY: MORE THAN HALF THE DAYS
9. THOUGHTS THAT YOU WOULD BE BETTER OFF DEAD, OR OF HURTING YOURSELF: NOT AT ALL
2. FEELING DOWN, DEPRESSED OR HOPELESS: NOT AT ALL
SUM OF ALL RESPONSES TO PHQ QUESTIONS 1-9: 5
SUM OF ALL RESPONSES TO PHQ QUESTIONS 1-9: 5

## 2025-05-14 NOTE — TELEPHONE ENCOUNTER
Greg Flores is calling to request a refill on the following medication(s):    Last Visit Date (If Applicable):  10/22/2024    Next Visit Date:    5/14/2025    Medication Request:  Requested Prescriptions     Pending Prescriptions Disp Refills    allopurinol (ZYLOPRIM) 100 MG tablet [Pharmacy Med Name: ALLOPURINOL 100 MG TABLET] 90 tablet 1     Sig: TAKE 1 TABLET BY MOUTH DAILY

## 2025-05-14 NOTE — PROGRESS NOTES
[facility]  [facility] MEDICINE  [address]  [address]  [facility] [address]  Dept: [phone]      Greg Flores is a 85 y.o. male who presents today for follow up on his  medical conditions as noted below.      Chief Complaint   Patient presents with    Back Pain    Knee Pain     Right        Patient Active Problem List:     Lymphoma in remission ([facility])     Hypertension     Anxiety     Type 2 diabetes mellitus with hyperglycemia, without long-term current use of insulin ([facility])     Gout     CAD (coronary artery disease)     GERD (gastroesophageal reflux disease)     Hoarseness of voice     Aphthous stomatitis     Acute pharyngitis     Hyperlipidemia, mixed     Contusion of second toe, left     Urinary frequency     HTN (hypertension)     Hyperlipemia, mixed     Headache     Headache     Stable angina     CRF (chronic renal failure), stage 3 (moderate) ([facility])     Urinary retention     Syncope     Chest pain     Hypotension     Nonspecific ST-T wave electrocardiographic changes     Heart attack ([facility])     CAD, multiple vessel     Multiple vessel coronary artery disease     Arteriosclerosis of arterial coronary artery bypass graft     Sinus pause     MELO (acute kidney injury)     Hyponatremia     Bradycardia     Tachy-kelly syndrome ([facility])     Generalized weakness     S/P CABG (coronary artery bypass graft)     S/P placement of cardiac pacemaker     Localized osteoarthritis of left knee     Depression     Non-Hodgkin's lymphoma, unspecified body region, unspecified non-Hodgkin lymphoma type ([facility])     Past Medical History:   Diagnosis Date    Anxiety     with the chemotherapy     CAD (coronary artery disease)     Dr Mccauley, has three stents.    Enlarged prostate     does not bother him    GERD (gastroesophageal reflux disease)     at times    Gout     not often a problem    Hyperlipidemia age 67    Hypertension age 67    managed by Dr Renteria    Lymphoma in remission ([facility])

## 2025-05-17 ENCOUNTER — HOSPITAL ENCOUNTER (OUTPATIENT)
Dept: GENERAL RADIOLOGY | Age: 85
Discharge: HOME | End: 2025-05-19
Payer: COMMERCIAL

## 2025-05-17 DIAGNOSIS — G89.29 CHRONIC PAIN OF RIGHT KNEE: ICD-10-CM

## 2025-05-17 DIAGNOSIS — M54.12 CERVICAL RADICULOPATHY: ICD-10-CM

## 2025-05-17 DIAGNOSIS — M25.561 CHRONIC PAIN OF RIGHT KNEE: ICD-10-CM

## 2025-05-17 DIAGNOSIS — M54.16 LUMBAR RADICULOPATHY: ICD-10-CM

## 2025-05-17 PROCEDURE — 72040 X-RAY EXAM NECK SPINE 2-3 VW: CPT

## 2025-05-17 PROCEDURE — 73562 X-RAY EXAM OF KNEE 3: CPT

## 2025-05-17 PROCEDURE — 72100 X-RAY EXAM L-S SPINE 2/3 VWS: CPT

## 2025-05-23 ENCOUNTER — TELEPHONE (OUTPATIENT)
Dept: FAMILY MEDICINE CLINIC | Age: 85
End: 2025-05-23

## 2025-05-23 ENCOUNTER — HOSPITAL ENCOUNTER (OUTPATIENT)
Dept: GENERAL RADIOLOGY | Age: 85
Discharge: HOME | End: 2025-05-25
Payer: COMMERCIAL

## 2025-05-23 DIAGNOSIS — I25.10 CORONARY ARTERY DISEASE INVOLVING NATIVE HEART, UNSPECIFIED VESSEL OR LESION TYPE, UNSPECIFIED WHETHER ANGINA PRESENT: ICD-10-CM

## 2025-05-23 DIAGNOSIS — I25.10 CORONARY ARTERY DISEASE INVOLVING NATIVE HEART, UNSPECIFIED VESSEL OR LESION TYPE, UNSPECIFIED WHETHER ANGINA PRESENT: Primary | ICD-10-CM

## 2025-05-23 PROCEDURE — 71046 X-RAY EXAM CHEST 2 VIEWS: CPT

## 2025-05-23 NOTE — TELEPHONE ENCOUNTER
Ariella from central scheduling called stating patient is scheduled to have MRI but has had open heart surgery in the past. They would like to do a two view chest X-ray before patient has MRI. Order pended. Please advise.

## 2025-05-27 ENCOUNTER — RESULTS FOLLOW-UP (OUTPATIENT)
Dept: FAMILY MEDICINE CLINIC | Age: 85
End: 2025-05-27

## 2025-06-06 ENCOUNTER — TELEPHONE (OUTPATIENT)
Dept: FAMILY MEDICINE CLINIC | Age: 85
End: 2025-06-06

## 2025-06-06 NOTE — TELEPHONE ENCOUNTER
----- Message from Lucila BRADY sent at 6/4/2025  4:02 PM EDT -----  Regarding: ECC Message to Provider  ECC Message to Provider    Relationship to Patient: Covered Entity - [facility]-from prior autorization at [facility]     Additional Information / [facility]-from prior autorization at [facility] inform that the  order for MRI was denied and need the office to call the insurance provider of the patient to give medical information of the patient         --------------------------------------------------------------------------------------------------------------------------    Call Back Information: OK to leave message on voicemail  Preferred Call Back Number: Phone  [phone]

## 2025-06-09 ENCOUNTER — TELEPHONE (OUTPATIENT)
Dept: FAMILY MEDICINE CLINIC | Age: 85
End: 2025-06-09

## 2025-06-09 DIAGNOSIS — R93.7 ABNORMAL X-RAY OF LUMBAR SPINE: ICD-10-CM

## 2025-06-09 DIAGNOSIS — M54.16 LUMBAR RADICULOPATHY: Primary | ICD-10-CM

## 2025-06-09 NOTE — TELEPHONE ENCOUNTER
Date patient is due:01/01/25    Outcome of phone call? appointment scheduled    Did patient deny appointment? no    Has a packet mailed or is patient completing on mychart? mychart    Virtual visit offered? no    Nutrition Care Plan    Nutrition Diagnosis:   Severe Protein Calorie Malnutrition related to poor appetite for past 1.5 weeks with increasing LE pain as evidenced by energy intake of < 50% of estimated energy expenditure for > 5 days; moderate body fat loss noted in orbitals and triceps; moderate muscle loss in temples, clavicle protrusion, mild-mod. deltoid wasting and mild interosseous muscle loss; 3-4+ pitting edema to LLE.    Intervention:  Received wound consult.  Current Diet: Npo Diet With Exceptions; Medications  Specific foods/beverages or groups: Pt verbalizing desire for fluids stating he is very thirsty.  Explained that he is receiving IVF for hydration however pt preoccupied trying to drink liquids at this time.  Notified RN.  Briefly discussed importance of nutrition for healing and improving nutritional status.  Commercial beverage: When able to start po intake and if consistent with diet order, recommend standard oral supplement BID between meals to help wound healing and nutrition repletion.       Monitoring and Evaluation:  Type of food/meals: Goal is to start diet in 1-2 days.

## 2025-06-23 ENCOUNTER — OFFICE VISIT (OUTPATIENT)
Dept: FAMILY MEDICINE CLINIC | Age: 85
End: 2025-06-23
Payer: MEDICARE

## 2025-06-23 VITALS
BODY MASS INDEX: 26.36 KG/M2 | HEIGHT: 69 IN | WEIGHT: 178 LBS | DIASTOLIC BLOOD PRESSURE: 67 MMHG | HEART RATE: 75 BPM | OXYGEN SATURATION: 95 % | SYSTOLIC BLOOD PRESSURE: 107 MMHG

## 2025-06-23 DIAGNOSIS — D64.9 ANEMIA, UNSPECIFIED TYPE: ICD-10-CM

## 2025-06-23 DIAGNOSIS — I10 PRIMARY HYPERTENSION: ICD-10-CM

## 2025-06-23 DIAGNOSIS — Z00.00 MEDICARE ANNUAL WELLNESS VISIT, SUBSEQUENT: Primary | ICD-10-CM

## 2025-06-23 DIAGNOSIS — N18.30 CRF (CHRONIC RENAL FAILURE), STAGE 3 (MODERATE) (HCC): ICD-10-CM

## 2025-06-23 DIAGNOSIS — E78.2 HYPERLIPEMIA, MIXED: ICD-10-CM

## 2025-06-23 DIAGNOSIS — E11.9 TYPE 2 DIABETES MELLITUS WITHOUT COMPLICATION, WITHOUT LONG-TERM CURRENT USE OF INSULIN (HCC): ICD-10-CM

## 2025-06-23 PROBLEM — E11.22 TYPE 2 DIABETES MELLITUS WITH CHRONIC KIDNEY DISEASE (HCC): Status: ACTIVE | Noted: 2025-06-23

## 2025-06-23 PROCEDURE — 3078F DIAST BP <80 MM HG: CPT | Performed by: FAMILY MEDICINE

## 2025-06-23 PROCEDURE — 3074F SYST BP LT 130 MM HG: CPT | Performed by: FAMILY MEDICINE

## 2025-06-23 PROCEDURE — 1159F MED LIST DOCD IN RCRD: CPT | Performed by: FAMILY MEDICINE

## 2025-06-23 PROCEDURE — 3046F HEMOGLOBIN A1C LEVEL >9.0%: CPT | Performed by: FAMILY MEDICINE

## 2025-06-23 PROCEDURE — 1123F ACP DISCUSS/DSCN MKR DOCD: CPT | Performed by: FAMILY MEDICINE

## 2025-06-23 PROCEDURE — 1160F RVW MEDS BY RX/DR IN RCRD: CPT | Performed by: FAMILY MEDICINE

## 2025-06-23 PROCEDURE — G0439 PPPS, SUBSEQ VISIT: HCPCS | Performed by: FAMILY MEDICINE

## 2025-06-23 ASSESSMENT — PATIENT HEALTH QUESTIONNAIRE - PHQ9
SUM OF ALL RESPONSES TO PHQ QUESTIONS 1-9: 0
8. MOVING OR SPEAKING SO SLOWLY THAT OTHER PEOPLE COULD HAVE NOTICED. OR THE OPPOSITE, BEING SO FIGETY OR RESTLESS THAT YOU HAVE BEEN MOVING AROUND A LOT MORE THAN USUAL: NOT AT ALL
9. THOUGHTS THAT YOU WOULD BE BETTER OFF DEAD, OR OF HURTING YOURSELF: NOT AT ALL
4. FEELING TIRED OR HAVING LITTLE ENERGY: NOT AT ALL
10. IF YOU CHECKED OFF ANY PROBLEMS, HOW DIFFICULT HAVE THESE PROBLEMS MADE IT FOR YOU TO DO YOUR WORK, TAKE CARE OF THINGS AT HOME, OR GET ALONG WITH OTHER PEOPLE: NOT DIFFICULT AT ALL
1. LITTLE INTEREST OR PLEASURE IN DOING THINGS: NOT AT ALL
3. TROUBLE FALLING OR STAYING ASLEEP: NOT AT ALL
2. FEELING DOWN, DEPRESSED OR HOPELESS: NOT AT ALL
5. POOR APPETITE OR OVEREATING: NOT AT ALL
6. FEELING BAD ABOUT YOURSELF - OR THAT YOU ARE A FAILURE OR HAVE LET YOURSELF OR YOUR FAMILY DOWN: NOT AT ALL
7. TROUBLE CONCENTRATING ON THINGS, SUCH AS READING THE NEWSPAPER OR WATCHING TELEVISION: NOT AT ALL
SUM OF ALL RESPONSES TO PHQ QUESTIONS 1-9: 0

## 2025-06-23 NOTE — PATIENT INSTRUCTIONS

## 2025-06-23 NOTE — PROGRESS NOTES
Medicare Annual Wellness Visit    Greg Flores is here for Medicare AWV    Assessment & Plan   Medicare annual wellness visit, subsequent  Primary hypertension  Type 2 diabetes mellitus without complication, without long-term current use of insulin (HCC)  CRF (chronic renal failure), stage 3 (moderate) (HCC)  Anemia, unspecified type  Hyperlipemia, mixed       No follow-ups on file.     Subjective   The following acute and/or chronic problems were also addressed today:  History of Present Illness  The patient is an 85-year-old male who presents for a Medicare wellness exam.    He monitors his blood glucose levels weekly, typically fasting, and reports readings around 110. He attributes this improvement to his medication regimen, which includes metformin, glipizide, and an increased dose of Ozempic. His diet is rich in fruits, particularly blueberries.    He has been experiencing a cough for the past 2 days but does not report feeling unwell. He has been managing the cough with Mucinex.    He also reports issues with arthritis and back pain. He is scheduled to start physical therapy for his back on Thursday.      Patient's complete Health Risk Assessment and screening values have been reviewed and are found in Flowsheets. The following problems were reviewed today and where indicated follow up appointments were made and/or referrals ordered.    Positive Risk Factor Screenings with Interventions:                 Dentist Screen:  Have you seen the dentist within the past year?: (!) No    Intervention:  Patient declines any further evaluation or treatment     Vision Screen:  Do you have difficulty driving, watching TV, or doing any of your daily activities because of your eyesight?: No  Have you had an eye exam within the past year?: (!) No  Interventions:   Patient encouraged to make appointment with their eye specialist      Advanced Directives:  Do you have a Living Will?: (!) No    Intervention:  has NO advanced

## 2025-06-26 ENCOUNTER — HOSPITAL ENCOUNTER (OUTPATIENT)
Dept: PHYSICAL THERAPY | Facility: CLINIC | Age: 85
Setting detail: THERAPIES SERIES
Discharge: HOME | End: 2025-06-26
Attending: FAMILY MEDICINE
Payer: MEDICARE

## 2025-06-26 PROCEDURE — 97161 PT EVAL LOW COMPLEX 20 MIN: CPT

## 2025-06-26 PROCEDURE — 97110 THERAPEUTIC EXERCISES: CPT

## 2025-06-26 NOTE — CONSULTS
[x] [facility]  Outpatient Rehabilitation &  Therapy  [address]  P: [phone]  F: [phone]     Physical Therapy Spine Evaluation    Date:  2025  Patient: Greg Flores  : 1940  MRN: [id number]  Physician: Belle Renteria DO   Insurance: , [facility] Medicare, [employer] Medicare Dual, BMN  Medical Diagnosis: Associated Dx: Lumbar radiculopathy (M54.16)  Abnormal x-ray of lumbar spine (R93.7)    Rehab Codes: M54.51, M62.830  Onset Date: 2025 date of script, per pt ongoing    Next 's appt.: 2025    Subjective:   CC: Client reporting pain if is bent forward and tries to stand up he has difficulty coming to upright  \"I hate gravity\"  Right side \"pulls me down, get stuck\"  No longer having pain in legs since surgery.  States he has to sit after walking 1/2 a mile  Typical exercises daily, treadmill (1/2 mile or 1/2 hour) was running 5-6 miles daily before the operation  , pull and push machine for cardio  [employer] for running, walking.     HPI: 2020 in the form of an L2-4 laminectomy.   Reports he had an operation in his back due to his legs \"being numb\" \"hernia in my back/spine\"  States something went wrong, they did it again the same week  \"It wasn't good\"      Symptoms:    Pain present? yes   Location Right low back   Pain Rating currently 0 /10- if sitting   Pain at worst  10/10, ,gardening, sit to stand position changes   Pain at best 0/10   Description of pain Sharp, \"pulling me down\",    Altered Sensation None    What makes it worse [] Ice      [] heat [] Medication    []Position  []    [] AM    [] PM    [] Sit    [x] Rise/Sit    []Stand    [] Walk    [] Lying   What makes it better [] Ice      [] heat [] Medication    []Position  []    [] AM    [] PM    [] Sit    [] Rise/Sit    []Stand     [] Walk    [] Lying   Symptom progression [] Improving [x] Worsening [] Same   Sleep [x] OK    [] Disturbed   Bowel/Bladder changes  Prostate issues per pt,

## 2025-06-26 NOTE — CONSULTS
[facility] Fall Risk Assessment    Patient Name:  Greg Flores  : 1940    Risk Factor Scale  Score   History of Falls [] Yes  [x] No 25  0 0   Secondary Diagnosis [] Yes  [x] No 15  0 0   Ambulatory Aid [] Furniture  [] Crutches/cane/walker  [x] None/bedrest/wheelchair/nurse 30  15  0 0   IV/Heparin Lock [] Yes  [x] No 20  0 0   Gait/Transferring [] Impaired  [] Weak  [x] Normal/bedrest/immobile 20  10  0 0   Mental Status [] Forgets limitations  [x] Oriented to own ability 15  0 0      Total:00     Based on the Assessment score: check the appropriate box.    [x]  No intervention needed   Low =   Score of 0-24    []  Use standard prevention interventions Moderate =  Score of 24-44   [] Give patient handout and discuss fall prevention strategies   [] Establish goal of education for patient/family RE: fall prevention strategies    []  Use high risk prevention interventions High = Score of 45 and higher   [] Give patient handout and discuss fall prevention strategies   [] Establish goal of education for patient/family Re: fall prevention strategies   [] Discuss lifeline / other resources    Electronically signed by:   ZAIN D ELA CRUZ, PT  Date: 2025

## 2025-07-02 ENCOUNTER — HOSPITAL ENCOUNTER (OUTPATIENT)
Dept: PHYSICAL THERAPY | Facility: CLINIC | Age: 85
Setting detail: THERAPIES SERIES
Discharge: HOME | End: 2025-07-02
Attending: FAMILY MEDICINE
Payer: MEDICARE

## 2025-07-02 PROCEDURE — 97110 THERAPEUTIC EXERCISES: CPT

## 2025-07-02 NOTE — FLOWSHEET NOTE
[] [facility]  Outpatient Rehabilitation &  Therapy  [address]  [phone]  [phone] [x] [facility]  Outpatient Rehabilitation &  Therapy  [address]  P: [phone]  F: [phone] [] [facility]  Outpatient Rehabilitation &  Therapy  [address]  P: [phone]  F: [phone]  [] [facility] Outpatient Rehabilitation &  Therapy  [address]  P: [phone]  F: [phone]     Physical Therapy Daily Treatment Note    Date:  2025  Patient Name:  Greg Flores    :  1940  MRN: [id number]  Physician: Belle Renteria DO                                   Insurance: , [facility] Medicare, [employer] Medicare Dual, BMN  Medical Diagnosis: Associated Dx: Lumbar radiculopathy (M54.16)  Abnormal x-ray of lumbar spine (R93.7)                      Rehab Codes: M54.51, M62.830  Onset Date: 2025 date of script, per pt ongoing                      Next 's appt.: 2025  Visit# / total visits: ; Progress note for Medicare patient due at visit 6     Cancels/No Shows: 0 / 0    Subjective:    Pain:  [x] Yes  [] No Location: low back, worse on right lumbar side Pain Rating: (0-10 scale) \"no change'/10  Pain altered Tx:  [x] No  [] Yes  Action:  Comments:Less pain today but didn't work or do anything this morning.  \"If I bend forward, walk, then it hurts\"    Objective:    Modalities: massage to right lumbar paraspinals, 6 minutes, sidelying on left side  Precautions [] No  [x] Yes:2020 in the form of an L2-4 laminectomy, CAD< hx stent  Exercises:  Exercise Reps/ Time Weight/ Level Comments   sitting         Trunk rotation arms crossed 10x       Side bending 10x each       retraction   blue      forward flexion on large ball  10x  20 sec  7/2 added, pt reported felt a \"stretch\"   Lateral side stretch with large physioball 10x eac  7/2 added                T band rotations

## 2025-07-07 RX ORDER — ALFUZOSIN HYDROCHLORIDE 10 MG/1
10 TABLET, EXTENDED RELEASE ORAL DAILY
Qty: 30 TABLET | Refills: 3 | Status: SHIPPED | OUTPATIENT
Start: 2025-07-07

## 2025-07-07 NOTE — TELEPHONE ENCOUNTER
Gerg Flores is calling to request a refill on the following medication(s):    Last Visit Date (If Applicable):  6/23/2025    Next Visit Date:    6/29/2026    Medication Request:  Requested Prescriptions     Pending Prescriptions Disp Refills    alfuzosin (UROXATRAL) 10 MG extended release tablet [Pharmacy Med Name: ALFUZOSIN HCL ER 10 MG TABLET] 30 tablet 3     Sig: TAKE 1 TABLET BY MOUTH DAILY

## 2025-07-24 ENCOUNTER — HOSPITAL ENCOUNTER (OUTPATIENT)
Dept: PHYSICAL THERAPY | Facility: CLINIC | Age: 85
Setting detail: THERAPIES SERIES
Discharge: HOME | End: 2025-07-24
Attending: FAMILY MEDICINE
Payer: MEDICARE

## 2025-07-24 NOTE — FLOWSHEET NOTE
[] [facility]  Outpatient Rehabilitation &  Therapy  [address]  [phone]  [phone] [x] [facility]  Outpatient Rehabilitation &  Therapy  [address]  P: [phone]  F: [phone] [] [facility]  Outpatient Rehabilitation &  Therapy  [address]  P: [phone]  F: [phone] [] [facility]  Outpatient Rehabilitation &  Therapy  [address]  [phone]  [phone] [] [facility]  Outpatient Rehabilitation &  Therapy  [address]   P: [phone]  F: [phone]  [] [facility]  Outpatient Rehabilitation &  Therapy  [address]  P: [phone]  F: [phone] [] [facility]  Outpatient Rehabilitation &  Therapy  [address]  P: [phone]  F: [phone] [] [facility]  Outpatient Rehabilitation &  Therapy  [address]  P: [phone]  F: [phone] [] [facility]  Outpatient Rehabilitation &  Therapy  [address]  P: [phone]  F: [phone]  [] [facility] Outpatient Rehabilitation &  Therapy  [address]  P: [phone]  F: [phone] [] [facility] Outpatient Rehabilitation &  Therapy  [address]  P: [phone]   F: [phone]      Therapy Cancel/No Show note    Date: 2025  Patient: Greg Flores  : 1940  MRN: [id number]    Cancels/No Shows to date:     For today's appointment patient:    [x]  Cancelled    [] Rescheduled appointment    [] No-show     Reason given by patient:    []  Patient ill    []  Conflicting appointment    [] No transportation      [] Conflict with work    [] No reason given    [] Weather related    [] COVID-19    [x] Other:      Comments:  Feeling better, choosing to be discharged      [] Next

## 2025-07-24 NOTE — DISCHARGE SUMMARY
[] [facility]        Outpatient Physical                Therapy       [address]       Phone: [phone]       Fax: [phone] [] [facility]       [facility]       [address]       Phone: [phone]       Fax: [phone] [] [facility]     [address]     Phone: [phone]     Fax:  [phone]     Physical Therapy Discharge Note  Physician: Belle Renteria DO                                   Insurance: , [facility] Medicare, [employer] Medicare Dual, BMN  Medical Diagnosis: Associated Dx: Lumbar radiculopathy (M54.16)  Abnormal x-ray of lumbar spine (R93.7)                      Rehab Codes: M54.51, M62.830  Onset Date: 2025 date of script, per pt ongoing                      Next 's appt.: 2025  Visit# / total visits: ;  Date: 2025      Patient: Greg Flores  : 1940  MRN: [id number]      Date of initial visit: 2025                Date of final visit: 2025       Discharge Status:     Pt called reporting he was feeling better and didn't feet he needed more therapy.   Pt. is now discharged.            Electronically signed by: ZAIN DE LA CRUZ PT    If you have any questions or concerns, please don't hesitate to call.  Thank you for your referral.

## 2025-07-25 DIAGNOSIS — M54.16 LUMBAR RADICULOPATHY: ICD-10-CM

## 2025-07-25 DIAGNOSIS — M54.12 CERVICAL RADICULOPATHY: ICD-10-CM

## 2025-07-25 RX ORDER — CYCLOBENZAPRINE HCL 10 MG
TABLET ORAL
Qty: 30 TABLET | Refills: 0 | Status: SHIPPED | OUTPATIENT
Start: 2025-07-25

## 2025-07-31 ENCOUNTER — APPOINTMENT (OUTPATIENT)
Dept: PHYSICAL THERAPY | Facility: CLINIC | Age: 85
End: 2025-07-31
Attending: FAMILY MEDICINE
Payer: MEDICARE

## (undated) DEVICE — CATHETER IABP 8FR 50CC FBROPT CONN W INSRT KT TWO STATLOK

## (undated) DEVICE — SUTURE VCRL 2-0 L27IN ABSRB CT BRAID COAT UD J275H

## (undated) DEVICE — BLADE,CARBON-STEEL,15,STRL,DISPOSABLE,TB: Brand: MEDLINE

## (undated) DEVICE — Device

## (undated) DEVICE — SUTURE MCRYL SZ 3-0 L27IN ABSRB UD PS-2 3/8 CIR REV CUT NDL MCP427H

## (undated) DEVICE — SUTURE SZ 7 L18IN NONABSORBABLE SIL CCS L48MM 1/2 CIR STRNM M655G

## (undated) DEVICE — CONTAINER,SPECIMEN,4OZ,OR STRL: Brand: MEDLINE

## (undated) DEVICE — KIT MICRO INTRO 4FR STIFF 40CM NIGHTENALL TUNGSTEN 7SMT

## (undated) DEVICE — INTRODUCER SHTH L13CM OD7FR SH ORNG HUB SEAMLESS SAFSHTH

## (undated) DEVICE — GLOVE ORTHO 7 1/2   MSG9475

## (undated) DEVICE — SUTURE PROL SZ 6-0 L24IN NONABSORBABLE BLU L13MM C-1 3/8 8726H

## (undated) DEVICE — 60-7070-104 TRNQT,DPSB,PLC GREEN: Brand: MEDLINE RENEWAL

## (undated) DEVICE — DRAPE,U/ SHT,SPLIT,PLAS,STERIL: Brand: MEDLINE

## (undated) DEVICE — 450 ML BOTTLE OF 0.05% CHLORHEXIDINE GLUCONATE IN 99.95% STERILE WATER FOR IRRIGATION, USP AND APPLICATOR.: Brand: IRRISEPT ANTIMICROBIAL WOUND LAVAGE

## (undated) DEVICE — FOGARTY - HYDRAGRIP SURGICAL - CLAMP INSERTS: Brand: FOGARTY HYDRAJAW

## (undated) DEVICE — CATHETER ANGIO 6FR L110CM 145DEG VENT POLYUR PGTL 6 SIDE H

## (undated) DEVICE — DRAPE SLUSH DISC W44XL66IN ST FOR RND BSIN HUSH SLUSH SYS

## (undated) DEVICE — Device: Brand: VIRTUOSAPH PLUS WITH RADIAL INDICATION

## (undated) DEVICE — COUNTER NDL 40 COUNT HLD 70 FOAM BLK ADH W/ MAG

## (undated) DEVICE — STRIP,CLOSURE,WOUND,MEDI-STRIP,1/2X4: Brand: MEDLINE

## (undated) DEVICE — 1LYRTR 16FR10ML100%SILTMPS SNP: Brand: MEDLINE INDUSTRIES, INC.

## (undated) DEVICE — DECANTER BAG 9": Brand: MEDLINE INDUSTRIES, INC.

## (undated) DEVICE — CATHETER IV 24GA L0.56IN SM VEIN SHLD SFTY WNG AUTOGRD

## (undated) DEVICE — TOWEL,OR,DSP,ST,BLUE,DLX,XR,4/PK,20PK/CS: Brand: MEDLINE

## (undated) DEVICE — CATHETER ANGIO JL4 0.038 IN AD 6 FRX100 CM STD SUPER TORQUE

## (undated) DEVICE — GLOVE SURG SZ 75 L12IN FNGR THK79MIL GRN LTX FREE

## (undated) DEVICE — MARKER,SKIN,WI/RULER AND LABELS: Brand: MEDLINE

## (undated) DEVICE — SUTURE VCRL + SZ 4-0 L18IN ABSRB UD L19MM PS-2 3/8 CIR PRIM VCP496H

## (undated) DEVICE — APPLICATOR MEDICATED 10.5 CC SOLUTION HI LT ORNG CHLORAPREP

## (undated) DEVICE — INTRODUCER SHTH 6FR L11CM 0.038IN STD SIDEPRT EXTN 3 W

## (undated) DEVICE — BNDG,ELSTC,MATRIX,STRL,4"X5YD,LF,HOOK&LP: Brand: MEDLINE

## (undated) DEVICE — GAUZE,SPONGE,FLUFF,6"X6.75",STRL,5/TRAY: Brand: MEDLINE

## (undated) DEVICE — SUTURE ETHIB EXCL BR GRN TAPR PT 2-0 30 X563H X563H

## (undated) DEVICE — GLOVE SURG SZ 75 CRM LTX FREE POLYISOPRENE POLYMER BEAD ANTI

## (undated) DEVICE — APPLICATOR MEDICATED 26 CC SOLUTION HI LT ORNG CHLORAPREP

## (undated) DEVICE — .: Brand: PERFECTCUT AORTOTOMY SYSTEM

## (undated) DEVICE — SYRINGE MED 30ML STD CLR PLAS LUERLOCK TIP N CTRL DISP

## (undated) DEVICE — SUTURE VCRL SZ 0 L27IN ABSRB UD L36MM CP-1 1/2 CIR REV CUT J267H

## (undated) DEVICE — SUTURE PERMA-HAND SZ 0 L18IN NONABSORBABLE BLK CT-2 L26MM C027D

## (undated) DEVICE — BLOOD TRANSFER PACK 600 CC W/ CPLR

## (undated) DEVICE — PACK PROCEDURE SURG OPN HRT

## (undated) DEVICE — CATHETER,URETHRAL,REDRUBBER,STRL,18FR: Brand: MEDLINE

## (undated) DEVICE — CONTAINER,SPECIMEN,OR STERILE,4OZ: Brand: MEDLINE

## (undated) DEVICE — AGENT HEMOSTATIC SURGIFLOW MATRIX KIT W/THROMBIN

## (undated) DEVICE — PROTECTOR ULN NRV PUR FOAM HK LOOP STRP ANATOMICALLY

## (undated) DEVICE — SPONGE LAP W18XL18IN WHT COT 4 PLY FLD STRUNG RADPQ DISP ST 2 PER PACK

## (undated) DEVICE — DRESSING TRNSPAR W4XL10IN FLM MIC POR SURESITE 123

## (undated) DEVICE — SUTURE ABSRB BRAID COAT UD CP NO 2 27IN VCRL J195H

## (undated) DEVICE — PREMIUM DRY TRAY LF: Brand: MEDLINE INDUSTRIES, INC.

## (undated) DEVICE — GLOVE SURG SZ 8 L12IN FNGR THK79MIL GRN LTX FREE

## (undated) DEVICE — SUMP INTCARD W/ 1/4INCH CONN 20FRENCH 15INCH DLP

## (undated) DEVICE — SUTURE VCRL + SZ 4-0 L27IN ABSRB UD L26MM SH 1/2 CIR VCP415H

## (undated) DEVICE — GLOVE SURG SZ 65 CRM LTX FREE POLYISOPRENE POLYMER BEAD ANTI

## (undated) DEVICE — CATHETER THOR 36FR L23CM DIA12MM POLYVI CHL TAPR CONN TIP

## (undated) DEVICE — MPS® DELIVERY SET W/ARREST AGENT AND ADDITIVE CASSETTES, HEAT EXCHANGER & 10 FT. DELIVERY TUBING: Brand: MPS

## (undated) DEVICE — COVER,TABLE,HEAVY DUTY,50"X90",STRL: Brand: MEDLINE

## (undated) DEVICE — KIT APPL 11:1 PROC W/ FIBRIJET MED CUP APPL TIP TY

## (undated) DEVICE — TIP APPL GEL PLT ENDO 5MMX32CM

## (undated) DEVICE — CEMENT MIXING SYSTEM WITH FEMORAL BREAKWAY NOZZLE: Brand: REVOLUTION

## (undated) DEVICE — INSUFFLATION TUBING SET WITH FILTER, FUNNEL CONNECTOR AND LUER LOCK: Brand: JOSNOE MEDICAL INC

## (undated) DEVICE — GLOVE SURG SZ 7 L12IN FNGR THK79MIL GRN LTX FREE

## (undated) DEVICE — STERNUM BLADE, OFFSET (32.0 X 0.8 X 6.3MM)

## (undated) DEVICE — 1/4 FORCE SURGICAL SPRING CLIP: Brand: STEALTH® SPRING CLIP

## (undated) DEVICE — STRYKER PERFORMANCE SERIES SAGITTAL BLADE: Brand: STRYKER PERFORMANCE SERIES

## (undated) DEVICE — GOWN,SIRUS,NONRNF,SETINSLV,XL,20/CS: Brand: MEDLINE

## (undated) DEVICE — GOWN,AURORA,NONREINFORCED,LARGE: Brand: MEDLINE

## (undated) DEVICE — DRAIN SURG SGL COLL PT TB FOR ATS BG OASIS

## (undated) DEVICE — BLADE OPHTH D5MM 15DEG GRN W/ RND KNURLED HNDL MICRO-SHARP

## (undated) DEVICE — SOLUTION PREP PEROXIDE HYDROGEN 3% 4 OZ 1

## (undated) DEVICE — CANNULA TIP SPRY MAGELLAN

## (undated) DEVICE — BNDG,ELSTC,MATRIX,STRL,6"X5YD,LF,HOOK&LP: Brand: MEDLINE

## (undated) DEVICE — POSITIONER,HEAD,MULTIRING,36CS: Brand: MEDLINE

## (undated) DEVICE — GLOVE SURG SZ 8 L11.77IN FNGR THK9.8MIL STRW LTX POLYMER

## (undated) DEVICE — SUTURE VCRL SZ 4-0 L18IN ABSRB UD L19MM PS-2 3/8 CIR PRIM J496H

## (undated) DEVICE — WIRE PACING BIPOLAR ATRIAL 60CM ULTRA THIN

## (undated) DEVICE — SUTURE NONABSORBABLE MONOFILAMENT 5-0 C-1 1X24 IN PROLENE 8725H

## (undated) DEVICE — WAX SURG 2.5GM HEMSTAT BNE BEESWAX PARAFFIN ISO PALMITATE

## (undated) DEVICE — CONNECTOR TBNG Y 6IN 1 PLAS LTWT

## (undated) DEVICE — SUTURE VCRL SZ 0 L27IN ABSRB VLT L48MM CTX 1/2 CIR TAPR PNT J364H

## (undated) DEVICE — ELECTRODE PT RET AD L9FT HI MOIST COND ADH HYDRGEL CORDED

## (undated) DEVICE — CANNULA PERF L2IN BLNT TIP 2MM VES CLR RADPQ BODY FEM LUER

## (undated) DEVICE — 2T11 #2 PDO 36 X 36: Brand: 2T11 #2 PDO 36 X 36

## (undated) DEVICE — MEDI-TRACE CADENCE ADULT, DEFIBRILLATION ELECTRODE -RTS  (10 PR/PK) - PHYSIO-CONTROL: Brand: MEDI-TRACE CADENCE

## (undated) DEVICE — GLOVE SURG SZ 65 L12IN FNGR THK79MIL GRN LTX FREE

## (undated) DEVICE — STERILE PATIENT PROTECTIVE PAD FOR IMP® KNEE POSITIONERS & COHESIVE WRAP (10 / CASE): Brand: DE MAYO KNEE POSITIONER®

## (undated) DEVICE — SUTURE NONABSORBABLE MONOFILAMENT 4-0 RB-1 36 IN BLU PROLENE 8557H

## (undated) DEVICE — SUTURE PROL SZ 4-0 L36IN NONABSORBABLE BLU L26MM SH 1/2 CIR 8521H

## (undated) DEVICE — KIT INTRO 9FR L13CM DIA0.118IN SPLITTABLE HEMSTAT ROBUST

## (undated) DEVICE — GLOVE SURG SZ 7 CRM LTX FREE POLYISOPRENE POLYMER BEAD ANTI

## (undated) DEVICE — SUTURE STRATAFIX SPRL MCRYL + SZ 2-0 L18IN ABSRB UD CT-1 SXMP1B413

## (undated) DEVICE — SOLUTION IRRIG 3000ML 0.9% SOD CHL USP UROMATIC PLAS CONT

## (undated) DEVICE — CONNECTOR PERF 1/4X1/4 STR

## (undated) DEVICE — TUBE CARDIAC SUCTION 6FR SOFT TIP 10FR

## (undated) DEVICE — CANNULA AORT L55IN OD9FR STD TIP FLOW GRD

## (undated) DEVICE — LIQUIBAND RAPID ADHESIVE 36/CS 0.8ML: Brand: MEDLINE

## (undated) DEVICE — CATHETER DIAG AD 6FR L100CM 0.038IN STD COR POLYUR JUDKINS

## (undated) DEVICE — SUTURE VCRL 3-0 L27IN ABSRB UD PSL L30MM 1/2 CIR TAPERPOINT J502H

## (undated) DEVICE — SUTURE VCRL + SZ 2-0 L27IN ABSRB CLR CT-1 1/2 CIR TAPERCUT VCP259H

## (undated) DEVICE — GOWN,SIRUS,POLYRNF,XLN/3XL,18/CS: Brand: MEDLINE

## (undated) DEVICE — PACK PROCEDURE SURG OPN HRT ADD ON

## (undated) DEVICE — ZIPPERED TOGA, 2X LARGE: Brand: FLYTE, SURGICOOL

## (undated) DEVICE — COVER,LIGHT HANDLE,FLX,2/PK: Brand: MEDLINE INDUSTRIES, INC.

## (undated) DEVICE — 2108 SERIES SAGITTAL BLADE FLARED, GROUND  (29.0 X 1.32 X 84.0MM)

## (undated) DEVICE — 3M™ IOBAN™ 2 ANTIMICROBIAL INCISE DRAPE 6651EZ: Brand: IOBAN™ 2

## (undated) DEVICE — SURGICAL PROCEDURE TRAY CRD CATH SVMMC

## (undated) DEVICE — AGENT HEMSTAT W2XL14IN OXIDIZED REGENERATED CELOS ABSRB FOR

## (undated) DEVICE — PENCIL ES L3M BTTN SWCH HOLSTER W/ BLDE ELECTRD EDGE

## (undated) DEVICE — SUTURE PROL SZ 7-0 L24IN NONABSORBABLE BLU L8MM BV175-6 3/8 8735H

## (undated) DEVICE — GLOVE SURG SZ 85 L12IN FNGR THK79MIL GRN LTX FREE

## (undated) DEVICE — 48" PROBE COVER W/GEL, ULTRASOUND, STERILE: Brand: SITE-RITE

## (undated) DEVICE — CATHETER THOR 36FR L23IN PVC R ANG 5 EYE TAPR CONN TIP SFT

## (undated) DEVICE — 4-PORT MANIFOLD: Brand: NEPTUNE 2

## (undated) DEVICE — EZ GLIDE AORTIC CANNULA: Brand: EDWARDS LIFESCIENCES EZ GLIDE AORTIC CANNULA

## (undated) DEVICE — KIT ANGEL PRP

## (undated) DEVICE — SOLUTION SCRB 10% POVIDONE IOD L8IN WNG SAT SPNG STK

## (undated) DEVICE — AVID DUAL STAGE VENOUS DRAINAGE CANNULA: Brand: AVID DUAL STAGE VENOUS DRAINAGE CANNULA